# Patient Record
Sex: MALE | Race: BLACK OR AFRICAN AMERICAN | URBAN - METROPOLITAN AREA
[De-identification: names, ages, dates, MRNs, and addresses within clinical notes are randomized per-mention and may not be internally consistent; named-entity substitution may affect disease eponyms.]

---

## 2017-01-13 ENCOUNTER — TELEPHONE (OUTPATIENT)
Dept: FAMILY MEDICINE CLINIC | Age: 62
End: 2017-01-13

## 2017-01-13 NOTE — TELEPHONE ENCOUNTER
Received call from [facility] w/ Dr. Blair Waddell office patient has appointment on 1/16/17 and needs new referral DX code M05.79    [facility] 1949  Dr. Thomas Mendez  [address] [place] [address]  Phone # [phone]  Fax # [phone]

## 2017-01-20 ENCOUNTER — OFFICE VISIT (OUTPATIENT)
Dept: FAMILY MEDICINE CLINIC | Age: 62
End: 2017-01-20

## 2017-01-20 VITALS
DIASTOLIC BLOOD PRESSURE: 84 MMHG | WEIGHT: 198.6 LBS | BODY MASS INDEX: 29.41 KG/M2 | HEIGHT: 69 IN | TEMPERATURE: 97 F | OXYGEN SATURATION: 95 % | RESPIRATION RATE: 18 BRPM | SYSTOLIC BLOOD PRESSURE: 128 MMHG | HEART RATE: 69 BPM

## 2017-01-20 DIAGNOSIS — E78.5 HYPERLIPIDEMIA WITH TARGET LDL LESS THAN 100: Chronic | ICD-10-CM

## 2017-01-20 DIAGNOSIS — M06.9 RHEUMATOID ARTHRITIS, INVOLVING UNSPECIFIED SITE, UNSPECIFIED RHEUMATOID FACTOR PRESENCE: Primary | Chronic | ICD-10-CM

## 2017-01-20 DIAGNOSIS — I10 ESSENTIAL HYPERTENSION: Chronic | ICD-10-CM

## 2017-01-20 DIAGNOSIS — Z79.899 ENCOUNTER FOR LONG-TERM (CURRENT) USE OF OTHER MEDICATIONS: ICD-10-CM

## 2017-01-20 DIAGNOSIS — H61.23 BILATERAL IMPACTED CERUMEN: ICD-10-CM

## 2017-01-20 DIAGNOSIS — E11.9 DIABETES MELLITUS TYPE 2, NONINSULIN DEPENDENT (HCC): Chronic | ICD-10-CM

## 2017-01-20 RX ORDER — LOSARTAN POTASSIUM AND HYDROCHLOROTHIAZIDE 12.5; 5 MG/1; MG/1
TABLET ORAL
Qty: 90 TAB | Refills: 1 | Status: SHIPPED | OUTPATIENT
Start: 2017-01-20 | End: 2017-07-25 | Stop reason: SDUPTHER

## 2017-01-20 NOTE — PROGRESS NOTES
3 [facility]  Primary Care Office Visit - Problem-Oriented    : 1955   Sebastian Nathan is a 59 y.o. male presenting for  Chief Complaint   Patient presents with    Cholesterol Problem    Hypertension    Diabetes       Assessment/Plan:     1. Rheumatoid arthritis, involving unspecified site, unspecified rheumatoid factor presence ([facility]  Uncontrolled on only Arava. Had appt with rheum 2 days ago. Encourage patient to continue with rheum consultation. 2. Diabetes mellitus type 2, noninsulin dependent ([address]  Well controlled, most recent A1c 6.7 in Aug 2016. I have reviewed & decided to continue metformin 1g BID. On ASA, ARB. Lipid lowering therapy not prescibed for medical reasons. 3. Essential hypertension  Well controlled. I have reviewed & decided to continue losartan/HCTZ 50/12.5.  - losartan-hydroCHLOROthiazide (HYZAAR) 50-12.5 mg per tablet; TAKE 1 TABLET EVERY DAY  Dispense: 90 Tab; Refill: 1    4. Bilateral impacted cerumen  New issue. Successfully disimpacted bilaterally with manual curette. Encourage olive oil gtt for softening prior to shower ~2-3x/wk. - REMOVE IMPACTED EAR WAX    Spent 25min face-to-face, >50% spent on counseling & patient education. This document may have been created with the aid of dictation software. Text may contain errors, particularly phonetic errors. Reviewed management plan & instructions with patient, who voiced understanding. Chase Arroyo MD  Internal Medicine, Family Medicine & Sports Medicine  2017, 10:10 AM    Patient Instructions (provided in AVS): To Do:  · Try using olive oil drops (1-2 drops) into your ears before showering (softens up the wax)  · Okay to take that elavil (amitryptiline) before bed to help you get a good night sleep  · If Dr. Feli Corcoran wants you to have bloodwork before your next appt with him. ... Get the lab slip, and the appointment date, and bring it to me!   That way, you can get all your bloodwork done here in my office, and I will send Ana Betancourt your results    Earwax Blockage: Care Instructions      History:   Nate Ortega is a 59 y.o. male presenting to address:  Chief Complaint   Patient presents with    Cholesterol Problem    Hypertension    Diabetes       Going to the gym 3-4x/week. No recent illnesses. Complains of some ear wax buildup. Had appt with rheumatology (first one in a while), 2 days ago. Dr. Ana Betancourt mentioned possibly adding plaquenil. Patient prefers to have his bloodwork done @ our office. Notes some difficulty sleeping. \"I sleep about 6, but I'd like to sleep more\"  Admits to not taking elavil regularly. \"Why am I on that again? \"       Past Medical History   Diagnosis Date    Diabetes ([facility]     H. pylori infection      s/p prevpack    H/O esophagogastroduodenoscopy 3/1/2013     irregular z-line, small 1cm submucosal nodule    Hypercholesterolemia     Hypertension     Rheumatoid arthritis(714.0)      Past Surgical History   Procedure Laterality Date    Hx hernia repair      Hx wisdom teeth extraction      Endoscopy, colon, diagnostic        reports that he has quit smoking. He has never used smokeless tobacco. He reports that he does not drink alcohol or use illicit drugs.   Social History     Social History Narrative     History   Smoking Status    Former Smoker   Smokeless Tobacco    Never Used     Family History   Problem Relation Age of Onset    Heart Disease Mother     Heart Disease Father    Lorin Yan Arthritis-rheumatoid Sister     Hypertension Sister     Heart Disease Sister     Heart Disease Brother      Allergies   Allergen Reactions    Statins-Hmg-Coa Reductase Inhibitors Myalgia       Problem List:      Patient Active Problem List    Diagnosis    Essential hypertension     - meds: losartan/HCTZ 50/12.5        Diabetes mellitus type 2, noninsulin dependent ([facility]     - meds: metformin 1000mg BID    - A1c 7.5 (Nov 2015)  - A1c 6.5 (4/14/2015)    - foot exam: 7/8/2014    Intolerant of statins.  Rheumatoid arthritis [facility])     New consult Dr. [facility].    -4/26/2016: [Rheum] refilled leflunomide 20 mg daily, noted that he would be switching to a rheumatologist closer to home  -2/17/2016: continue on leflunomide, plaquenil, labs: cbc, ESR, CMP, CRP, B hand xrays;  follow up 2 mo    Previously followed by Dr. Manfred Gil (rheum)    -- 9/29/2014 [rheum]: cont humira, plaquenil & tramadol; stop prednisone; flu shot given  -- 7/29/2014 [rheum]: cont humira & plaquenil; reduce prednisone to 2.5mg daily  -- 4/29/2014 [rheum]: combo of RA & DJD; continue humira, plaquenil & prednisone 5mg daily; added tramadol qhs prn; avoid NSAIDs; recs: check neck US to rule out lymph node swelling (R side, above clavicle)  -- Mar 2013: RF = 588      Hyperlipidemia with target LDL less than 100     - statin intolerant    -- tot 258 (H), HDL 45, , LDLc 186 (H) (1/10/2015); 10yr ASCVD risk 22.5%  -- tot 146, , HDL 51, LDLc 72 (7/8/2014)  -- Sept 2013: tot 139, LDL 76, HDL 42,       Chronic gastritis    Esophagitis     Chronic esophgitis by bx with irregular z-line (Mar 2013) by Dr. Rosanne Denton (DL[facility]).  DDD (degenerative disc disease), lumbosacral     L-spine XR (Dec 2016): Mild L4-S1 degenerative endplate reactive changes and disc space height loss. Moderate bilateral facet arthropathy L4-S1. Bilateral oblique views demonstrate moderate right and mild left L5-S1 foraminal stenoses.  Statin intolerance    Advance care planning    Obesity    Right thyroid nodule     Followed by Dr. Sachi Weber (endo).     -- 6/10/2014 [endo]: FNA Bx negative for malignancy      Vitamin D deficiency     - VitD 34.4 (7/8/2014)    *5/7/2014: VitD 14.6 (L)  - start 50k 2x/wk      Elevated TSH    Decreased libido     -- 3/4/2014: slightly low free testosterone, normal PSA, but slightly elevated TSH      TERRY (obstructive sleep apnea)     [facility] S[facility])  -- 12/17/2013: overnight sleep study pending        Myalgia     *10/9/2014: improved off of januvia  *5/7/2014: sx unchanged off crestor    -- 3/4/2014: continue to hold crestor; advised massage & stretching  --  (2/21/2014); holding crestor until next appt  -- aldolase wnl (2/21/2014)      Routine health maintenance     -- c-scope: 4/6/2012 by Lauro Harvey ([address]), hyperplastic polyps; next due in 10 yrs (Apr 2022)      Neural foraminal stenosis of cervical spine     C-spine MRI (Apr 2013):  - focal R paracentral disc protrusion @ C3-4 with mild cord impingement  - mild to mod foraminal stenoses, primarily @ C3-4 and C4-5 on right  - no C1-2 rheumatoid instability      Hemoglobin C trait ([address]     Seen on Hgb electrophoresis on 5/29/2013. Medications:     Current Outpatient Prescriptions   Medication Sig    losartan-hydroCHLOROthiazide (HYZAAR) 50-12.5 mg per tablet TAKE 1 TABLET EVERY DAY    ACCU-[employer] TEST STRIP strip CHECK BLOOD SUGAR TWICE DAILY    traMADol (ULTRAM) 50 mg tablet TAKE 1 TABLET EVERY 8 HOURS AS NEEDED FOR PAIN  ( MAXIMUM DAILY AMOUNT:  150MG  )    leflunomide (ARAVA) 20 mg tablet Take 1 Tab by mouth daily.  metFORMIN (GLUCOPHAGE) 1,000 mg tablet TAKE 1 TABLET TWICE DAILY WITH MEALS    ACCU-[employer] SMARTVIEW CONTRL SOL soln USE AS DIRECTED    omeprazole (PRILOSEC) 40 mg capsule TAKE 1 CAPSULE EVERY DAY    amitriptyline (ELAVIL) 25 mg tablet Take 1 Tab by mouth nightly. Take amitriptyline about 1 hour before bedtime (Patient taking differently: Take 25 mg by mouth nightly as needed. Take amitriptyline about 1 hour before bedtime)    acetaminophen (TYLENOL) 500 mg tablet 500 mg.  butalbital-acetaminophen-caffeine (FIORICET, ESGIC) -40 mg per tablet Take 1 Tab by mouth every four (4) hours as needed for Pain. Max Daily Amount: 6 Tabs.     red yeast rice extract 600 mg cap Take 2,400 mg by mouth daily.  fluticasone (FLONASE) 50 mcg/actuation nasal spray 2 Sprays by Both Nostrils route daily.  aspirin 81 mg tablet Take 1 Tab by mouth daily.  Walker (ULTRA-LIGHT ROLLATOR) Misc Rollator Walker. Use as directed. No current facility-administered medications for this visit. Review of Systems:     (positives in bold)   Const: fatigue, weight change, appetite change, fevers, chills   Neuro: headaches, vision changes, dizziness, loss of consciousness, burning pain, tingling, numbness   HEENT: itchy eyes, runny eyes, red eyes, eye discharge, vision changes, light sensitivity, ear pain, ear pressure, ear popping, ear discharge/drainage, hearing change,nosebleeds, sneezing, runny nose, nasal congestion,  change in sense of smell, sore throat, voice change or hoarse voice,   dry mouth, toothache, jaw popping, difficulty swallowing, painful swallowing,   oral ulcers or canker sores   CV: chest pain, palpitations, orthopnea, PND   Pulm: dyspnea, wheezing, cough, sputum production, hemoptysis   GI: nausea, vomiting, heartburn, abdominal pain, greasy stools,   blood in stool, diarrhea, constipation   : dysuria, hematuria, change in urine, urinary frequency, urinary urgency   MSK: muscle/joint pain, joint swelling   Derm: rashes, skin changes   Allergy: seasonal allergies, itchy eyes   Heme: easy bleeding/bruising   Psych: Suicidal ideation, homicidal ideation       Physical Assessment:   VS:    Vitals:    01/20/17 1000 01/20/17 1020   BP: 129/90 128/84   Pulse: 69    Resp: 18    Temp: 97 °F (36.1 °C)    TempSrc: Oral    SpO2: 95%    Weight: 198 lb 9.6 oz (90.1 kg)    Height: 5' 9\" (1.753 m)    PainSc:   5    PainLoc: Back        General:     Well-developed, well-nourished male, in NAD    Head:      No facial asymmetry noted. Ears:    Bilateral EACs almost completely blocked with soft cerumen.   Successfully disimpacted manually with ear currette on both sides. Bilateral TMs wnl. Neck:      Neck supple, no thyromegaly  Cardiovasc:     No JVD. RRR, no MRG. Pulses 2+ and symmetric at distal extremities. Pulmonary:     Lungs clear bilaterally. Normal respiratory effort. Extremities:     No edema, no TTP bilateral calves. No joint effusions. LEs warm and well-perfused. Neuro:     Alert and oriented, CNs II-XII intact, no focal deficits. Skin:      No rashes noted. Toenails without signs of onychomycosis. Psych:    pleasant, and conversant. Affect, mood & judgment appropriate. Lab Results   Component Value Date/Time    Sodium 139 12/09/2016 11:43 AM    Potassium 4.0 12/09/2016 11:43 AM    Chloride 95 12/09/2016 11:43 AM    CO2 28 12/09/2016 11:43 AM    Anion gap 8 11/23/2015 11:32 AM    Glucose 100 12/09/2016 11:43 AM    BUN 9 12/09/2016 11:43 AM    Creatinine 0.78 12/09/2016 11:43 AM    BUN/Creatinine ratio 12 12/09/2016 11:43 AM    GFR est  12/09/2016 11:43 AM    GFR est non-AA 96 12/09/2016 11:43 AM    Calcium 9.7 12/09/2016 11:43 AM    Bilirubin, total 0.6 12/09/2016 11:43 AM    ALT 19 12/09/2016 11:43 AM    AST 16 12/09/2016 11:43 AM    Alk.  phosphatase 98 12/09/2016 11:43 AM    Protein, total 7.3 12/09/2016 11:43 AM    Albumin 4.4 12/09/2016 11:43 AM    Globulin 3.7 11/23/2015 11:32 AM    A-G Ratio 1.5 12/09/2016 11:43 AM     Lab Results   Component Value Date/Time    Cholesterol, total 257 12/09/2016 11:43 AM    HDL Cholesterol 45 12/09/2016 11:43 AM    LDL, calculated 188 12/09/2016 11:43 AM    VLDL, calculated 24 12/09/2016 11:43 AM    Triglyceride 122 12/09/2016 11:43 AM    CHOL/HDL Ratio 7.0 11/23/2015 11:32 AM     Lab Results   Component Value Date/Time    TSH 2.610 12/09/2016 11:43 AM    TSH 2.54 01/09/2015 10:36 AM        12/13/2016 11:53 AM - Ricardo, [facility] Lab Results In       Component Results      Component Value Flag Ref Range Units Status     C-Reactive Protein, Qt 20.1 (H) 0.0 - 4.9 mg/L Final     Lab Results Component Value Date/Time    Prostate Specific Ag 0.9 12/09/2016 11:43 AM    Prostate Specific Ag 0.8 03/05/2014 09:20 AM       Lab Results   Component Value Date/Time    Hemoglobin A1c 6.7 08/26/2016 10:05 AM    Hemoglobin A1c (POC) 6.7 05/23/2016 10:31 AM

## 2017-01-20 NOTE — MR AVS SNAPSHOT
Visit Information Date & Time Provider Department Dept. Phone Encounter #  
 1/20/2017 10:00 AM Daryle Harm, 3 [facility] [phone] [id number] Upcoming Health Maintenance Date Due ZOSTER VACCINE AGE 60> 5/5/2013 EYE EXAM RETINAL OR DILATED Q1 4/17/2016 HEMOGLOBIN A1C Q6M 2/26/2017 FOOT EXAM Q1 9/8/2017 MICROALBUMIN Q1 12/9/2017 LIPID PANEL Q1 12/9/2017 COLONOSCOPY 4/6/2022 DTaP/Tdap/Td series (3 - Td) 4/20/2025 Allergies as of 1/20/2017  Review Complete On: 1/20/2017 By: Daryle Harm, MD  
  
 Severity Noted Reaction Type Reactions Statins-hmg-coa Reductase Inhibitors Medium 04/20/2015    Myalgia Current Immunizations  Reviewed on 4/20/2015 Name Date Influenza Vaccine 9/8/2016 10:01 AM, 11/23/2015 10:01 AM  
 Pneumococcal Polysaccharide (PPSV-23) 8/11/2009 Tdap 4/20/2015 10:12 AM, 2/19/2009 Not reviewed this visit You Were Diagnosed With   
  
 Codes Comments Rheumatoid arthritis, involving unspecified site, unspecified rheumatoid factor presence ([facility]    -  Primary ICD-10-CM: M06.9 ICD-9-CM: 714.0 Diabetes mellitus type 2, noninsulin dependent ([facility]     ICD-10-CM: E11.9 ICD-9-CM: 250.00 Essential hypertension     ICD-10-CM: I10 
ICD-9-CM: 401.9 Bilateral impacted cerumen     ICD-10-CM: H61.23 
ICD-9-CM: 380.4 Vitals BP Pulse Temp Resp Height(growth percentile) Weight(growth percentile) 128/84 (BP 1 Location: Right arm, BP Patient Position: Sitting) 69 97 °F (36.1 °C) (Oral) 18 5' 9\" (1.753 m) 198 lb 9.6 oz (90.1 kg) SpO2 BMI Smoking Status 95% 29.33 kg/m2 Former Smoker Vitals History BMI and BSA Data Body Mass Index Body Surface Area  
 29.33 kg/m 2 2.09 m 2 Preferred Pharmacy Pharmacy Name Phone [address] [facility] [address] [place] [address] [phone] Your Updated Medication List  
  
   
 This list is accurate as of: 1/20/17 10:40 AM.  Always use your most recent med list.  
  
  
  
  
 ACCU-CHEK SMARTVIEW CONTRL SOL Soln Generic drug:  Blood Glucose Control, Normal  
USE AS DIRECTED ACCU-CHEK SMARTVIEW TEST STRIP strip Generic drug:  glucose blood VI test strips CHECK BLOOD SUGAR TWICE DAILY  
  
 acetaminophen 500 mg tablet Commonly known as:  TYLENOL  
500 mg.  
  
 amitriptyline 25 mg tablet Commonly known as:  ELAVIL Take 1 Tab by mouth nightly. Take amitriptyline about 1 hour before bedtime  
  
 aspirin 81 mg tablet Take 1 Tab by mouth daily. butalbital-acetaminophen-caffeine -40 mg per tablet Commonly known as:  Clara Reasoner Take 1 Tab by mouth every four (4) hours as needed for Pain. Max Daily Amount: 6 Tabs. fluticasone 50 mcg/actuation nasal spray Commonly known as:  Mckinley Means 2 Sprays by Both Nostrils route daily. leflunomide 20 mg tablet Commonly known as:  Joao Toribio Take 1 Tab by mouth daily. losartan-hydroCHLOROthiazide 50-12.5 mg per tablet Commonly known as:  HYZAAR  
TAKE 1 TABLET EVERY DAY  
  
 metFORMIN 1,000 mg tablet Commonly known as:  GLUCOPHAGE  
TAKE 1 TABLET TWICE DAILY WITH MEALS  
  
 omeprazole 40 mg capsule Commonly known as:  PRILOSEC  
TAKE 1 CAPSULE EVERY DAY  
  
 red yeast rice extract 600 mg Cap Take 2,400 mg by mouth daily. traMADol 50 mg tablet Commonly known as:  ULTRAM  
TAKE 1 TABLET EVERY 8 HOURS AS NEEDED FOR PAIN  ( MAXIMUM DAILY AMOUNT:  150MG  ) Kavita Frederick Commonly known as:  ULTRA-LIGHT ROLLATOR Rollator Walker. Use as directed. Prescriptions Sent to Pharmacy Refills  
 losartan-hydroCHLOROthiazide (HYZAAR) 50-12.5 mg per tablet 1 Sig: TAKE 1 TABLET EVERY DAY Class: Normal  
 Pharmacy: [address] [place] [address], [address] [place] [address] Ph #: [phone] We Performed the Following REMOVE IMPACTED EAR WAX [33295 CPT(R)] Patient Instructions To Do: · Try using olive oil drops (1-2 drops) into your ears before showering (softens up the wax) · Okay to take that elavil (amitryptiline) before bed to help you get a good night sleep · If Dr. Cindy Huitron wants you to have bloodwork before your next appt with him. ... Get the lab slip, and the appointment date, and bring it to me! That way, you can get all your bloodwork done here in my office, and I will send Cindy Huitron your results Earwax Blockage: Care Instructions Your Care Instructions Earwax is a natural substance that protects the ear canal. Normally, earwax drains from the ears and does not cause problems. Sometimes earwax builds up and hardens. Earwax blockage (also called cerumen impaction) can cause some loss of hearing and pain. When wax is tightly packed, you will need to have your doctor remove it. Follow-up care is a key part of your treatment and safety. Be sure to make and go to all appointments, and call your doctor if you are having problems. Its also a good idea to know your test results and keep a list of the medicines you take. How can you care for yourself at home? · Do not try to remove earwax with cotton swabs, fingers, or other objects. This can make the blockage worse and damage the eardrum. · If your doctor recommends that you try to remove earwax at home: ¨ Soften and loosen the earwax with warm mineral oil. You also can try hydrogen peroxide mixed with an equal amount of room temperature water. Place 2 drops of the fluid, warmed to body temperature, in the ear two times a day for up to 5 days. ¨ Once the wax is loose and soft, all that is usually needed to remove it from the ear canal is a gentle, warm shower. Direct the water into the ear, then tip your head to let the earwax drain out. Dry your ear thoroughly with a hair dryer set on low. Hold the dryer several inches from your ear. ¨ If the warm mineral oil and shower do not work, use an over-the-counter wax softener followed by gentle flushing with an ear syringe each night for a week or two. Make sure the flushing solution is body temperature. Cool or hot fluids in the ear can cause dizziness. When should you call for help? Call your doctor now or seek immediate medical care if: · Pus or blood drains from your ear. · Your ears are ringing or feel full. · You have a loss of hearing. Watch closely for changes in your health, and be sure to contact your doctor if: 
· You have pain or reduced hearing after 1 week of home treatment. · You have any new symptoms, such as nausea or balance problems. Where can you learn more? Go to http://nikita-hieu.info/. Enter X678 in the search box to learn more about \"Earwax Blockage: Care Instructions. \" Current as of: May 27, 2016 Content Version: 11.1 © 2861-2623 [employer]. Care instructions adapted under license by [employer] (which disclaims liability or warranty for this information). If you have questions about a medical condition or this instruction, always ask your healthcare professional. [place] [address] any warranty or liability for your use of this information. Introducing [facility]! Raegan Chaudhry introduces [employer] patient portal. Now you can access parts of your medical record, email your doctor's office, and request medication refills online. 1. In your internet browser, go to [email or web address] 2. Click on the First Time User? Click Here link in the Sign In box. You will see the New Member Sign Up page. 3. Enter your [employer]ify Access Code exactly as it appears below. You will not need to use this code after youve completed the sign-up process. If you do not sign up before the expiration date, you must request a new code. · [employer] Access Code: [email or web address] Expires: 3/9/2017 10:34 AM 
 
4. Enter the last four digits of your Social Security Number (xxxx) and Date of Birth (mm/dd/yyyy) as indicated and click Submit. You will be taken to the next sign-up page. 5. Create a [employer] ID. This will be your [employer] login ID and cannot be changed, so think of one that is secure and easy to remember. 6. Create a [employer] password. You can change your password at any time. 7. Enter your Password Reset Question and Answer. This can be used at a later time if you forget your password. 8. Enter your e-mail address. You will receive e-mail notification when new information is available in [address] 9. Click Sign Up. You can now view and download portions of your medical record. 10. Click the Download Summary menu link to download a portable copy of your medical information. If you have questions, please visit the Frequently Asked Questions section of the [employer] website. Remember, [employer] is NOT to be used for urgent needs. For medical emergencies, dial 911. Now available from your iPhone and Android! Please provide this summary of care documentation to your next provider. Your primary care clinician is listed as Kamari Shaw. If you have any questions after today's visit, please call [phone].

## 2017-01-20 NOTE — PROGRESS NOTES
Nidia Castro. is a 59 y.o. male here for a follow up. 1. Have you been to the ER, urgent care clinic or hospitalized since your last visit? NO.     2. Have you seen or consulted any other health care providers outside of the [address] [place] [address] since your last visit (Include any pap smears or colon screening)? Taryn Fitzpatrick 1/16,2017  Do you have an Advanced Directive? NO    Would you like information on Advanced Directives?  NO    Learning Assessment 5/7/2014   PRIMARY LEARNER Patient   HIGHEST LEVEL OF EDUCATION - PRIMARY LEARNER  SOME COLLEGE   BARRIERS PRIMARY LEARNER NONE   CO-LEARNER CAREGIVER No   PRIMARY LANGUAGE ENGLISH   LEARNER PREFERENCE PRIMARY OTHER (COMMENT)   ANSWERED BY Patient   RELATIONSHIP SELF

## 2017-01-20 NOTE — PATIENT INSTRUCTIONS
To Do:  · Try using olive oil drops (1-2 drops) into your ears before showering (softens up the wax)  · Okay to take that elavil (amitryptiline) before bed to help you get a good night sleep  · If Dr. Cheri Smith wants you to have bloodwork before your next appt with him. ... Get the lab slip, and the appointment date, and bring it to me! That way, you can get all your bloodwork done here in my office, and I will send Cheri Smith your results         Earwax Blockage: Care Instructions  Your Care Instructions    Earwax is a natural substance that protects the ear canal. Normally, earwax drains from the ears and does not cause problems. Sometimes earwax builds up and hardens. Earwax blockage (also called cerumen impaction) can cause some loss of hearing and pain. When wax is tightly packed, you will need to have your doctor remove it. Follow-up care is a key part of your treatment and safety. Be sure to make and go to all appointments, and call your doctor if you are having problems. Its also a good idea to know your test results and keep a list of the medicines you take. How can you care for yourself at home? · Do not try to remove earwax with cotton swabs, fingers, or other objects. This can make the blockage worse and damage the eardrum. · If your doctor recommends that you try to remove earwax at home:  ¨ Soften and loosen the earwax with warm mineral oil. You also can try hydrogen peroxide mixed with an equal amount of room temperature water. Place 2 drops of the fluid, warmed to body temperature, in the ear two times a day for up to 5 days. ¨ Once the wax is loose and soft, all that is usually needed to remove it from the ear canal is a gentle, warm shower. Direct the water into the ear, then tip your head to let the earwax drain out. Dry your ear thoroughly with a hair dryer set on low. Hold the dryer several inches from your ear.   ¨ If the warm mineral oil and shower do not work, use an over-the-counter wax softener followed by gentle flushing with an ear syringe each night for a week or two. Make sure the flushing solution is body temperature. Cool or hot fluids in the ear can cause dizziness. When should you call for help? Call your doctor now or seek immediate medical care if:  · Pus or blood drains from your ear. · Your ears are ringing or feel full. · You have a loss of hearing. Watch closely for changes in your health, and be sure to contact your doctor if:  · You have pain or reduced hearing after 1 week of home treatment. · You have any new symptoms, such as nausea or balance problems. Where can you learn more? Go to http://nikita-hieu.info/. Enter M754 in the search box to learn more about \"Earwax Blockage: Care Instructions. \"  Current as of: May 27, 2016  Content Version: 11.1  © 1287-9279 [employer]. Care instructions adapted under license by [employer] (which disclaims liability or warranty for this information). If you have questions about a medical condition or this instruction, always ask your healthcare professional. [address] any warranty or liability for your use of this information.

## 2017-04-03 RX ORDER — METFORMIN HYDROCHLORIDE 1000 MG/1
TABLET ORAL
Qty: 180 TAB | Refills: 1 | Status: SHIPPED | OUTPATIENT
Start: 2017-04-03 | End: 2017-11-03 | Stop reason: SDUPTHER

## 2017-04-03 NOTE — TELEPHONE ENCOUNTER
Pt called to check status of refill. Pt informed Dr. Malu Moreno is not in the office today. I let him know I would ask covering provider.

## 2017-04-17 ENCOUNTER — HOSPITAL ENCOUNTER (OUTPATIENT)
Dept: LAB | Age: 62
Discharge: HOME | End: 2017-04-17

## 2017-04-17 DIAGNOSIS — E78.5 HYPERLIPIDEMIA WITH TARGET LDL LESS THAN 100: Chronic | ICD-10-CM

## 2017-04-17 DIAGNOSIS — E11.9 DIABETES MELLITUS TYPE 2, NONINSULIN DEPENDENT (HCC): Chronic | ICD-10-CM

## 2017-04-17 PROCEDURE — 99001 SPECIMEN HANDLING PT-LAB: CPT | Performed by: FAMILY MEDICINE

## 2017-04-18 LAB
ALBUMIN SERPL-MCNC: 4.4 G/DL (ref 3.6–4.8)
ALBUMIN/GLOB SERPL: 1.4 {RATIO} (ref 1.2–2.2)
ALP SERPL-CCNC: 103 IU/L (ref 39–117)
ALT SERPL-CCNC: 23 IU/L (ref 0–44)
AST SERPL-CCNC: 19 IU/L (ref 0–40)
BASOPHILS # BLD AUTO: 0 X10E3/UL (ref 0–0.2)
BASOPHILS NFR BLD AUTO: 1 %
BILIRUB SERPL-MCNC: 0.4 MG/DL (ref 0–1.2)
BUN SERPL-MCNC: 11 MG/DL (ref 8–27)
BUN/CREAT SERPL: 13 (ref 10–24)
CALCIUM SERPL-MCNC: 9.9 MG/DL (ref 8.6–10.2)
CHLORIDE SERPL-SCNC: 96 MMOL/L (ref 96–106)
CHOLEST SERPL-MCNC: 273 MG/DL (ref 100–199)
CO2 SERPL-SCNC: 24 MMOL/L (ref 18–29)
COMMENT, 011824: ABNORMAL
CREAT SERPL-MCNC: 0.88 MG/DL (ref 0.76–1.27)
EOSINOPHIL # BLD AUTO: 0.3 X10E3/UL (ref 0–0.4)
EOSINOPHIL NFR BLD AUTO: 6 %
ERYTHROCYTE [DISTWIDTH] IN BLOOD BY AUTOMATED COUNT: 16.6 % (ref 12.3–15.4)
EST. AVERAGE GLUCOSE BLD GHB EST-MCNC: 140 MG/DL
GLOBULIN SER CALC-MCNC: 3.2 G/DL (ref 1.5–4.5)
GLUCOSE SERPL-MCNC: 119 MG/DL (ref 65–99)
HBA1C MFR BLD: 6.5 % (ref 4.8–5.6)
HCT VFR BLD AUTO: 40.9 % (ref 37.5–51)
HDLC SERPL-MCNC: 45 MG/DL
HGB BLD-MCNC: 13.8 G/DL (ref 12.6–17.7)
IMM GRANULOCYTES # BLD: 0 X10E3/UL (ref 0–0.1)
IMM GRANULOCYTES NFR BLD: 0 %
INTERPRETATION, 910389: NORMAL
LDLC SERPL CALC-MCNC: 201 MG/DL (ref 0–99)
LYMPHOCYTES # BLD AUTO: 1.9 X10E3/UL (ref 0.7–3.1)
LYMPHOCYTES NFR BLD AUTO: 37 %
MCH RBC QN AUTO: 25.2 PG (ref 26.6–33)
MCHC RBC AUTO-ENTMCNC: 33.7 G/DL (ref 31.5–35.7)
MCV RBC AUTO: 75 FL (ref 79–97)
MONOCYTES # BLD AUTO: 0.4 X10E3/UL (ref 0.1–0.9)
MONOCYTES NFR BLD AUTO: 8 %
NEUTROPHILS # BLD AUTO: 2.5 X10E3/UL (ref 1.4–7)
NEUTROPHILS NFR BLD AUTO: 48 %
PLATELET # BLD AUTO: 254 X10E3/UL (ref 150–379)
POTASSIUM SERPL-SCNC: 4.2 MMOL/L (ref 3.5–5.2)
PROT SERPL-MCNC: 7.6 G/DL (ref 6–8.5)
RBC # BLD AUTO: 5.47 X10E6/UL (ref 4.14–5.8)
SODIUM SERPL-SCNC: 139 MMOL/L (ref 134–144)
TRIGL SERPL-MCNC: 133 MG/DL (ref 0–149)
VLDLC SERPL CALC-MCNC: 27 MG/DL (ref 5–40)
WBC # BLD AUTO: 5.2 X10E3/UL (ref 3.4–10.8)

## 2017-05-05 ENCOUNTER — OFFICE VISIT (OUTPATIENT)
Dept: FAMILY MEDICINE CLINIC | Age: 62
End: 2017-05-05

## 2017-05-05 VITALS
HEART RATE: 78 BPM | DIASTOLIC BLOOD PRESSURE: 86 MMHG | OXYGEN SATURATION: 94 % | BODY MASS INDEX: 29.21 KG/M2 | HEIGHT: 69 IN | TEMPERATURE: 97.5 F | SYSTOLIC BLOOD PRESSURE: 137 MMHG | WEIGHT: 197.2 LBS | RESPIRATION RATE: 18 BRPM

## 2017-05-05 DIAGNOSIS — M06.9 RHEUMATOID ARTHRITIS, INVOLVING UNSPECIFIED SITE, UNSPECIFIED RHEUMATOID FACTOR PRESENCE: Chronic | ICD-10-CM

## 2017-05-05 DIAGNOSIS — E78.5 HYPERLIPIDEMIA WITH TARGET LDL LESS THAN 100: Chronic | ICD-10-CM

## 2017-05-05 DIAGNOSIS — E11.9 DIABETES MELLITUS TYPE 2, NONINSULIN DEPENDENT (HCC): Primary | Chronic | ICD-10-CM

## 2017-05-05 DIAGNOSIS — Z78.9 STATIN INTOLERANCE: ICD-10-CM

## 2017-05-05 DIAGNOSIS — L91.8 SKIN TAG: ICD-10-CM

## 2017-05-05 DIAGNOSIS — I10 ESSENTIAL HYPERTENSION: Chronic | ICD-10-CM

## 2017-05-05 NOTE — PATIENT INSTRUCTIONS
To Do:  · Increase your red yeast rice to 4 per day      Notes from your doctor:  · [employer] by Azam Morales near [facility] for weight lifting hand wraps

## 2017-05-05 NOTE — PROGRESS NOTES
3 [facility]  Primary Care Office Visit - Problem-Oriented    : 1955   Lorenzo Henderson is a 57 y.o. male presenting for  Chief Complaint   Patient presents with    Physical     patient is here for checkup. Assessment/Plan:     1. Diabetes mellitus type 2, noninsulin dependent ([address]  Well controlled. A1c 6.5  I have reviewed & decided to continue metformin 1g BID. On ASA, ARB. Lipid lowering therapy not prescibed for medical reasons. 2. Rheumatoid arthritis, involving unspecified site, unspecified rheumatoid factor presence ([facility]  Followed by rheumatology. 3. Essential hypertension  Well controlled. I have reviewed & decided to continue losartan/HCTZ 50/12.5.    4. Hyperlipidemia with target LDL less than 100  5. Statin intolerance  Uncontrolled. Continue red yeast rice, discussed possibly increasing dose. Spent 25min face-to-face, >50% spent on counseling & patient education. This document may have been created with the aid of dictation software. Text may contain errors, particularly phonetic errors. Reviewed management plan & instructions with patient, who voiced understanding. Pretty Gunter MD  Internal Medicine, Family Medicine & Sports Medicine  2017, 10:03 AM    Patient Instructions (provided in AVS): To Do:  · Increase your red yeast rice to 4 per day      Notes from your doctor:  · [employer] by Mark Schultz near [facility] for weight lifting hand wraps    History:   Lorenzo Henderson is a 57 y.o. male presenting to address:  Chief Complaint   Patient presents with    Physical     patient is here for checkup. Next appt for Dr. Gerardo Kaufman on the . Is going to the gym 2-3x/week. Otherwise is doing okay. Wants to review lab results. Denies any AE from meds, and reports good compliance.     Past Medical History:   Diagnosis Date    Diabetes ([facility]     H. pylori infection     s/p prevpack    H/O esophagogastroduodenoscopy 3/1/2013    irregular z-line, small 1cm submucosal nodule    Hypercholesterolemia     Hypertension     Rheumatoid arthritis ([facility]      Past Surgical History:   Procedure Laterality Date    ENDOSCOPY, COLON, DIAGNOSTIC      HX HERNIA REPAIR      HX WISDOM TEETH EXTRACTION        reports that he has quit smoking. He has never used smokeless tobacco. He reports that he does not drink alcohol or use illicit drugs. Social History     Social History Narrative     History   Smoking Status    Former Smoker   Smokeless Tobacco    Never Used     Family History   Problem Relation Age of Onset    Heart Disease Mother     Heart Disease Father    [address] [facility] Arthritis-rheumatoid Sister     Hypertension Sister     Heart Disease Sister     Heart Disease Brother      Allergies   Allergen Reactions    Statins-Hmg-Coa Reductase Inhibitors Myalgia       Problem List:      Patient Active Problem List    Diagnosis    Essential hypertension     - meds: losartan/HCTZ 50/12.5        Diabetes mellitus type 2, noninsulin dependent ([facility]     - meds: metformin 1000mg BID    - A1c 7.5 (Nov 2015)  - A1c 6.5 (4/14/2015)    - foot exam: 7/8/2014    Intolerant of statins.           Rheumatoid arthritis ([facility]     -1/16/2017 [Dr. Jared Dunne: seropositive long-standing disease on arava monotherapy, reporting RAPID3 16.3; sicca syndrome; r/o Sjogrens, labs, possibly US to eval disease activity    -4/26/2016: [Rheum] refilled leflunomide 20 mg daily, noted that he would be switching to a rheumatologist closer to home  -2/17/2016: continue on leflunomide, plaquenil, labs: cbc, ESR, CMP, CRP, B hand xrays;  follow up 2 mo    Previously followed by Dr. Blanca Mooney (rheum)    -- 9/29/2014 [rheum]: cont humira, plaquenil & tramadol; stop prednisone; flu shot given  -- 7/29/2014 [rheum]: cont humira & plaquenil; reduce prednisone to 2.5mg daily  -- 4/29/2014 [rheum]: combo of RA & DJD; continue humira, plaquenil & prednisone 5mg daily; added tramadol qhs prn; avoid NSAIDs; recs: check neck US to rule out lymph node swelling (R side, above clavicle)  -- Mar 2013: RF = 588      Hyperlipidemia with target LDL less than 100     - statin intolerant    -- tot 258 (H), HDL 45, , LDLc 186 (H) (1/10/2015); 10yr ASCVD risk 22.5%  -- tot 146, , HDL 51, LDLc 72 (7/8/2014)  -- Sept 2013: tot 139, LDL 76, HDL 42,       Chronic gastritis    Esophagitis     Chronic esophgitis by bx with irregular z-line (Mar 2013) by Dr. Elyse Li (DLDS).  DDD (degenerative disc disease), lumbosacral     L-spine XR (Dec 2016): Mild L4-S1 degenerative endplate reactive changes and disc space height loss. Moderate bilateral facet arthropathy L4-S1. Bilateral oblique views demonstrate moderate right and mild left L5-S1 foraminal stenoses.  Statin intolerance    Advance care planning    Obesity    Right thyroid nodule     Followed by Dr. Oswaldo Ponce (endo).     -- 6/10/2014 [endo]: FNA Bx negative for malignancy      Vitamin D deficiency     - VitD 34.4 (7/8/2014)    *5/7/2014: VitD 14.6 (L)  - start 50k 2x/wk      Elevated TSH    Decreased libido     -- 3/4/2014: slightly low free testosterone, normal PSA, but slightly elevated TSH      TERRY (obstructive sleep apnea)     Followed by Dr. Mahesh MACE [facility])  -- 12/17/2013: overnight sleep study pending        Myalgia     *10/9/2014: improved off of januvia  *5/7/2014: sx unchanged off crestor    -- 3/4/2014: continue to hold crestor; advised massage & stretching  --  (2/21/2014); holding crestor until next appt  -- aldolase wnl (2/21/2014)      Routine health maintenance     -- c-scope: 4/6/2012 by Jennifer Park ([address]), hyperplastic polyps; next due in 10 yrs (Apr 2022)      Neural foraminal stenosis of cervical spine     C-spine MRI (Apr 2013):  - focal R paracentral disc protrusion @ C3-4 with mild cord impingement  - mild to mod foraminal stenoses, primarily @ C3-4 and C4-5 on right  - no C1-2 rheumatoid instability      Hemoglobin C trait (HCC)     Seen on Hgb electrophoresis on 5/29/2013. Medications:     Current Outpatient Prescriptions   Medication Sig    metFORMIN (GLUCOPHAGE) 1,000 mg tablet TAKE 1 TABLET TWICE DAILY WITH MEALS    losartan-hydroCHLOROthiazide (HYZAAR) 50-12.5 mg per tablet TAKE 1 TABLET EVERY DAY    ACCU-CHEK SMARTVIEW TEST STRIP strip CHECK BLOOD SUGAR TWICE DAILY    traMADol (ULTRAM) 50 mg tablet TAKE 1 TABLET EVERY 8 HOURS AS NEEDED FOR PAIN  ( MAXIMUM DAILY AMOUNT:  150MG  )    leflunomide (ARAVA) 20 mg tablet Take 1 Tab by mouth daily.  ACCU-CHEK SMARTVIEW CONTRL SOL soln USE AS DIRECTED    omeprazole (PRILOSEC) 40 mg capsule TAKE 1 CAPSULE EVERY DAY    amitriptyline (ELAVIL) 25 mg tablet Take 1 Tab by mouth nightly. Take amitriptyline about 1 hour before bedtime (Patient taking differently: Take 25 mg by mouth nightly as needed. Take amitriptyline about 1 hour before bedtime)    acetaminophen (TYLENOL) 500 mg tablet 500 mg as needed.  red yeast rice extract 600 mg cap Take 1,800 mg by mouth daily.  fluticasone (FLONASE) 50 mcg/actuation nasal spray 2 Sprays by Both Nostrils route daily.  Walker (ULTRA-LIGHT ROLLATOR) Misc Rollator Walker. Use as directed.  aspirin 81 mg tablet Take 1 Tab by mouth daily.  butalbital-acetaminophen-caffeine (FIORICET, ESGIC) -40 mg per tablet Take 1 Tab by mouth every four (4) hours as needed for Pain. Max Daily Amount: 6 Tabs. No current facility-administered medications for this visit.         Review of Systems:     (positives in bold)   Constitutional: fatigue, weight change, appetite change, fevers, chills   Eyes: itchy eyes, runny eyes, red eyes, eye discharge, vision changes, light sensitivity   Neuro: headaches, vision changes, dizziness, loss of consciousness, burning pain, tingling, numbness   ENT: ear pain, ear pressure, ear popping, ear discharge/drainage, hearing change,nosebleeds, sneezing, runny nose, nasal congestion,  change in sense of smell, sore throat, voice change or hoarse voice,   dry mouth, toothache, jaw popping, difficulty swallowing, painful swallowing,   oral ulcers or canker sores   Cardiovascular: chest pain, palpitations, orthopnea, PND   Respiratory: dyspnea, wheezing, cough, sputum production, hemoptysis   GI: nausea, vomiting, heartburn, abdominal pain, greasy stools,   blood in stool, diarrhea, constipation   : dysuria, hematuria, change in urine, urinary frequency, urinary urgency   MSK: muscle/joint pain, joint swelling   Derm: rashes, skin changes   Allergy/Imm: seasonal allergies, itchy eyes   Endocrine: Polyuria, polydipsia, polyphagia, heat intolerance, cold intolerance   Heme/lymph: easy bleeding/bruising   Psych: Suicidal ideation, homicidal ideation           Physical Assessment:   VS:    Vitals:    05/05/17 0957   BP: 137/86   Pulse: 78   Resp: 18   Temp: 97.5 °F (36.4 °C)   TempSrc: Oral   SpO2: 94%   Weight: 197 lb 3.2 oz (89.4 kg)   Height: 5' 9\" (1.753 m)   PainSc:   4   PainLoc: Back       General:     Well-developed, well-nourished male, in NAD    Head:      PERRL, EOMI.  MMM, fair dentition, oropharynx otherwise WNL. No facial asymmetry noted. Cardiovasc:     No JVD. RRR, no MRG. Pulses 2+ and symmetric at distal extremities. Pulmonary:     Lungs clear bilaterally. Normal respiratory effort. Extremities:     No edema, no TTP bilateral calves. No joint effusions. LEs warm and well-perfused. Neuro:     Alert and oriented, CNs II-XII intact, no focal deficits. Skin:      No rashes noted. Psych:    pleasant, and conversant. Affect, mood & judgment appropriate.       Recent Labs & Imaging:     Lab Results   Component Value Date/Time    WBC 5.2 04/17/2017 09:30 AM    HGB 13.8 04/17/2017 09:30 AM    HCT 40.9 04/17/2017 09:30 AM    PLATELET 397 53/07/0297 09:30 AM    MCV 75 04/17/2017 09:30 AM     Lab Results   Component Value Date/Time    Sodium 139 04/17/2017 09:30 AM    Potassium 4.2 04/17/2017 09:30 AM    Chloride 96 04/17/2017 09:30 AM    CO2 24 04/17/2017 09:30 AM    Anion gap 8 11/23/2015 11:32 AM    Glucose 119 04/17/2017 09:30 AM    BUN 11 04/17/2017 09:30 AM    Creatinine 0.88 04/17/2017 09:30 AM    BUN/Creatinine ratio 13 04/17/2017 09:30 AM    GFR est  04/17/2017 09:30 AM    GFR est non-AA 91 04/17/2017 09:30 AM    Calcium 9.9 04/17/2017 09:30 AM    Bilirubin, total 0.4 04/17/2017 09:30 AM    AST (SGOT) 19 04/17/2017 09:30 AM    Alk.  phosphatase 103 04/17/2017 09:30 AM    Protein, total 7.6 04/17/2017 09:30 AM    Albumin 4.4 04/17/2017 09:30 AM    Globulin 3.7 11/23/2015 11:32 AM    A-G Ratio 1.4 04/17/2017 09:30 AM    ALT (SGPT) 23 04/17/2017 09:30 AM     Lab Results   Component Value Date/Time    Cholesterol, total 273 04/17/2017 09:30 AM    HDL Cholesterol 45 04/17/2017 09:30 AM    LDL, calculated 201 04/17/2017 09:30 AM    VLDL, calculated 27 04/17/2017 09:30 AM    Triglyceride 133 04/17/2017 09:30 AM    CHOL/HDL Ratio 7.0 11/23/2015 11:32 AM     Lab Results   Component Value Date/Time    Hemoglobin A1c 6.5 04/17/2017 09:30 AM    Hemoglobin A1c (POC) 6.7 05/23/2016 10:31 AM

## 2017-05-05 NOTE — PROGRESS NOTES
Ciarra Reddy. is a 57 y.o. male patient is here for routine physical and would like to also discuss a sore on his right hand. 1. Have you been to the ER, urgent care clinic or hospitalized since your last visit? NO.     2. Have you seen or consulted any other health care providers outside of the [address] [place] [address] since your last visit (Include any pap smears or colon screening)? NO      Do you have an Advanced Directive? YES    Would you like information on Advanced Directives?  NO  Learning Assessment 5/7/2014   PRIMARY LEARNER Patient   HIGHEST LEVEL OF EDUCATION - PRIMARY LEARNER  SOME COLLEGE   BARRIERS PRIMARY LEARNER NONE   CO-LEARNER CAREGIVER No   PRIMARY LANGUAGE ENGLISH   LEARNER PREFERENCE PRIMARY OTHER (COMMENT)   ANSWERED BY Patient   RELATIONSHIP SELF

## 2017-05-05 NOTE — MR AVS SNAPSHOT
Visit Information Date & Time Provider Department Dept. Phone Encounter #  
 5/5/2017 10:00 AM Daphne Hurley, 3 [facility] [phone] [id number] Follow-up Instructions Return in about 6 months (around 11/5/2017) for 20min follow-up. Upcoming Health Maintenance Date Due INFLUENZA AGE 9 TO ADULT 8/1/2017 FOOT EXAM Q1 9/8/2017 HEMOGLOBIN A1C Q6M 10/17/2017 MICROALBUMIN Q1 12/9/2017 EYE EXAM RETINAL OR DILATED Q1 12/14/2017 LIPID PANEL Q1 4/17/2018 COLONOSCOPY 4/6/2022 DTaP/Tdap/Td series (3 - Td) 4/20/2025 Allergies as of 5/5/2017  Review Complete On: 5/5/2017 By: Daphne Hurley MD  
  
 Severity Noted Reaction Type Reactions Statins-hmg-coa Reductase Inhibitors Medium 04/20/2015    Myalgia Current Immunizations  Reviewed on 4/20/2015 Name Date Influenza Vaccine 9/8/2016 10:01 AM, 11/23/2015 10:01 AM, 9/29/2014, 10/16/2013, 10/18/2012 Pneumococcal Conjugate (PCV-13) 11/17/2016 Pneumococcal Polysaccharide (PPSV-23) 8/11/2009 TB Skin Test (PPD) Intradermal 2/19/2013 Tdap 4/20/2015 10:12 AM, 2/19/2009 Zoster Vaccine, Live 10/17/2016 Not reviewed this visit You Were Diagnosed With   
  
 Codes Comments Rheumatoid arthritis, involving unspecified site, unspecified rheumatoid factor presence ([facility]    -  Primary ICD-10-CM: M06.9 ICD-9-CM: 714.0 Diabetes mellitus type 2, noninsulin dependent ([facility]     ICD-10-CM: E11.9 ICD-9-CM: 250.00 Essential hypertension     ICD-10-CM: I10 
ICD-9-CM: 401.9 Hyperlipidemia with target LDL less than 100     ICD-10-CM: E78.5 ICD-9-CM: 272.4 Vitals BP Pulse Temp Resp Height(growth percentile) Weight(growth percentile) 137/86 (BP 1 Location: Right arm, BP Patient Position: Sitting) 78 97.5 °F (36.4 °C) (Oral) 18 5' 9\" (1.753 m) 197 lb 3.2 oz (89.4 kg) SpO2 BMI Smoking Status 94% 29.12 kg/m2 Former Smoker BMI and BSA Data Body Mass Index Body Surface Area  
 29.12 kg/m 2 2.09 m 2 Preferred Pharmacy Pharmacy Name Phone [facility] [address] [place] [address] [address] [phone] Your Updated Medication List  
  
   
This list is accurate as of: 5/5/17 10:20 AM.  Always use your most recent med list.  
  
  
  
  
 ACCU-CHEK SMARTVIEW CONTRL SOL Soln Generic drug:  Blood Glucose Control, Normal  
USE AS DIRECTED ACCU-CHEK SMARTVIEW TEST STRIP strip Generic drug:  glucose blood VI test strips CHECK BLOOD SUGAR TWICE DAILY  
  
 acetaminophen 500 mg tablet Commonly known as:  TYLENOL  
500 mg as needed. amitriptyline 25 mg tablet Commonly known as:  ELAVIL Take 1 Tab by mouth nightly. Take amitriptyline about 1 hour before bedtime  
  
 aspirin 81 mg tablet Take 1 Tab by mouth daily. fluticasone 50 mcg/actuation nasal spray Commonly known as:  Creasie Mainor 2 Sprays by Both Nostrils route daily. leflunomide 20 mg tablet Commonly known as:  Gloriann Craze Take 1 Tab by mouth daily. losartan-hydroCHLOROthiazide 50-12.5 mg per tablet Commonly known as:  HYZAAR  
TAKE 1 TABLET EVERY DAY  
  
 metFORMIN 1,000 mg tablet Commonly known as:  GLUCOPHAGE  
TAKE 1 TABLET TWICE DAILY WITH MEALS  
  
 omeprazole 40 mg capsule Commonly known as:  PRILOSEC  
TAKE 1 CAPSULE EVERY DAY  
  
 red yeast rice extract 600 mg Cap Take 1,800 mg by mouth daily. traMADol 50 mg tablet Commonly known as:  ULTRAM  
TAKE 1 TABLET EVERY 8 HOURS AS NEEDED FOR PAIN  ( MAXIMUM DAILY AMOUNT:  150MG  ) Lui Liu Commonly known as:  ULTRA-LIGHT ROLLATOR Rollator Walker. Use as directed. Follow-up Instructions Return in about 6 months (around 11/5/2017) for 20min follow-up. Patient Instructions To Do: 
· Increase your red yeast rice to 4 per day Notes from your doctor: · [employer] by Lashell Goodwin near [facility] for weight lifting hand wraps Introducing [facility]! Barrett Ramos introduces [employer] patient portal. Now you can access parts of your medical record, email your doctor's office, and request medication refills online. 1. In your internet browser, go to [email or web address] [employer] 2. Click on the First Time User? Click Here link in the Sign In box. You will see the New Member Sign Up page. 3. Enter your [employer] Access Code exactly as it appears below. You will not need to use this code after youve completed the sign-up process. If you do not sign up before the expiration date, you must request a new code. · [employer] Access Code: [email or web address] Expires: 8/3/2017 10:00 AM 
 
4. Enter the last four digits of your Social Security Number (xxxx) and Date of Birth (mm/dd/yyyy) as indicated and click Submit. You will be taken to the next sign-up page. 5. Create a [employer] ID. This will be your [employer] login ID and cannot be changed, so think of one that is secure and easy to remember. 6. Create a [employer] password. You can change your password at any time. 7. Enter your Password Reset Question and Answer. This can be used at a later time if you forget your password. 8. Enter your e-mail address. You will receive e-mail notification when new information is available in [address] 9. Click Sign Up. You can now view and download portions of your medical record. 10. Click the Download Summary menu link to download a portable copy of your medical information. If you have questions, please visit the Frequently Asked Questions section of the [employer] website. Remember, [employer] is NOT to be used for urgent needs. For medical emergencies, dial 911. Now available from your iPhone and Android! Please provide this summary of care documentation to your next provider. Your primary care clinician is listed as Estevan Orellana. If you have any questions after today's visit, please call [phone].

## 2017-05-09 RX ORDER — OMEPRAZOLE 40 MG/1
40 CAPSULE, DELAYED RELEASE ORAL DAILY
Qty: 90 CAP | Refills: 3 | Status: SHIPPED | OUTPATIENT
Start: 2017-05-09 | End: 2018-02-09

## 2017-06-01 ENCOUNTER — TELEPHONE (OUTPATIENT)
Dept: FAMILY MEDICINE CLINIC | Age: 62
End: 2017-06-01

## 2017-06-01 NOTE — TELEPHONE ENCOUNTER
[employer] called requesting a [facility] referral for the pt. Pt has an appt 6/5/17 for a skin tag/bump near eye. Their fax number [phone].

## 2017-06-14 ENCOUNTER — OFFICE VISIT (OUTPATIENT)
Dept: FAMILY MEDICINE CLINIC | Age: 62
End: 2017-06-14

## 2017-06-14 VITALS
HEART RATE: 81 BPM | TEMPERATURE: 97.6 F | OXYGEN SATURATION: 96 % | BODY MASS INDEX: 28.71 KG/M2 | WEIGHT: 193.8 LBS | SYSTOLIC BLOOD PRESSURE: 144 MMHG | HEIGHT: 69 IN | DIASTOLIC BLOOD PRESSURE: 82 MMHG | RESPIRATION RATE: 17 BRPM

## 2017-06-14 DIAGNOSIS — R07.89 ATYPICAL CHEST PAIN: Primary | ICD-10-CM

## 2017-06-14 DIAGNOSIS — M94.0 COSTOCHONDRITIS: ICD-10-CM

## 2017-06-14 RX ORDER — FLUTICASONE PROPIONATE 50 MCG
2 SPRAY, SUSPENSION (ML) NASAL DAILY
Qty: 3 BOTTLE | Refills: 1 | Status: SHIPPED | OUTPATIENT
Start: 2017-06-14

## 2017-06-14 NOTE — MR AVS SNAPSHOT
Visit Information Date & Time Provider Department Dept. Phone Encounter #  
 6/14/2017 11:10 AM Joseph Ferrell MD 3 [facility] [phone] [id number] Your Appointments 11/10/2017 10:00 AM  
Follow Up with Joseph Ferrell MD  
3 [facility]) Appt Note: 6 month f/u; r/s  
 828 [facility] [address] [place] [id number] [phone]  
  
   
 1453 Taisha Vidal [address] [place] [address] Upcoming Health Maintenance Date Due INFLUENZA AGE 9 TO ADULT 8/1/2017 FOOT EXAM Q1 9/8/2017 HEMOGLOBIN A1C Q6M 10/17/2017 MICROALBUMIN Q1 12/9/2017 EYE EXAM RETINAL OR DILATED Q1 12/14/2017 LIPID PANEL Q1 4/17/2018 COLONOSCOPY 4/6/2022 DTaP/Tdap/Td series (3 - Td) 4/20/2025 Allergies as of 6/14/2017  Review Complete On: 6/14/2017 By: Joseph Ferrell MD  
  
 Severity Noted Reaction Type Reactions Statins-hmg-coa Reductase Inhibitors Medium 04/20/2015    Myalgia Current Immunizations  Reviewed on 4/20/2015 Name Date Influenza Vaccine 9/8/2016 10:01 AM, 11/23/2015 10:01 AM, 9/29/2014, 10/16/2013, 10/18/2012 Pneumococcal Conjugate (PCV-13) 11/17/2016 Pneumococcal Polysaccharide (PPSV-23) 8/11/2009 TB Skin Test (PPD) Intradermal 2/19/2013 Tdap 4/20/2015 10:12 AM, 2/19/2009 Zoster Vaccine, Live 10/17/2016 Not reviewed this visit You Were Diagnosed With   
  
 Codes Comments Atypical chest pain    -  Primary ICD-10-CM: R07.89 ICD-9-CM: 786.59 Costochondritis     ICD-10-CM: M94.0 ICD-9-CM: 733.6 Vitals BP Pulse Temp Resp Height(growth percentile) Weight(growth percentile) 144/82 (BP 1 Location: Right arm, BP Patient Position: Sitting) 81 97.6 °F (36.4 °C) (Oral) 17 5' 9\" (1.753 m) 193 lb 12.8 oz (87.9 kg) SpO2 BMI Smoking Status 96% 28.62 kg/m2 Former Smoker Vitals History BMI and BSA Data Body Mass Index Body Surface Area  
 28.62 kg/m 2 2.07 m 2 Preferred Pharmacy Pharmacy Name Phone Karl Willard [address] [place] [address] [address] [phone] Your Updated Medication List  
  
   
This list is accurate as of: 6/14/17 12:02 PM.  Always use your most recent med list.  
  
  
  
  
 ACCU-CHEK SMARTVIEW CONTRL SOL Soln Generic drug:  Blood Glucose Control, Normal  
USE AS DIRECTED ACCU-CHEK SMARTVIEW TEST STRIP strip Generic drug:  glucose blood VI test strips CHECK BLOOD SUGAR TWICE DAILY  
  
 acetaminophen 500 mg tablet Commonly known as:  TYLENOL  
500 mg as needed. amitriptyline 25 mg tablet Commonly known as:  ELAVIL Take 1 Tab by mouth nightly. Take amitriptyline about 1 hour before bedtime  
  
 aspirin 81 mg tablet Take 1 Tab by mouth daily. fluticasone 50 mcg/actuation nasal spray Commonly known as:  Mallory Fray 2 Sprays by Both Nostrils route daily. leflunomide 20 mg tablet Commonly known as:  [place] Take 1 Tab by mouth daily. losartan-hydroCHLOROthiazide 50-12.5 mg per tablet Commonly known as:  HYZAAR  
TAKE 1 TABLET EVERY DAY  
  
 metFORMIN 1,000 mg tablet Commonly known as:  GLUCOPHAGE  
TAKE 1 TABLET TWICE DAILY WITH MEALS  
  
 omeprazole 40 mg capsule Commonly known as:  PRILOSEC Take 1 Cap by mouth daily. red yeast rice extract 600 mg Cap Take 1,800 mg by mouth daily. traMADol 50 mg tablet Commonly known as:  ULTRAM  
TAKE 1 TABLET EVERY 8 HOURS AS NEEDED FOR PAIN  ( MAXIMUM DAILY AMOUNT:  150MG  ) Shelly Shaw Commonly known as:  ULTRA-LIGHT ROLLATOR Rollator Walker. Use as directed. We Performed the Following AMB POC EKG ROUTINE W/ 12 LEADS, INTER & REP [47820 CPT(R)] Patient Instructions Notes from your doctor: · That is an AWESOME looking EKG. Costochondritis: Care Instructions Your Care Instructions You have chest pain because the cartilage of your rib cage is inflamed. This problem is called costochondritis. This type of chest wall pain may last from days to weeks. It is not a heart problem. Sometimes costochondritis occurs with a cold or the flu, and other times the exact cause is not known. Follow-up care is a key part of your treatment and safety. Be sure to make and go to all appointments, and call your doctor if you are having problems. Its also a good idea to know your test results and keep a list of the medicines you take. How can you care for yourself at home? · Take medicines for pain and inflammation exactly as directed. ¨ If the doctor gave you a prescription medicine, take it as prescribed. ¨ If you are not taking a prescription pain medicine, ask your doctor if you can take an over-the-counter medicine. ¨ Do not take two or more pain medicines at the same time unless the doctor told you to. Many pain medicines have acetaminophen, which is Tylenol. Too much acetaminophen (Tylenol) can be harmful. · It may help to use a warm compress or heating pad (set on low) on your chest. You can also try alternating heat and ice. Put ice or a cold pack on the area for 10 to 20 minutes at a time. Put a thin cloth between the ice and your skin. · Avoid any activity that strains the chest area. As your pain gets better, you can slowly return to your normal activities. · Do not use tape, an elastic bandage, a \"rib belt,\" or anything else that restricts your chest wall motion. When should you call for help? Call 911 anytime you think you may need emergency care. For example, call if: 
· You have new or different chest pain or pressure. This may occur with: ¨ Sweating. ¨ Shortness of breath. ¨ Nausea or vomiting. ¨ Pain that spreads from the chest to the neck, jaw, or one or both shoulders or arms. ¨ Dizziness or lightheadedness. ¨ A fast or uneven pulse. After calling 911, chew 1 adult-strength aspirin. Wait for an ambulance. Do not try to drive yourself. · You have severe trouble breathing. Call your doctor now or seek immediate medical care if: 
· You have a fever or cough. · You have any trouble breathing. · Your chest pain gets worse. Watch closely for changes in your health, and be sure to contact your doctor if: 
· Your chest pain continues even though you are taking anti-inflammatory medicine. · Your chest wall pain has not improved after 5 to 7 days. Where can you learn more? Go to http://nikita-hieu.info/. Enter J155 in the search box to learn more about \"Costochondritis: Care Instructions. \" Current as of: May 27, 2016 Content Version: 11.2 © 6876-6449 [employer]. Care instructions adapted under license by [employer] (which disclaims liability or warranty for this information). If you have questions about a medical condition or this instruction, always ask your healthcare professional. [place] [address] any warranty or liability for your use of this information. Introducing [facility]! Tanis Epley introduces [employer] patient portal. Now you can access parts of your medical record, email your doctor's office, and request medication refills online. 1. In your internet browser, go to [email or web address]. [employer] 2. Click on the First Time User? Click Here link in the Sign In box. You will see the New Member Sign Up page. 3. Enter your [employer] Access Code exactly as it appears below. You will not need to use this code after youve completed the sign-up process. If you do not sign up before the expiration date, you must request a new code. · [employer] Access Code: [email or web address] Expires: 8/3/2017 10:00 AM 
 
4. Enter the last four digits of your Social Security Number (xxxx) and Date of Birth (mm/dd/yyyy) as indicated and click Submit.  You will be taken to the next sign-up page. 5. Create a [employer] ID. This will be your [employer] login ID and cannot be changed, so think of one that is secure and easy to remember. 6. Create a [employer] password. You can change your password at any time. 7. Enter your Password Reset Question and Answer. This can be used at a later time if you forget your password. 8. Enter your e-mail address. You will receive e-mail notification when new information is available in [address] 9. Click Sign Up. You can now view and download portions of your medical record. 10. Click the Download Summary menu link to download a portable copy of your medical information. If you have questions, please visit the Frequently Asked Questions section of the [employer] website. Remember, [employer] is NOT to be used for urgent needs. For medical emergencies, dial 911. Now available from your iPhone and Android! Please provide this summary of care documentation to your next provider. Your primary care clinician is listed as Shawna Brian. If you have any questions after today's visit, please call [phone].

## 2017-06-14 NOTE — PROGRESS NOTES
Ciarra Reddy. is a 57 y.o. male here for a chest discomfort that started this morning, however it did decrease after moving around. 1. Have you been to the ER, urgent care clinic or hospitalized since your last visit? NO.     2. Have you seen or consulted any other health care providers outside of the [address] [place] [address] since your last visit (Include any pap smears or colon screening)? Yes Dr. Jr Patel     Do you have an Advanced Directive? NO    Would you like information on Advanced Directives?  NO

## 2017-06-14 NOTE — PROGRESS NOTES
[address] [facility]  Primary Care Office Visit - Problem-Oriented    : 1955   Lyndon Phillips is a 57 y.o. male presenting for  Chief Complaint   Patient presents with    Chest Pain       Assessment/Plan:     1. Atypical chest pain     2. Costochondritis  Initial encounter. EKG without abnormalities, and without change from prior. Reassurance given, including info in AVS.  - AMB POC EKG ROUTINE W/ 12 LEADS, INTER & REP      This document may have been created with the aid of dictation software. Text may contain errors, particularly phonetic errors. Reviewed management plan & instructions with patient, who voiced understanding. Fide Dickson MD  Internal Medicine, Family Medicine & Sports Medicine  2017, 11:23 AM      History:   Lyndon Phillips is a 57 y.o. male presenting to address:  Chief Complaint   Patient presents with    Chest Pain       3:15am anterior sternal chest pain woke him up from sleep. Was a 5 of 10. After a while it eased away. Just sat in the chair. Has not had anything like this before, of this particularly severity. Got better with walking to his wife's room. Sleeps with 2 pillows, no recent change. When going back to sleep, right lateral decubitus, \"felt like something behind the spot feels like it shifted to the right\"    No chest pain with exertion. Feels nearly normal now. Wants to go to the gym today. No diaphoresis with exertion. Past Medical History:   Diagnosis Date    Diabetes [facility]     H. pylori infection     s/p prevpack    H/O esophagogastroduodenoscopy 3/1/2013    irregular z-line, small 1cm submucosal nodule    Hypercholesterolemia     Hypertension     Rheumatoid arthritis ([address]      Past Surgical History:   Procedure Laterality Date    ENDOSCOPY, COLON, DIAGNOSTIC      HX HERNIA REPAIR      HX WISDOM TEETH EXTRACTION        reports that he has quit smoking.  He has never used smokeless tobacco. He reports that he does not drink alcohol or use illicit drugs. Social History     Social History Narrative     History   Smoking Status    Former Smoker   Smokeless Tobacco    Never Used     Family History   Problem Relation Age of Onset    Heart Disease Mother     Heart Disease Father    [facility] Arthritis-rheumatoid Sister     Hypertension Sister     Heart Disease Sister     Heart Disease Brother      Allergies   Allergen Reactions    Statins-Hmg-Coa Reductase Inhibitors Myalgia       Problem List:      Patient Active Problem List    Diagnosis    Essential hypertension     - meds: losartan/HCTZ 50/12.5        Diabetes mellitus type 2, noninsulin dependent ([facility] [address] [facility]     - meds: metformin 1000mg BID    - A1c 7.5 (Nov 2015)  - A1c 6.5 (4/14/2015)    - foot exam: 7/8/2014    Intolerant of statins.           Rheumatoid arthritis ([facility] [address]     -1/16/2017 [Dr. Melvin Kimbrough: seropositive long-standing disease on arava monotherapy, reporting RAPID3 16.3; sicca syndrome; r/o Sjogrens, labs, possibly US to eval disease activity    -4/26/2016: [Rheum] refilled leflunomide 20 mg daily, noted that he would be switching to a rheumatologist closer to home  -2/17/2016: continue on leflunomide, plaquenil, labs: cbc, ESR, CMP, CRP, B hand xrays;  follow up 2 mo    Previously followed by Dr. Mame Swan (rheum)    -- 9/29/2014 [rheum]: cont humira, plaquenil & tramadol; stop prednisone; flu shot given  -- 7/29/2014 [rheum]: cont humira & plaquenil; reduce prednisone to 2.5mg daily  -- 4/29/2014 [rheum]: combo of RA & DJD; continue humira, plaquenil & prednisone 5mg daily; added tramadol qhs prn; avoid NSAIDs; recs: check neck US to rule out lymph node swelling (R side, above clavicle)  -- Mar 2013: RF = 588      Hyperlipidemia with target LDL less than 100     - statin intolerant    -- tot 258 (H), HDL 45, , LDLc 186 (H) (1/10/2015); 10yr ASCVD risk 22.5%  -- tot 146, , HDL 51, LDLc 72 (7/8/2014)  -- Sept 2013: tot 139, LDL 76, HDL 42,       Chronic gastritis    Esophagitis     Chronic esophgitis by bx with irregular z-line (Mar 2013) [facility]  DDD (degenerative disc disease), lumbosacral     L-spine XR (Dec 2016): Mild L4-S1 degenerative endplate reactive changes and disc space height loss. Moderate bilateral facet arthropathy L4-S1. Bilateral oblique views demonstrate moderate right and mild left L5-S1 foraminal stenoses.  Statin intolerance    Advance care planning    Obesity    Right thyroid nodule     Followed by Dr. Joshua Masters (endo). -- 6/10/2014 [endo]: FNA Bx negative for malignancy      Vitamin D deficiency     - VitD 34.4 (7/8/2014)    *5/7/2014: VitD 14.6 (L)  - start 50k 2x/wk      Elevated TSH    Decreased libido     -- 3/4/2014: slightly low free testosterone, normal PSA, but slightly elevated TSH      TERRY (obstructive sleep apnea)     Followed by Dr. Palak [facility])  -- 12/17/2013: overnight sleep study pending        Myalgia     *10/9/2014: improved off of januvia  *5/7/2014: sx unchanged off crestor    -- 3/4/2014: continue to hold crestor; advised massage & stretching  --  (2/21/2014); holding crestor until next appt  -- aldolase wnl (2/21/2014)      Routine health maintenance     -- c-scope: 4/6/2012 by Priya Gallo ([address]), hyperplastic polyps; next due in 10 yrs (Apr 2022)      Neural foraminal stenosis of cervical spine     C-spine MRI (Apr 2013):  - focal R paracentral disc protrusion @ C3-4 with mild cord impingement  - mild to mod foraminal stenoses, primarily @ C3-4 and C4-5 on right  - no C1-2 rheumatoid instability      Hemoglobin C trait ([address]     Seen on Hgb electrophoresis on 5/29/2013. Medications:     Current Outpatient Prescriptions   Medication Sig    omeprazole (PRILOSEC) 40 mg capsule Take 1 Cap by mouth daily.     metFORMIN (GLUCOPHAGE) 1,000 mg tablet TAKE 1 TABLET TWICE DAILY WITH MEALS    losartan-hydroCHLOROthiazide (HYZAAR) 50-12.5 mg per tablet TAKE 1 TABLET EVERY DAY    ACCU-CHEK SMARTVIEW TEST STRIP strip CHECK BLOOD SUGAR TWICE DAILY    traMADol (ULTRAM) 50 mg tablet TAKE 1 TABLET EVERY 8 HOURS AS NEEDED FOR PAIN  ( MAXIMUM DAILY AMOUNT:  150MG  )    leflunomide (ARAVA) 20 mg tablet Take 1 Tab by mouth daily.  ACCU-CHEK SMARTVIEW CONTRL SOL soln USE AS DIRECTED    amitriptyline (ELAVIL) 25 mg tablet Take 1 Tab by mouth nightly. Take amitriptyline about 1 hour before bedtime (Patient taking differently: Take 25 mg by mouth nightly as needed. Take amitriptyline about 1 hour before bedtime)    acetaminophen (TYLENOL) 500 mg tablet 500 mg as needed.  red yeast rice extract 600 mg cap Take 1,800 mg by mouth daily.  fluticasone (FLONASE) 50 mcg/actuation nasal spray 2 Sprays by Both Nostrils route daily.  Walker (ULTRA-LIGHT ROLLATOR) Misc Rollator Walker. Use as directed.  aspirin 81 mg tablet Take 1 Tab by mouth daily. No current facility-administered medications for this visit.         Review of Systems:     (positives in bold)   Constitutional: fatigue, weight change, appetite change, fevers, chills   Eyes: itchy eyes, runny eyes, red eyes, eye discharge, vision changes, light sensitivity   Neuro: headaches, vision changes, dizziness, loss of consciousness, burning pain, tingling, numbness   ENT: ear pain, ear pressure, ear popping, ear discharge/drainage, hearing change,nosebleeds, sneezing, runny nose, nasal congestion,  change in sense of smell, sore throat, voice change or hoarse voice,   dry mouth, toothache, jaw popping, difficulty swallowing, painful swallowing,   oral ulcers or canker sores   Cardiovascular: chest pain, palpitations, orthopnea, PND   Respiratory: dyspnea, wheezing, cough, sputum production, hemoptysis   GI: nausea, vomiting, heartburn, abdominal pain, greasy stools,   blood in stool, diarrhea, constipation   : dysuria, hematuria, change in urine, urinary frequency, urinary urgency   MSK: muscle/joint pain, joint swelling   Derm: rashes, skin changes   Allergy/Imm: seasonal allergies, itchy eyes   Endocrine: Polyuria, polydipsia, polyphagia, heat intolerance, cold intolerance   Heme/lymph: easy bleeding/bruising   Psych: Suicidal ideation, homicidal ideation           Physical Assessment:   VS:    Vitals:    06/14/17 1114 06/14/17 1131   BP: (!) 154/93 144/82   Pulse: 81    Resp: 17    Temp: 97.6 °F (36.4 °C)    TempSrc: Oral    SpO2: 96%    Weight: 193 lb 12.8 oz (87.9 kg)    Height: 5' 9\" (1.753 m)    PainSc:   3    PainLoc: Generalized        General:     Well-developed, well-nourished male, in NAD    Head:      No facial asymmetry noted. Cardiovasc:     No JVD. RRR, no MRG. Pulses 2+ and symmetric at distal extremities. Pulmonary:     Lungs clear bilaterally. Normal respiratory effort. MSK:  TTP L > R costochondral joints, reproducing chief complaint  No pain with AP and lateral compression of thorax  Extremities:     No edema, no TTP bilateral calves. No joint effusions. LEs warm and well-perfused. Neuro:     Alert and oriented, CNs II-XII intact, no focal deficits. Skin:      No rashes noted. T  Psych:    pleasant, and conversant. Affect, mood & judgment appropriate. Recent Labs & Imaging:     EKG (6/14/2017):   Sinus Rhythm, no ST-T changes, no significant change from prior

## 2017-07-20 NOTE — PATIENT INSTRUCTIONS
Notes from your doctor:  · That is an AWESOME looking EKG. Costochondritis: Care Instructions  Your Care Instructions  You have chest pain because the cartilage of your rib cage is inflamed. This problem is called costochondritis. This type of chest wall pain may last from days to weeks. It is not a heart problem. Sometimes costochondritis occurs with a cold or the flu, and other times the exact cause is not known. Follow-up care is a key part of your treatment and safety. Be sure to make and go to all appointments, and call your doctor if you are having problems. Its also a good idea to know your test results and keep a list of the medicines you take. How can you care for yourself at home? · Take medicines for pain and inflammation exactly as directed. ¨ If the doctor gave you a prescription medicine, take it as prescribed. ¨ If you are not taking a prescription pain medicine, ask your doctor if you can take an over-the-counter medicine. ¨ Do not take two or more pain medicines at the same time unless the doctor told you to. Many pain medicines have acetaminophen, which is Tylenol. Too much acetaminophen (Tylenol) can be harmful. · It may help to use a warm compress or heating pad (set on low) on your chest. You can also try alternating heat and ice. Put ice or a cold pack on the area for 10 to 20 minutes at a time. Put a thin cloth between the ice and your skin. · Avoid any activity that strains the chest area. As your pain gets better, you can slowly return to your normal activities. · Do not use tape, an elastic bandage, a \"rib belt,\" or anything else that restricts your chest wall motion. When should you call for help? Call 911 anytime you think you may need emergency care. For example, call if:  · You have new or different chest pain or pressure. This may occur with:  ¨ Sweating. ¨ Shortness of breath. ¨ Nausea or vomiting.   ¨ Pain that spreads from the chest to the neck, jaw, or one or both shoulders or arms. ¨ Dizziness or lightheadedness. ¨ A fast or uneven pulse. After calling 911, chew 1 adult-strength aspirin. Wait for an ambulance. Do not try to drive yourself. · You have severe trouble breathing. Call your doctor now or seek immediate medical care if:  · You have a fever or cough. · You have any trouble breathing. · Your chest pain gets worse. Watch closely for changes in your health, and be sure to contact your doctor if:  · Your chest pain continues even though you are taking anti-inflammatory medicine. · Your chest wall pain has not improved after 5 to 7 days. Where can you learn more? Go to http://nikita-hieu.info/. Enter D925 in the search box to learn more about \"Costochondritis: Care Instructions. \"  Current as of: May 27, 2016  Content Version: 11.2  © 9748-0675 [employer]. Care instructions adapted under license by [employer] (which disclaims liability or warranty for this information). If you have questions about a medical condition or this instruction, always ask your healthcare professional. [address] any warranty or liability for your use of this information.  used

## 2017-09-06 ENCOUNTER — CLINICAL SUPPORT (OUTPATIENT)
Dept: FAMILY MEDICINE CLINIC | Age: 62
End: 2017-09-06

## 2017-09-06 VITALS — SYSTOLIC BLOOD PRESSURE: 132 MMHG | DIASTOLIC BLOOD PRESSURE: 86 MMHG

## 2017-09-06 DIAGNOSIS — Z23 ENCOUNTER FOR IMMUNIZATION: Primary | ICD-10-CM

## 2017-09-21 RX ORDER — BLOOD-GLUCOSE METER
1 EACH MISCELLANEOUS DAILY
Qty: 1 EACH | Refills: 0
Start: 2017-09-21 | End: 2019-03-06 | Stop reason: CLARIF

## 2017-10-12 ENCOUNTER — OFFICE VISIT (OUTPATIENT)
Dept: FAMILY MEDICINE CLINIC | Age: 62
End: 2017-10-12

## 2017-10-12 VITALS
WEIGHT: 193.4 LBS | BODY MASS INDEX: 28.64 KG/M2 | SYSTOLIC BLOOD PRESSURE: 140 MMHG | RESPIRATION RATE: 18 BRPM | TEMPERATURE: 98.7 F | OXYGEN SATURATION: 96 % | DIASTOLIC BLOOD PRESSURE: 76 MMHG | HEART RATE: 76 BPM | HEIGHT: 69 IN

## 2017-10-12 DIAGNOSIS — R14.3 EXCESSIVE GAS: Primary | ICD-10-CM

## 2017-10-12 DIAGNOSIS — Z87.19 HISTORY OF GASTRITIS: ICD-10-CM

## 2017-10-12 NOTE — PROGRESS NOTES
HISTORY OF PRESENT ILLNESS  Magi Gil Sr. is a 57 y.o. male. Abdominal Pain   The history is provided by the patient and medical records. This is a chronic problem. Associated symptoms include abdominal pain (diffuse, off and on x months). Chart review results include chronic gastritis on his active problem list. He reports having had an EGD during a previous hospitalization with GI symptoms. He was told the problem then was caused by a medication long since discontinued. He reports colonoscopy up to date and negative. 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    Chronic gastritis K29.50    Esophagitis K20.9    Neural foraminal stenosis of cervical spine M99.81    Routine health maintenance Z00.00    Myalgia M79.1    TERRY (obstructive sleep apnea) G47.33    Decreased libido R68.82    Elevated TSH R94.6    Vitamin D deficiency E55.9    Right thyroid nodule E04.1    Essential hypertension I10    Advance care planning Z71.89    Obesity E66.9    Statin intolerance Z78.9    DDD (degenerative disc disease), lumbosacral M51.37       Current Outpatient Prescriptions:     Blood-Glucose Meter (ACCU-CHEK IDANIA) misc, 1 Each by Does Not Apply route daily. , Disp: 1 Each, Rfl: 0    losartan-hydroCHLOROthiazide (HYZAAR) 50-12.5 mg per tablet, TAKE 1 TABLET EVERY DAY, Disp: 90 Tab, Rfl: 3    fluticasone (FLONASE) 50 mcg/actuation nasal spray, 2 Sprays by Both Nostrils route daily. , Disp: 3 Bottle, Rfl: 1    omeprazole (PRILOSEC) 40 mg capsule, Take 1 Cap by mouth daily. , Disp: 90 Cap, Rfl: 3    metFORMIN (GLUCOPHAGE) 1,000 mg tablet, TAKE 1 TABLET TWICE DAILY WITH MEALS, Disp: 180 Tab, Rfl: 1    ACCU-CHEK SMARTVIEW TEST STRIP strip, CHECK BLOOD SUGAR TWICE DAILY, Disp: 200 Strip, Rfl: 3    traMADol (ULTRAM) 50 mg tablet, TAKE 1 TABLET EVERY 8 HOURS AS NEEDED FOR PAIN  ( MAXIMUM DAILY AMOUNT:  150MG  ), Disp: 45 Tab, Rfl: 1    leflunomide (ARAVA) 20 mg tablet, Take 1 Tab by mouth daily. , Disp: 90 Tab, Rfl: 1    ACCU-CHEK SMARTVIEW CONTRL SOL soln, USE AS DIRECTED, Disp: 1 mL, Rfl: 3    acetaminophen (TYLENOL) 500 mg tablet, 500 mg as needed. , Disp: , Rfl:     red yeast rice extract 600 mg cap, Take 1,800 mg by mouth daily. , Disp: , Rfl:     Walker (ULTRA-LIGHT ROLLATOR) Misc, Rollator Walker. Use as directed., Disp: 1 Each, Rfl: 0    aspirin 81 mg tablet, Take 1 Tab by mouth daily. , Disp: 30 Tab, Rfl: 2      Review of Systems   Constitutional: Negative for fever and weight loss. Gastrointestinal: Positive for abdominal pain (diffuse, off and on x months), diarrhea (last night) and nausea. Negative for blood in stool, constipation, heartburn, melena and vomiting. Reports flatulence, frequent eructations  Symptoms not related to eating     Visit Vitals    /76 (BP 1 Location: Left arm, BP Patient Position: Sitting)    Pulse 76    Temp 98.7 °F (37.1 °C) (Oral)    Resp 18    Ht 5' 9\" (1.753 m)    Wt 193 lb 6.4 oz (87.7 kg)    SpO2 96%    BMI 28.56 kg/m2       Physical Exam   Constitutional: He is oriented to person, place, and time. He appears well-developed and well-nourished. Weight stable   HENT:   Head: Normocephalic. Eyes: EOM are normal.   Neck: Neck supple. Cardiovascular: Normal rate, regular rhythm and normal heart sounds. Pulmonary/Chest: Effort normal and breath sounds normal.   Abdominal: Soft. Bowel sounds are normal. He exhibits no mass. There is tenderness (mild diffuse-\"uncomfortable\" with palpation but not really tender). There is no rebound and no guarding. Musculoskeletal: He exhibits no edema. Neurological: He is alert and oriented to person, place, and time. Skin: Skin is warm and dry. Psychiatric: He has a normal mood and affect.  His behavior is normal.   Nursing note and vitals reviewed. ASSESSMENT and PLAN    ICD-10-CM ICD-9-CM    1. Excessive gas R14.3 787.3    2. History of gastritis Z87.19 V12.79    Continue omeprazole  Avoid carbonated beverages, citrus, dairy, caffeine, tomato and NSAIDs  Try an OTC simethicone agent for gas such as Phazyme, Gas-Ex, Beano  Try an OTC probiotic-would recommend Culturelle  Follow up with dr. Cande Lindquist for new symptoms, worsening symptoms or failure to improve.

## 2017-10-12 NOTE — PROGRESS NOTES
Abigail Burrows is a 57 y.o. male here for abdominal pain         Abigail Burrows is a 57 y.o. male (: 1955) presenting to address:    Chief Complaint   Patient presents with    Abdominal Pain     pt states for a few months he's had abdominal pain off and on. also reports nausea, gasy, diarrhea        Vitals:    10/12/17 1353   BP: 140/76   Pulse: 76   Resp: 18   SpO2: 96%   Weight: 193 lb 6.4 oz (87.7 kg)   Height: 5' 9\" (1.753 m)   PainSc:   6   PainLoc: Abdomen       Hearing/Vision:   No exam data present    Learning Assessment:     Learning Assessment 2014   PRIMARY LEARNER Patient   HIGHEST LEVEL OF EDUCATION - PRIMARY LEARNER  SOME COLLEGE   BARRIERS PRIMARY LEARNER NONE   CO-LEARNER CAREGIVER No   PRIMARY LANGUAGE ENGLISH   LEARNER PREFERENCE PRIMARY OTHER (COMMENT)   ANSWERED BY Patient   RELATIONSHIP SELF     Depression Screening:     PHQ over the last two weeks 10/12/2017   Little interest or pleasure in doing things Not at all   Feeling down, depressed or hopeless Not at all   Total Score PHQ 2 0   Trouble falling or staying asleep, or sleeping too much -   Feeling tired or having little energy -   Poor appetite or overeating -   Feeling bad about yourself - or that you are a failure or have let yourself or your family down -   Trouble concentrating on things such as school, work, reading or watching TV -   Moving or speaking so slowly that other people could have noticed; or the opposite being so fidgety that others notice -   Thoughts of being better off dead, or hurting yourself in some way -   How difficult have these problems made it for you to do your work, take care of your home and get along with others -     Fall Risk Assessment:     Fall Risk Assessment, last 12 mths 2016   Able to walk? Yes   Fall in past 12 months? No     Abuse Screening:     Abuse Screening Questionnaire 2016   Do you ever feel afraid of your partner?  N   Are you in a relationship with someone who physically or mentally threatens you? N   Is it safe for you to go home? Y     Coordination of Care Questionaire:   1. Have you been to the ER, urgent care clinic since your last visit? Hospitalized since your last visit? NO    2. Have you seen or consulted any other health care providers outside of the [address] [place] [address] since your last visit? Include any pap smears or colon screening. NO    Advanced Directive:   1. Do you have an Advanced Directive? NO    2. Would you like information on Advanced Directives?  NO

## 2017-10-12 NOTE — MR AVS SNAPSHOT
Visit Information Date & Time Provider Department Dept. Phone Encounter #  
 10/12/2017  2:00 PM Tejinder Daley, 3 [facility] [phone] [id number] Your Appointments 11/10/2017 10:00 AM  
Follow Up with Sindhu Koch MD  
3 [facility] Appt Note: 6 month f/u; r/s  
 [address] [address] [phone]  
  
   
 [address] [address] [address] Upcoming Health Maintenance Date Due  
 FOOT EXAM Q1 9/8/2017 HEMOGLOBIN A1C Q6M 10/17/2017 MICROALBUMIN Q1 12/9/2017 LIPID PANEL Q1 4/17/2018 EYE EXAM RETINAL OR DILATED Q1 6/5/2018 COLONOSCOPY 4/6/2022 DTaP/Tdap/Td series (3 - Td) 4/20/2025 Allergies as of 10/12/2017  Review Complete On: 10/12/2017 By: Tejinder Daley MD  
  
 Severity Noted Reaction Type Reactions Statins-hmg-coa Reductase Inhibitors Medium 04/20/2015    Myalgia Current Immunizations  Reviewed on 4/20/2015 Name Date Influenza Vaccine 9/8/2016 10:01 AM, 11/23/2015 10:01 AM, 9/29/2014, 10/16/2013, 10/18/2012 Influenza Vaccine (Quad) PF 9/6/2017 10:16 AM  
 Pneumococcal Conjugate (PCV-13) 11/17/2016 Pneumococcal Polysaccharide (PPSV-23) 8/11/2009 TB Skin Test (PPD) Intradermal 2/19/2013 Tdap 4/20/2015 10:12 AM, 2/19/2009 Zoster Vaccine, Live 10/17/2016 Not reviewed this visit You Were Diagnosed With   
  
 Codes Comments Excessive gas    -  Primary ICD-10-CM: R14.3 ICD-9-CM: 787.3 History of gastritis     ICD-10-CM: Z87.19 ICD-9-CM: V12.79 Vitals BP Pulse Temp Resp Height(growth percentile) Weight(growth percentile) 140/76 (BP 1 Location: Left arm, BP Patient Position: Sitting) 76 98.7 °F (37.1 °C) (Oral) 18 5' 9\" (1.753 m) 193 lb 6.4 oz (87.7 kg) SpO2 BMI Smoking Status 96% 28.56 kg/m2 Former Smoker Vitals History BMI and BSA Data Body Mass Index Body Surface Area 28.56 kg/m 2 2.07 m 2 Preferred Pharmacy Pharmacy Name Phone [facility] [address] [facility] [place] [phone] Your Updated Medication List  
  
   
This list is accurate as of: 10/12/17  2:47 PM.  Always use your most recent med list.  
  
  
  
  
 ACCU-CHEK SMARTVIEW CONTRL SOL Soln Generic drug:  Blood Glucose Control, Normal  
USE AS DIRECTED ACCU-CHEK SMARTVIEW TEST STRIP strip Generic drug:  glucose blood VI test strips CHECK BLOOD SUGAR TWICE DAILY  
  
 acetaminophen 500 mg tablet Commonly known as:  TYLENOL  
500 mg as needed. aspirin 81 mg tablet Take 1 Tab by mouth daily. Blood-Glucose Meter Misc Commonly known as:  ACCU-CHEK IDANIA  
1 Each by Does Not Apply route daily. fluticasone 50 mcg/actuation nasal spray Commonly known as:  Michaelyn Drought 2 Sprays by Both Nostrils route daily. leflunomide 20 mg tablet Commonly known as:  Gerline Medico Take 1 Tab by mouth daily. losartan-hydroCHLOROthiazide 50-12.5 mg per tablet Commonly known as:  HYZAAR  
TAKE 1 TABLET EVERY DAY  
  
 metFORMIN 1,000 mg tablet Commonly known as:  GLUCOPHAGE  
TAKE 1 TABLET TWICE DAILY WITH MEALS  
  
 omeprazole 40 mg capsule Commonly known as:  PRILOSEC Take 1 Cap by mouth daily. red yeast rice extract 600 mg Cap Take 1,800 mg by mouth daily. traMADol 50 mg tablet Commonly known as:  ULTRAM  
TAKE 1 TABLET EVERY 8 HOURS AS NEEDED FOR PAIN  ( MAXIMUM DAILY AMOUNT:  150MG  ) Le Zaragoza Commonly known as:  ULTRA-LIGHT ROLLATOR Rollator Walker. Use as directed. Patient Instructions Continue omeprazole Avoid carbonated beverages, citrus, dairy, caffeine, tomato and NSAIDs Try an OTC simethicone agent for gas such as Phazyme, Gas-Ex, Beano Try an OTC probiotic-would recommend Culturelle Follow up with dr. Jesse Young for new symptoms, worsening symptoms or failure to improve. Introducing [facility]! Violet Rubi introduces [employer] patient portal. Now you can access parts of your medical record, email your doctor's office, and request medication refills online. 1. In your internet browser, go to [email or web address] 2. Click on the First Time User? Click Here link in the Sign In box. You will see the New Member Sign Up page. 3. Enter your [employer] Access Code exactly as it appears below. You will not need to use this code after youve completed the sign-up process. If you do not sign up before the expiration date, you must request a new code. · [employer] Access Code: [email or web address] Expires: 12/5/2017 10:04 AM 
 
4. Enter the last four digits of your Social Security Number (xxxx) and Date of Birth (mm/dd/yyyy) as indicated and click Submit. You will be taken to the next sign-up page. 5. Create a [employer] ID. This will be your [employer] login ID and cannot be changed, so think of one that is secure and easy to remember. 6. Create a [employer] password. You can change your password at any time. 7. Enter your Password Reset Question and Answer. This can be used at a later time if you forget your password. 8. Enter your e-mail address. You will receive e-mail notification when new information is available in [address] 9. Click Sign Up. You can now view and download portions of your medical record. 10. Click the Download Summary menu link to download a portable copy of your medical information. If you have questions, please visit the Frequently Asked Questions section of the [employer] website. Remember, [employer] is NOT to be used for urgent needs. For medical emergencies, dial 911. Now available from your iPhone and Android! Please provide this summary of care documentation to your next provider. Your primary care clinician is listed as Galindo Hess. If you have any questions after today's visit, please call [phone].

## 2017-10-12 NOTE — PATIENT INSTRUCTIONS
Continue omeprazole  Avoid carbonated beverages, citrus, dairy, caffeine, tomato and NSAIDs  Try an OTC simethicone agent for gas such as Phazyme, Gas-Ex, Beano  Try an OTC probiotic-would recommend Culturelle  Follow up with Dr. Jocelyne Pelaez for new symptoms, worsening symptoms or failure to improve.

## 2017-11-04 RX ORDER — METFORMIN HYDROCHLORIDE 1000 MG/1
TABLET ORAL
Qty: 180 TAB | Refills: 1 | Status: SHIPPED | OUTPATIENT
Start: 2017-11-04 | End: 2018-03-21 | Stop reason: SDUPTHER

## 2017-11-10 ENCOUNTER — OFFICE VISIT (OUTPATIENT)
Dept: FAMILY MEDICINE CLINIC | Age: 62
End: 2017-11-10

## 2017-11-10 VITALS
RESPIRATION RATE: 82 BRPM | BODY MASS INDEX: 28.79 KG/M2 | TEMPERATURE: 97.7 F | HEIGHT: 69 IN | HEART RATE: 82 BPM | DIASTOLIC BLOOD PRESSURE: 88 MMHG | OXYGEN SATURATION: 94 % | SYSTOLIC BLOOD PRESSURE: 140 MMHG | WEIGHT: 194.4 LBS

## 2017-11-10 DIAGNOSIS — R79.89 ELEVATED TSH: ICD-10-CM

## 2017-11-10 DIAGNOSIS — E04.1 RIGHT THYROID NODULE: ICD-10-CM

## 2017-11-10 DIAGNOSIS — E55.9 VITAMIN D DEFICIENCY: Chronic | ICD-10-CM

## 2017-11-10 DIAGNOSIS — M25.50 ARTHRALGIA, UNSPECIFIED JOINT: ICD-10-CM

## 2017-11-10 DIAGNOSIS — R11.0 NAUSEA: ICD-10-CM

## 2017-11-10 DIAGNOSIS — K20.90 ESOPHAGITIS: Chronic | ICD-10-CM

## 2017-11-10 DIAGNOSIS — Z78.9 STATIN INTOLERANCE: ICD-10-CM

## 2017-11-10 DIAGNOSIS — R53.81 MALAISE: Primary | ICD-10-CM

## 2017-11-10 DIAGNOSIS — M06.9 RHEUMATOID ARTHRITIS, INVOLVING UNSPECIFIED SITE, UNSPECIFIED RHEUMATOID FACTOR PRESENCE: Chronic | ICD-10-CM

## 2017-11-10 DIAGNOSIS — E11.9 DIABETES MELLITUS TYPE 2, NONINSULIN DEPENDENT (HCC): Chronic | ICD-10-CM

## 2017-11-10 DIAGNOSIS — E78.5 HYPERLIPIDEMIA, UNSPECIFIED HYPERLIPIDEMIA TYPE: ICD-10-CM

## 2017-11-10 DIAGNOSIS — E78.5 HYPERLIPIDEMIA WITH TARGET LDL LESS THAN 100: Chronic | ICD-10-CM

## 2017-11-10 DIAGNOSIS — R10.13 EPIGASTRIC PAIN: ICD-10-CM

## 2017-11-10 DIAGNOSIS — D58.2 HEMOGLOBIN C TRAIT (HCC): Chronic | ICD-10-CM

## 2017-11-10 DIAGNOSIS — K29.50 CHRONIC GASTRITIS WITHOUT BLEEDING, UNSPECIFIED GASTRITIS TYPE: Chronic | ICD-10-CM

## 2017-11-10 DIAGNOSIS — R53.81 MALAISE: ICD-10-CM

## 2017-11-10 DIAGNOSIS — I10 ESSENTIAL HYPERTENSION: Chronic | ICD-10-CM

## 2017-11-10 LAB
ALBUMIN UR QL STRIP: 80 MG/L
CREATININE, URINE POC: 300 MG/DL
HBA1C MFR BLD HPLC: 6.5 %
MICROALBUMIN/CREAT RATIO POC: <30 MG/G

## 2017-11-10 NOTE — PROGRESS NOTES
[facility]  Primary Care Office Visit - Problem-Oriented    : 1955   Amelia Agarwal is a 57 y.o. male presenting for  Chief Complaint   Patient presents with    Diabetes    Hypertension       Assessment/Plan:       ICD-10-CM   1. Malaise - initial encounter, x 3-4 months with  R53.81   2. Epigastric pain - initial encounter, nearly daily, assoc with R10.13   3. Nausea - initial encounter  - check labs  - hx of chronic esophagitis by EGD Mar 2013 with DLDS, compliant with PPI  - if labs unremarkable, likely will refer to GI     R11.0   4. Diabetes mellitus type 2, noninsulin dependent ([facility] - well controlled, POC A1c 6.5  - continue metformin 100mg BID  - on ARB, ASA  - Lipid lowering therapy not prescibed for medical reasons. E11.9   5. Rheumatoid arthritis, involving unspecified site, unspecified rheumatoid factor presence ([facility]  - followed by rheumatology     M06.9   6. Essential hypertension - well controlled     I10   7. Hyperlipidemia with target LDL less than 100 - unclear control E78.5   8. Hyperlipidemia, unspecified hyperlipidemia type  E78.5   9. Statin intolerance  - thus, not on statin  - will reassess  - encourage lifestyle changes     Z78.9   10. Arthralgia, unspecified joint     M25.50   11. Elevated TSH - unclear control R94.6   12. Right thyroid nodule  - check labs     E04.1   13.  Vitamin D deficiency - unclear control  - check labs     E55.9   14. Hemoglobin C trait (HCC)  - CBC checked regularly for anemia D58.2         Orders Placed This Encounter    LIPID PANEL     Standing Status:   Future     Number of Occurrences:   1     Standing Expiration Date:   2018    CBC WITH AUTOMATED DIFF     Standing Status:   Future     Number of Occurrences:   1     Standing Expiration Date:       METABOLIC PANEL, COMPREHENSIVE     Standing Status:   Future     Number of Occurrences:   1     Standing Expiration Date:   2018    GGT     Standing Status:   Future     Number of Occurrences:   1     Standing Expiration Date:   11/11/2018    AMYLASE     Standing Status:   Future     Number of Occurrences:   1     Standing Expiration Date:   11/11/2018    LIPASE     Standing Status:   Future     Number of Occurrences:   1     Standing Expiration Date:   11/11/2018    HEMOGLOBIN A1C WITH EAG     Standing Status:   Future     Number of Occurrences:   1     Standing Expiration Date:   11/11/2018    VITAMIN D, 25 HYDROXY     Standing Status:   Future     Number of Occurrences:   1     Standing Expiration Date:   11/11/2018    IRON PROFILE     Standing Status:   Future     Number of Occurrences:   1     Standing Expiration Date:   11/11/2018    VITAMIN B12 & FOLATE     Standing Status:   Future     Number of Occurrences:   1     Standing Expiration Date:   11/11/2018    FERRITIN     Standing Status:   Future     Number of Occurrences:   1     Standing Expiration Date:   11/11/2018    C REACTIVE PROTEIN, QT     Standing Status:   Future     Number of Occurrences:   1     Standing Expiration Date:   11/11/2018    SED RATE (ESR)     Standing Status:   Future     Number of Occurrences:   1     Standing Expiration Date:   11/11/2018    RHEUMATOID FACTOR, IGM     Standing Status:   Future     Number of Occurrences:   1     Standing Expiration Date:   11/11/2018    T4, FREE     Standing Status:   Future     Number of Occurrences:   1     Standing Expiration Date:   11/11/2018    TSH 3RD GENERATION     Standing Status:   Future     Number of Occurrences:   1     Standing Expiration Date:   11/11/2018    URIC ACID     Standing Status:   Future     Number of Occurrences:   1     Standing Expiration Date:   11/11/2018    AMB POC URINE, MICROALBUMIN, SEMIQUANT (3 RESULTS)    AMB POC HEMOGLOBIN A1C    HM DIABETES FOOT EXAM       Spent 25min face-to-face, >50% spent on counseling & patient education.     CC:  Cameron Mendoza MD (Rheumatology)        This document may have been created with the aid of dictation software. Text may contain errors, particularly phonetic errors. Reviewed management plan & instructions with patient, who voiced understanding. Breanna Stone MD  Internal Medicine, Family Medicine & Sports Medicine  11/10/2017, 10:25 AM    Patient Instructions (provided in AVS): To Do:  · return to the office at your convenience for FASTING (nothing to eat/drink 8-10 hours before, except water) bloodwork; lab opens @ 7am M-F    Notes from your doctor:  · If I can't find an answer to your \"not feeling well\" (general fatigue, nausea), I may have you go back to see your gastroenterologist because this has been going on for quite some time now. History:   Andrew Mix is a 57 y.o. male presenting to address:  Chief Complaint   Patient presents with    Diabetes    Hypertension       Intermittent sinus pain, not particularly troublesome. + nearly daily episodes of nausea, epigastric pain, but it all comes and goes. OTC gas medications seem to help some. However bothers him enough that he hasn't been to the gym x 2 months, but stays moving. Is compliant with medications, and denies any AE. Past Medical History:   Diagnosis Date    Diabetes ([facility]     H. pylori infection     s/p prevpack    H/O esophagogastroduodenoscopy 3/1/2013    irregular z-line, small 1cm submucosal nodule    Hypercholesterolemia     Hypertension     Rheumatoid arthritis(714.0)      Past Surgical History:   Procedure Laterality Date    ENDOSCOPY, COLON, DIAGNOSTIC      HX HERNIA REPAIR      HX WISDOM TEETH EXTRACTION        reports that he has quit smoking. He has never used smokeless tobacco. He reports that he does not drink alcohol or use illicit drugs.   Social History     Social History Narrative     History   Smoking Status    Former Smoker   Smokeless Tobacco    Never Used     Family History   Problem Relation Age of Onset    Heart Disease Mother     Heart Disease Father     Arthritis-rheumatoid Sister     Hypertension Sister     Heart Disease Sister     Heart Disease Brother      Allergies   Allergen Reactions    Statins-Hmg-Coa Reductase Inhibitors Myalgia       Problem List:      Patient Active Problem List    Diagnosis    Essential hypertension     - meds: losartan/HCTZ 50/12.5        Diabetes mellitus type 2, noninsulin dependent ([facility] [facility]     - meds: metformin 1000mg BID    - A1c 7.5 (Nov 2015)  - A1c 6.5 (4/14/2015)    - foot exam: 7/8/2014    Intolerant of statins.  Rheumatoid arthritis ([facility] [facility]     -1/16/2017 [Dr. Rosario Dear: seropositive long-standing disease on arava monotherapy, reporting RAPID3 16.3; sicca syndrome; r/o Sjogrens, labs, possibly US to eval disease activity    -4/26/2016: [Rheum] refilled leflunomide 20 mg daily, noted that he would be switching to a rheumatologist closer to home  -2/17/2016: continue on leflunomide, plaquenil, labs: cbc, ESR, CMP, CRP, B hand xrays;  follow up 2 mo    Previously followed by Dr. Navin Walsh (rheum)    -- 9/29/2014 [rheum]: cont humira, plaquenil & tramadol; stop prednisone; flu shot given  -- 7/29/2014 [rheum]: cont humira & plaquenil; reduce prednisone to 2.5mg daily  -- 4/29/2014 [rheum]: combo of RA & DJD; continue humira, plaquenil & prednisone 5mg daily; added tramadol qhs prn; avoid NSAIDs; recs: check neck US to rule out lymph node swelling (R side, above clavicle)  -- Mar 2013: RF = 588      Hyperlipidemia with target LDL less than 100     - statin intolerant    -- tot 258 (H), HDL 45, , LDLc 186 (H) (1/10/2015); 10yr ASCVD risk 22.5%  -- tot 146, , HDL 51, LDLc 72 (7/8/2014)  -- Sept 2013: tot 139, LDL 76, HDL 42,       Chronic gastritis    Esophagitis     Chronic esophgitis by bx with irregular z-line (Mar 2013) by Dr. Cruz Armando (DLDS).  DDD (degenerative disc disease), lumbosacral     L-spine XR (Dec 2016):   Mild L4-S1 degenerative endplate reactive changes and disc space height loss. Moderate bilateral facet arthropathy L4-S1. Bilateral oblique views demonstrate moderate right and mild left L5-S1 foraminal stenoses.  Statin intolerance    Advance care planning    Obesity    Right thyroid nodule     Followed by Dr. Diomedes Macias (endo). -- 6/10/2014 [endo]: FNA Bx negative for malignancy      Vitamin D deficiency     - VitD 34.4 (7/8/2014)    *5/7/2014: VitD 14.6 (L)  - start 50k 2x/wk      Elevated TSH    Decreased libido     -- 3/4/2014: slightly low free testosterone, normal PSA, but slightly elevated TSH      TERRY (obstructive sleep apnea)     Followed by Dr. Jaquez Apo DR [facility])  -- 12/17/2013: overnight sleep study pending        Myalgia     *10/9/2014: improved off of januvia  *5/7/2014: sx unchanged off crestor    -- 3/4/2014: continue to hold crestor; advised massage & stretching  --  (2/21/2014); holding crestor until next appt  -- aldolase wnl (2/21/2014)      Routine health maintenance     -- c-scope: 4/6/2012 by Enoch Braswell ([address]), hyperplastic polyps; next due in 10 yrs (Apr 2022)      Neural foraminal stenosis of cervical spine     C-spine MRI (Apr 2013):  - focal R paracentral disc protrusion @ C3-4 with mild cord impingement  - mild to mod foraminal stenoses, primarily @ C3-4 and C4-5 on right  - no C1-2 rheumatoid instability      Hemoglobin C trait ([facility]     Seen on Hgb electrophoresis on 5/29/2013. Medications:     Current Outpatient Prescriptions   Medication Sig    metFORMIN (GLUCOPHAGE) 1,000 mg tablet TAKE 1 TABLET TWICE DAILY WITH MEALS    Blood-Glucose Meter (ACCU-CHEK IDANIA) misc 1 Each by Does Not Apply route daily.  losartan-hydroCHLOROthiazide (HYZAAR) 50-12.5 mg per tablet TAKE 1 TABLET EVERY DAY    fluticasone (FLONASE) 50 mcg/actuation nasal spray 2 Sprays by Both Nostrils route daily.     omeprazole (PRILOSEC) 40 mg capsule Take 1 Cap by mouth daily.    ACCU-CHEK SMARTVIEW TEST STRIP strip CHECK BLOOD SUGAR TWICE DAILY    traMADol (ULTRAM) 50 mg tablet TAKE 1 TABLET EVERY 8 HOURS AS NEEDED FOR PAIN  ( MAXIMUM DAILY AMOUNT:  150MG  )    leflunomide (ARAVA) 20 mg tablet Take 1 Tab by mouth daily.  ACCU-CHEK SMARTVIEW CONTRL SOL soln USE AS DIRECTED    acetaminophen (TYLENOL) 500 mg tablet 500 mg as needed.  red yeast rice extract 600 mg cap Take 1,800 mg by mouth daily.  aspirin 81 mg tablet Take 1 Tab by mouth daily.  Walker (ULTRA-LIGHT ROLLATOR) Misc Rollator Walker. Use as directed. No current facility-administered medications for this visit. Review of Systems:     Review of Systems   Constitutional: Positive for malaise/fatigue. Gastrointestinal: Positive for abdominal pain, nausea and vomiting. Negative for blood in stool, constipation, diarrhea, heartburn and melena. Genitourinary: Negative for dysuria. Musculoskeletal: Positive for joint pain and myalgias. Physical Assessment:   VS:    Vitals:    11/10/17 1010   BP: (!) 153/93   Pulse: 82   Resp: (!) 82   Temp: 97.7 °F (36.5 °C)   TempSrc: Oral   SpO2: 94%   Weight: 194 lb 6.4 oz (88.2 kg)   Height: 5' 9\" (1.753 m)   PainSc:   4   PainLoc: Face       Physical Exam   Constitutional: He is oriented to person, place, and time. He appears well-developed and well-nourished. No distress. HENT:   Head: Normocephalic and atraumatic. Right Ear: External ear normal.   Left Ear: External ear normal.   Mouth/Throat: Oropharynx is clear and moist.   Eyes: EOM are normal. Pupils are equal, round, and reactive to light. Neck: Normal range of motion. Neck supple. Cardiovascular: Normal rate, regular rhythm and normal heart sounds. No murmur heard. Pulmonary/Chest: Effort normal and breath sounds normal. No respiratory distress. He has no wheezes. Abdominal: Soft. Bowel sounds are normal. There is tenderness (mild) in the epigastric area.  There is no rigidity, no rebound and no guarding. Musculoskeletal: Normal range of motion. Neurological: He is alert and oriented to person, place, and time. No cranial nerve deficit. Skin: Skin is warm and dry. He is not diaphoretic. Psychiatric: He has a normal mood and affect. His behavior is normal. Judgment and thought content normal.   Nursing note reviewed.              Diabetic foot exam performed by Aye Simpson MD     Measurement  Response   Monofilament  R - 6/6  L - 6/6   Pulse DP R - present  L - present   Pulse TP R - present  L - present   Structural deformity R - None  L - None   Skin Integrity / Deformity R - None  L - None            Recent Labs & Imaging:     Recent Results (from the past 12 hour(s))   AMB POC URINE, MICROALBUMIN, SEMIQUANT (3 RESULTS)    Collection Time: 11/10/17 10:24 AM   Result Value Ref Range    ALBUMIN, URINE POC 80 Negative mg/L    CREATININE, URINE  mg/dL    Microalbumin/creat ratio (POC) <30 MG/G   AMB POC HEMOGLOBIN A1C    Collection Time: 11/10/17 10:24 AM   Result Value Ref Range    Hemoglobin A1c (POC) 6.5 %

## 2017-11-10 NOTE — PATIENT INSTRUCTIONS
To Do:  · return to the office at your convenience for FASTING (nothing to eat/drink 8-10 hours before, except water) bloodwork; lab opens @ 7am M-F    Notes from your doctor:  · If I can't find an answer to your \"not feeling well\" (general fatigue, nausea), I may have you go back to see your gastroenterologist because this has been going on for quite some time now.

## 2017-11-10 NOTE — MR AVS SNAPSHOT
Visit Information Date & Time Provider Department Dept. Phone Encounter #  
 11/10/2017 10:00 AM Jeremiah Gao, [employer] [phone] [id number] Follow-up Instructions Return in about 3 months (around 2/10/2018) for 20min follow-up. Upcoming Health Maintenance Date Due  
 LIPID PANEL Q1 4/17/2018 HEMOGLOBIN A1C Q6M 5/10/2018 EYE EXAM RETINAL OR DILATED Q1 6/5/2018 FOOT EXAM Q1 11/10/2018 MICROALBUMIN Q1 11/10/2018 COLONOSCOPY 4/6/2022 DTaP/Tdap/Td series (3 - Td) 4/20/2025 Allergies as of 11/10/2017  Review Complete On: 11/10/2017 By: Jeremiah Gao MD  
  
 Severity Noted Reaction Type Reactions Statins-hmg-coa Reductase Inhibitors Medium 04/20/2015    Myalgia Current Immunizations  Reviewed on 4/20/2015 Name Date Influenza Vaccine 9/8/2016 10:01 AM, 11/23/2015 10:01 AM, 9/29/2014, 10/16/2013, 10/18/2012 Influenza Vaccine (Quad) PF 9/6/2017 10:16 AM  
 Pneumococcal Conjugate (PCV-13) 11/17/2016 Pneumococcal Polysaccharide (PPSV-23) 8/11/2009 TB Skin Test (PPD) Intradermal 2/19/2013 Tdap 4/20/2015 10:12 AM, 2/19/2009 Zoster Vaccine, Live 10/17/2016 Not reviewed this visit You Were Diagnosed With   
  
 Codes Comments Diabetes mellitus type 2, noninsulin dependent ([facility]    -  Primary ICD-10-CM: E11.9 ICD-9-CM: 250.00 Malaise     ICD-10-CM: R53.81 ICD-9-CM: 780.79 Epigastric pain     ICD-10-CM: R10.13 ICD-9-CM: 789.06 Nausea     ICD-10-CM: R11.0 ICD-9-CM: 787.02 Hemoglobin C trait ([facility]     ICD-10-CM: Z02.7 ICD-9-CM: 321. 7 Rheumatoid arthritis, involving unspecified site, unspecified rheumatoid factor presence ([facility]     ICD-10-CM: M06.9 ICD-9-CM: 714.0 Essential hypertension     ICD-10-CM: I10 
ICD-9-CM: 401.9 Vitamin D deficiency     ICD-10-CM: E55.9 ICD-9-CM: 268.9 Hyperlipidemia with target LDL less than 100     ICD-10-CM: E78.5 ICD-9-CM: 272.4 Statin intolerance     ICD-10-CM: Z78.9 ICD-9-CM: 995.27 Arthralgia, unspecified joint     ICD-10-CM: M25.50 ICD-9-CM: 719.40 Elevated TSH     ICD-10-CM: R94.6 ICD-9-CM: 794.5 Right thyroid nodule     ICD-10-CM: E04.1 ICD-9-CM: 241.0 Hyperlipidemia, unspecified hyperlipidemia type     ICD-10-CM: E78.5 ICD-9-CM: 272.4 Vitals BP Pulse Temp Resp Height(growth percentile) Weight(growth percentile) 140/88 (BP 1 Location: Right arm, BP Patient Position: Sitting) 82 97.7 °F (36.5 °C) (Oral) (!) 82 5' 9\" (1.753 m) 194 lb 6.4 oz (88.2 kg) SpO2 BMI Smoking Status 94% 28.71 kg/m2 Former Smoker Vitals History BMI and BSA Data Body Mass Index Body Surface Area 28.71 kg/m 2 2.07 m 2 Preferred Pharmacy Pharmacy Name Phone [address] [facility] [address] [place] [address] [phone] Your Updated Medication List  
  
   
This list is accurate as of: 11/10/17 10:50 AM.  Always use your most recent med list.  
  
  
  
  
 ACCU-CHEK SMARTVIEW CONTRL SOL Soln Generic drug:  Blood Glucose Control, Normal  
USE AS DIRECTED ACCU-CHEK SMARTVIEW TEST STRIP strip Generic drug:  glucose blood VI test strips CHECK BLOOD SUGAR TWICE DAILY  
  
 acetaminophen 500 mg tablet Commonly known as:  TYLENOL  
500 mg as needed. aspirin 81 mg tablet Take 1 Tab by mouth daily. Blood-Glucose Meter Misc Commonly known as:  ACCU-CHEK IDANIA  
1 Each by Does Not Apply route daily. fluticasone 50 mcg/actuation nasal spray Commonly known as:  Bailey Blizzard 2 Sprays by Both Nostrils route daily. leflunomide 20 mg tablet Commonly known as:  Lexus Prier Take 1 Tab by mouth daily. losartan-hydroCHLOROthiazide 50-12.5 mg per tablet Commonly known as:  HYZAAR  
TAKE 1 TABLET EVERY DAY  
  
 metFORMIN 1,000 mg tablet Commonly known as:  GLUCOPHAGE  
TAKE 1 TABLET TWICE DAILY WITH MEALS  
  
 omeprazole 40 mg capsule Commonly known as:  PRILOSEC Take 1 Cap by mouth daily. red yeast rice extract 600 mg Cap Take 1,800 mg by mouth daily. traMADol 50 mg tablet Commonly known as:  ULTRAM  
TAKE 1 TABLET EVERY 8 HOURS AS NEEDED FOR PAIN  ( MAXIMUM DAILY AMOUNT:  150MG  ) We Performed the Following AMB POC HEMOGLOBIN A1C [99576 CPT(R)] AMB POC URINE, MICROALBUMIN, SEMIQUANT (3 RESULTS) [80138 CPT(R)]  DIABETES FOOT EXAM [7 Custom] Follow-up Instructions Return in about 3 months (around 2/10/2018) for 20min follow-up. To-Do List   
 11/10/2017 Lab:  AMYLASE   
  
 11/10/2017 Lab:  C REACTIVE PROTEIN, QT   
  
 11/10/2017 Lab:  CBC WITH AUTOMATED DIFF   
  
 11/10/2017 Lab:  FERRITIN   
  
 11/10/2017 Lab:  GGT   
  
 11/10/2017 Lab:  HEMOGLOBIN A1C WITH EAG   
  
 11/10/2017 Lab:  IRON PROFILE   
  
 11/10/2017 Lab:  LIPASE   
  
 11/10/2017 Lab:  LIPID PANEL   
  
 11/10/2017 Lab:  METABOLIC PANEL, COMPREHENSIVE   
  
 11/10/2017 Lab:  RHEUMATOID FACTOR, IGM   
  
 11/10/2017 Lab:  SED RATE (ESR)   
  
 11/10/2017 Lab:  T4, FREE   
  
 11/10/2017 Lab:  TSH 3RD GENERATION   
  
 11/10/2017 Lab:  URIC ACID   
  
 11/10/2017 Lab:  VITAMIN B12 & FOLATE   
  
 11/10/2017 Lab:  VITAMIN D, 25 HYDROXY Patient Instructions To Do: 
· return to the office at your convenience for FASTING (nothing to eat/drink 8-10 hours before, except water) bloodwork; lab opens @ 7am M-F Notes from your doctor: · If I can't find an answer to your \"not feeling well\" (general fatigue, nausea), I may have you go back to see your gastroenterologist because this has been going on for quite some time now. Introducing [facility]!    
 Lisa Escalona introduces [employer] patient portal. Now you can access parts of your medical record, email your doctor's office, and request medication refills online. 1. In your internet browser, go to [email or web address] 2. Click on the First Time User? Click Here link in the Sign In box. You will see the New Member Sign Up page. 3. Enter your [employer] Access Code exactly as it appears below. You will not need to use this code after youve completed the sign-up process. If you do not sign up before the expiration date, you must request a new code. · [employer] Access Code: [email or web address] Expires: 12/5/2017  9:04 AM 
 
4. Enter the last four digits of your Social Security Number (xxxx) and Date of Birth (mm/dd/yyyy) as indicated and click Submit. You will be taken to the next sign-up page. 5. Create a [employer] ID. This will be your [employer] login ID and cannot be changed, so think of one that is secure and easy to remember. 6. Create a [employer] password. You can change your password at any time. 7. Enter your Password Reset Question and Answer. This can be used at a later time if you forget your password. 8. Enter your e-mail address. You will receive e-mail notification when new information is available in [address] 9. Click Sign Up. You can now view and download portions of your medical record. 10. Click the Download Summary menu link to download a portable copy of your medical information. If you have questions, please visit the Frequently Asked Questions section of the [employer] website. Remember, [employer] is NOT to be used for urgent needs. For medical emergencies, dial 911. Now available from your iPhone and Android! Please provide this summary of care documentation to your next provider. Your primary care clinician is listed as Steven Santiago. If you have any questions after today's visit, please call [phone].

## 2017-11-13 ENCOUNTER — HOSPITAL ENCOUNTER (OUTPATIENT)
Dept: LAB | Age: 62
Discharge: HOME | End: 2017-11-13

## 2017-11-13 PROCEDURE — 99001 SPECIMEN HANDLING PT-LAB: CPT | Performed by: FAMILY MEDICINE

## 2017-11-29 ENCOUNTER — TELEPHONE (OUTPATIENT)
Dept: FAMILY MEDICINE CLINIC | Age: 62
End: 2017-11-29

## 2017-11-29 DIAGNOSIS — E55.9 VITAMIN D DEFICIENCY: ICD-10-CM

## 2017-11-29 LAB
25(OH)D3+25(OH)D2 SERPL-MCNC: 17.2 NG/ML (ref 30–100)
ALBUMIN SERPL-MCNC: 4.5 G/DL (ref 3.6–4.8)
ALBUMIN/GLOB SERPL: 1.4 {RATIO} (ref 1.2–2.2)
ALP SERPL-CCNC: 93 IU/L (ref 39–117)
ALT SERPL-CCNC: 21 IU/L (ref 0–44)
AMYLASE SERPL-CCNC: 47 U/L (ref 31–124)
AST SERPL-CCNC: 16 IU/L (ref 0–40)
BASOPHILS # BLD AUTO: 0 X10E3/UL (ref 0–0.2)
BASOPHILS NFR BLD AUTO: 1 %
BILIRUB SERPL-MCNC: 0.5 MG/DL (ref 0–1.2)
BUN SERPL-MCNC: 12 MG/DL (ref 8–27)
BUN/CREAT SERPL: 14 (ref 10–24)
CALCIUM SERPL-MCNC: 10 MG/DL (ref 8.6–10.2)
CHLORIDE SERPL-SCNC: 95 MMOL/L (ref 96–106)
CHOLEST SERPL-MCNC: 277 MG/DL (ref 100–199)
CO2 SERPL-SCNC: 29 MMOL/L (ref 18–29)
CREAT SERPL-MCNC: 0.83 MG/DL (ref 0.76–1.27)
CRP SERPL-MCNC: 1.2 MG/L (ref 0–4.9)
EOSINOPHIL # BLD AUTO: 0.3 X10E3/UL (ref 0–0.4)
EOSINOPHIL NFR BLD AUTO: 5 %
ERYTHROCYTE [DISTWIDTH] IN BLOOD BY AUTOMATED COUNT: 15.8 % (ref 12.3–15.4)
ERYTHROCYTE [SEDIMENTATION RATE] IN BLOOD BY WESTERGREN METHOD: 21 MM/HR (ref 0–30)
EST. AVERAGE GLUCOSE BLD GHB EST-MCNC: 137 MG/DL
FERRITIN SERPL-MCNC: 101 NG/ML (ref 30–400)
FOLATE SERPL-MCNC: 8 NG/ML
GFR SERPLBLD CREATININE-BSD FMLA CKD-EPI: 107 ML/MIN/1.73
GFR SERPLBLD CREATININE-BSD FMLA CKD-EPI: 93 ML/MIN/1.73
GGT SERPL-CCNC: 35 IU/L (ref 0–65)
GLOBULIN SER CALC-MCNC: 3.2 G/DL (ref 1.5–4.5)
GLUCOSE SERPL-MCNC: 122 MG/DL (ref 65–99)
HBA1C MFR BLD: 6.4 % (ref 4.8–5.6)
HCT VFR BLD AUTO: 41.7 % (ref 37.5–51)
HDLC SERPL-MCNC: 45 MG/DL
HGB BLD-MCNC: 14.4 G/DL (ref 12.6–17.7)
IMM GRANULOCYTES # BLD: 0 X10E3/UL (ref 0–0.1)
IMM GRANULOCYTES NFR BLD: 0 %
INTERPRETATION, 910389: NORMAL
IRON SATN MFR SERPL: 23 % (ref 15–55)
IRON SERPL-MCNC: 79 UG/DL (ref 38–169)
LDLC SERPL CALC-MCNC: 212 MG/DL (ref 0–99)
LIPASE SERPL-CCNC: 21 U/L (ref 13–78)
LYMPHOCYTES # BLD AUTO: 1.6 X10E3/UL (ref 0.7–3.1)
LYMPHOCYTES NFR BLD AUTO: 33 %
Lab: NORMAL
MCH RBC QN AUTO: 25.6 PG (ref 26.6–33)
MCHC RBC AUTO-ENTMCNC: 34.5 G/DL (ref 31.5–35.7)
MCV RBC AUTO: 74 FL (ref 79–97)
MONOCYTES # BLD AUTO: 0.7 X10E3/UL (ref 0.1–0.9)
MONOCYTES NFR BLD AUTO: 14 %
NEUTROPHILS # BLD AUTO: 2.3 X10E3/UL (ref 1.4–7)
NEUTROPHILS NFR BLD AUTO: 47 %
PLATELET # BLD AUTO: 253 X10E3/UL (ref 150–379)
POTASSIUM SERPL-SCNC: 4 MMOL/L (ref 3.5–5.2)
PROT SERPL-MCNC: 7.7 G/DL (ref 6–8.5)
RBC # BLD AUTO: 5.62 X10E6/UL (ref 4.14–5.8)
RHEUMATOID FACTOR LEVELS: >80 IU/ML (ref 0–15)
SODIUM SERPL-SCNC: 143 MMOL/L (ref 134–144)
T4 FREE SERPL-MCNC: 1.07 NG/DL (ref 0.82–1.77)
TIBC SERPL-MCNC: 344 UG/DL (ref 250–450)
TRIGL SERPL-MCNC: 101 MG/DL (ref 0–149)
TSH SERPL DL<=0.005 MIU/L-ACNC: 2.51 UIU/ML (ref 0.45–4.5)
UIBC SERPL-MCNC: 265 UG/DL (ref 111–343)
URATE SERPL-MCNC: 5.5 MG/DL (ref 3.7–8.6)
VIT B12 SERPL-MCNC: 315 PG/ML (ref 211–946)
VLDLC SERPL CALC-MCNC: 20 MG/DL (ref 5–40)
WBC # BLD AUTO: 4.8 X10E3/UL (ref 3.4–10.8)

## 2017-11-29 RX ORDER — ERGOCALCIFEROL 1.25 MG/1
50000 CAPSULE ORAL
Qty: 24 CAP | Refills: 3 | Status: SHIPPED | OUTPATIENT
Start: 2017-11-29 | End: 2019-03-01 | Stop reason: SDUPTHER

## 2017-11-29 NOTE — PROGRESS NOTES
Spoke with patient, verified with 2 identifiers. Advised of results and instructions. Patient verbalized understanding. No further concerns at this time.

## 2017-11-29 NOTE — PROGRESS NOTES
Please call [place], and inform him that his bloodwork shows that his vitamin D level is low once again. He should resume taking weekly high dose vitamin D, and I have already sent that to his [address]. I will go ahead and refer to GI as I don't see anything that would explain his nausea and feeling \"not good\". Also I will send a copy of these results to his rheumatologist as well. Thanks.

## 2017-11-29 NOTE — TELEPHONE ENCOUNTER
Spoke with patient, verified with 2 identifiers. Advised of below results and instructions. Patient verbalized understanding. No further concerns at this time.

## 2017-11-29 NOTE — TELEPHONE ENCOUNTER
----- Message from Ernestina Augustine MD sent at 11/29/2017  4:42 PM EST -----  Please call [place], and inform him that his bloodwork shows that his vitamin D level is low once again. He should resume taking weekly high dose vitamin D, and I have already sent that to his [address]. I will go ahead and refer to GI as I don't see anything that would explain his nausea and feeling \"not good\". Also I will send a copy of these results to his rheumatologist as well. Thanks.

## 2017-11-30 ENCOUNTER — TELEPHONE (OUTPATIENT)
Dept: FAMILY MEDICINE CLINIC | Age: 62
End: 2017-11-30

## 2017-11-30 NOTE — TELEPHONE ENCOUNTER
He can try over-the-counter zantac for now. However likely that will be inadequate with his history of esophagitis. We should still get him back to GI. (needs new referral, will order).

## 2017-11-30 NOTE — TELEPHONE ENCOUNTER
Patient called and stated he thinks he found out the cause for why he was feeling so bad. He states when he refilled his Prilosec he was reading over the side effects and they are all the same with how he has been feeling. Patient states he has stopped the Prilosec and is now feeling back to normal.    He would like to know if you give prescribe something else for the acid reflux.

## 2018-02-09 ENCOUNTER — OFFICE VISIT (OUTPATIENT)
Dept: FAMILY MEDICINE CLINIC | Age: 63
End: 2018-02-09

## 2018-02-09 ENCOUNTER — HOSPITAL ENCOUNTER (OUTPATIENT)
Dept: LAB | Age: 63
Discharge: HOME | End: 2018-02-09

## 2018-02-09 VITALS
TEMPERATURE: 96.2 F | OXYGEN SATURATION: 96 % | RESPIRATION RATE: 12 BRPM | DIASTOLIC BLOOD PRESSURE: 86 MMHG | HEART RATE: 80 BPM | HEIGHT: 69 IN | SYSTOLIC BLOOD PRESSURE: 132 MMHG | BODY MASS INDEX: 28.26 KG/M2 | WEIGHT: 190.8 LBS

## 2018-02-09 DIAGNOSIS — E11.9 DIABETES MELLITUS TYPE 2, NONINSULIN DEPENDENT (HCC): Chronic | ICD-10-CM

## 2018-02-09 DIAGNOSIS — Z13.39 SCREENING FOR ALCOHOLISM: ICD-10-CM

## 2018-02-09 DIAGNOSIS — E55.9 VITAMIN D DEFICIENCY: Chronic | ICD-10-CM

## 2018-02-09 DIAGNOSIS — Z12.5 SPECIAL SCREENING FOR MALIGNANT NEOPLASM OF PROSTATE: ICD-10-CM

## 2018-02-09 DIAGNOSIS — I10 ESSENTIAL HYPERTENSION: Chronic | ICD-10-CM

## 2018-02-09 DIAGNOSIS — M06.9 RHEUMATOID ARTHRITIS INVOLVING MULTIPLE SITES, UNSPECIFIED RHEUMATOID FACTOR PRESENCE: Chronic | ICD-10-CM

## 2018-02-09 DIAGNOSIS — D58.2 HEMOGLOBIN C TRAIT (HCC): Chronic | ICD-10-CM

## 2018-02-09 DIAGNOSIS — K20.90 ESOPHAGITIS: Chronic | ICD-10-CM

## 2018-02-09 DIAGNOSIS — Z78.9 STATIN INTOLERANCE: ICD-10-CM

## 2018-02-09 DIAGNOSIS — K29.50 CHRONIC GASTRITIS WITHOUT BLEEDING, UNSPECIFIED GASTRITIS TYPE: Chronic | ICD-10-CM

## 2018-02-09 DIAGNOSIS — Z00.00 MEDICARE ANNUAL WELLNESS VISIT, SUBSEQUENT: Primary | ICD-10-CM

## 2018-02-09 DIAGNOSIS — E78.5 HYPERLIPIDEMIA WITH TARGET LDL LESS THAN 100: Chronic | ICD-10-CM

## 2018-02-09 DIAGNOSIS — R79.89 ELEVATED TSH: ICD-10-CM

## 2018-02-09 DIAGNOSIS — Z13.31 SCREENING FOR DEPRESSION: ICD-10-CM

## 2018-02-09 DIAGNOSIS — M79.10 MYALGIA: Chronic | ICD-10-CM

## 2018-02-09 PROCEDURE — 99001 SPECIMEN HANDLING PT-LAB: CPT | Performed by: FAMILY MEDICINE

## 2018-02-09 NOTE — MR AVS SNAPSHOT
[address] [place] [address]  [address] [id number] [facility] [address] [phone] Patient: Hernandez Laser Sr. MRN: [id number] CHS:[id number]/7/[id number] Visit Information Date & Time Provider Department Dept. Phone Encounter #  
 2/9/2018  9:40 AM Jesús Tinoco MD [employer] [facility] [phone] [id number] Upcoming Health Maintenance Date Due HEMOGLOBIN A1C Q6M 5/13/2018 EYE EXAM RETINAL OR DILATED Q1 6/5/2018 FOOT EXAM Q1 11/10/2018 MICROALBUMIN Q1 11/10/2018 LIPID PANEL Q1 11/13/2018 COLONOSCOPY 4/6/2022 DTaP/Tdap/Td series (3 - Td) 4/20/2025 Allergies as of 2/9/2018  Review Complete On: 2/9/2018 By: Jesús Tinoco MD  
  
 Severity Noted Reaction Type Reactions Statins-hmg-coa Reductase Inhibitors Medium 04/20/2015    Myalgia Current Immunizations  Reviewed on 4/20/2015 Name Date Influenza Vaccine 9/8/2016 10:01 AM, 11/23/2015 10:01 AM, 9/29/2014, 10/16/2013, 10/18/2012 Influenza Vaccine (Quad) PF 9/6/2017 10:16 AM  
 Pneumococcal Conjugate (PCV-13) 11/17/2016 Pneumococcal Polysaccharide (PPSV-23) 8/11/2009 TB Skin Test (PPD) Intradermal 2/19/2013 Tdap 4/20/2015 10:12 AM, 2/19/2009 Zoster Vaccine, Live 10/17/2016 Not reviewed this visit You Were Diagnosed With   
  
 Codes Comments Medicare annual wellness visit, subsequent    -  Primary ICD-10-CM: Z00.00 ICD-9-CM: V70.0 Screening for alcoholism     ICD-10-CM: Z13.89 ICD-9-CM: V79.1 Screening for depression     ICD-10-CM: Z13.89 ICD-9-CM: V79.0 Diabetes mellitus type 2, noninsulin dependent ([facility]     ICD-10-CM: E11.9 ICD-9-CM: 250.00 Rheumatoid arthritis involving multiple sites, unspecified rheumatoid factor presence ([facility]     ICD-10-CM: M06.9 ICD-9-CM: 714.0 Hemoglobin C trait ([facility]     ICD-10-CM: R76.2 ICD-9-CM: 282.7 Essential hypertension     ICD-10-CM: I10 
ICD-9-CM: 401.9 Hyperlipidemia with target LDL less than 100     ICD-10-CM: E78.5 ICD-9-CM: 272.4 Vitamin D deficiency     ICD-10-CM: E55.9 ICD-9-CM: 268.9 Special screening for malignant neoplasm of prostate     ICD-10-CM: Z12.5 ICD-9-CM: V76.44 Myalgia     ICD-10-CM: M79.1 ICD-9-CM: 729.1 Elevated TSH     ICD-10-CM: R94.6 ICD-9-CM: 794.5 Statin intolerance     ICD-10-CM: Z78.9 ICD-9-CM: 995.27 Vitals BP Pulse Temp Resp Height(growth percentile) Weight(growth percentile) 132/86 (BP 1 Location: Right arm, BP Patient Position: Sitting) 80 96.2 °F (35.7 °C) (Oral) 12 5' 9\" (1.753 m) 190 lb 12.8 oz (86.5 kg) SpO2 BMI Smoking Status 96% 28.18 kg/m2 Former Smoker Vitals History BMI and BSA Data Body Mass Index Body Surface Area  
 28.18 kg/m 2 2.05 m 2 Preferred Pharmacy Pharmacy Name Phone [address] [address] [phone] Your Updated Medication List  
  
   
This list is accurate as of: 2/9/18 10:36 AM.  Always use your most recent med list.  
  
  
  
  
 ACCU-CHEK SMARTVIEW CONTRL SOL Soln Generic drug:  Blood Glucose Control, Normal  
USE AS DIRECTED ACCU-CHEK SMARTVIEW TEST STRIP strip Generic drug:  glucose blood VI test strips CHECK BLOOD SUGAR TWICE DAILY  
  
 acetaminophen 500 mg tablet Commonly known as:  TYLENOL  
500 mg as needed. aspirin 81 mg tablet Take 1 Tab by mouth daily. Blood-Glucose Meter Misc Commonly known as:  ACCU-CHEK IDANIA  
1 Each by Does Not Apply route daily. ergocalciferol 50,000 unit capsule Commonly known as:  ERGOCALCIFEROL Take 1 Cap by mouth every seven (7) days. fluticasone 50 mcg/actuation nasal spray Commonly known as:  Su Medin 2 Sprays by Both Nostrils route daily. leflunomide 20 mg tablet Commonly known as:  Hilario Sniff Take 1 Tab by mouth daily. losartan-hydroCHLOROthiazide 50-12.5 mg per tablet Commonly known as:  HYZAAR  
TAKE 1 TABLET EVERY DAY  
  
 metFORMIN 1,000 mg tablet Commonly known as:  GLUCOPHAGE  
TAKE 1 TABLET TWICE DAILY WITH MEALS  
  
 red yeast rice extract 600 mg Cap Take 1,800 mg by mouth daily. traMADol 50 mg tablet Commonly known as:  ULTRAM  
TAKE 1 TABLET EVERY 8 HOURS AS NEEDED FOR PAIN  ( MAXIMUM DAILY AMOUNT:  150MG  ) We Performed the Following [address] [[id number] [facility]] OR ANNUAL ALCOHOL SCREEN 15 MIN [id number] [facility]] To-Do List   
 02/09/2018 Lab:  CBC WITH AUTOMATED DIFF   
  
 02/09/2018 Lab:  CK   
  
 02/09/2018 Lab:  HEMOGLOBIN A1C WITH EAG   
  
 02/09/2018 Lab:  LIPID PANEL   
  
 02/09/2018 Lab:  METABOLIC PANEL, COMPREHENSIVE   
  
 02/09/2018 Lab:  PSA SCREENING (SCREENING)   
  
 02/09/2018 Lab:  TSH 3RD GENERATION   
  
 02/09/2018 Lab:  VITAMIN D, 25 HYDROXY Patient Instructions To Do: 
· labwork on your way out · You owe me a copy of your Advance Directive, by the way.  :) 
 
 
Notes from your doctor: · Keep up the great work Medicare Wellness Notes: 
· Included below is some information about Advance Directives. There is also an excellent website for it for the state  [place] ([email or web address]); if you end up making an Advance Directive, bring me a copy so I can put it in your medical record · We did your medicare wellness visit today. Below is your \"5 year preventive services plan\" Medicare Part B Preventive Services Limitations Recommendation Scheduled Bone Mass Measurement 
(age 72 & older, biennial) Requires diagnosis related to osteoporosis or estrogen deficiency. Biennial benefit unless patient has history of long-term glucocorticoid tx or baseline is needed because initial test was by other method Not applicable     
Cardiovascular Screening Blood Tests (every 5 years) Total cholesterol, HDL, Triglycerides Order as a panel if possible Up to date.     
Colorectal Cancer Screening 
-Fecal occult blood test (annual) -Flexible sigmoidoscopy (5y) 
-Screening colonoscopy (10y) -Barium Enema    Done Apr 2012     
Counseling to Prevent Tobacco Use (up to 8 sessions per year) - Counseling greater than 3 and up to 10 minutes - Counseling greater than 10 minutes Patients must be asymptomatic of tobacco-related conditions to receive as preventive service Not applicable     
Diabetes Screening Tests (at least every 3 years, Medicare covers annually or at 6-month intervals for prediabetic patients) 
   
Fasting blood sugar (FBS) or glucose tolerance test (GTT) Patient must be diagnosed with one of the following: 
-Hypertension, Dyslipidemia, obesity, previous impaired FBS or GTT 
Or any two of the following: overweight, FH of diabetes, age ? 72, history of gestational diabetes, birth of baby weighing more than 9 pounds Not applicable     
Diabetes Self-Management Training (DSMT) (no USPSTF recommendation) Requires referral by treating physician for patient with diabetes or renal disease. 10 hours of initial DSMT session of no less than 30 minutes each in a continuous 12-month period. 2 hours of follow-up DSMT in subsequent years. Skinny Otto Let me know if you are interested. Glaucoma Screening (no USPSTF recommendation) Diabetes mellitus, family history, , age 48 or over,  American, age 72 or over Done Apr 2015     
Human Immunodeficiency Virus (HIV) Screening (annually for increased risk patients) HIV-1 and HIV-2 by EIA, PEDRO, rapid antibody test, or oral mucosa transudate Patient must be at increased risk for HIV infection per USPSTF guidelines or pregnant. Tests covered annually for patients at increased risk. Pregnant patients may receive up to 3 test during pregnancy.  Not applicable     
Medical Nutrition Therapy (MNT) (for diabetes or renal disease not recommended schedule) Requires referral by treating physician for patient with diabetes or renal disease. Can be provided in same year as diabetes self-management training (DSMT), and CMS recommends medical nutrition therapy take place after DSMT. Up to 3 hours for initial year and 2 hours in subsequent years. Not applicable     
Prostate Cancer Screening (annually up to age 76) - Digital rectal exam (ERIC) - Prostate specific antigen (PSA) Annually (age 48 or over), ERIC not paid separately when covered E/M service is provided on same date Done March 2014. [employer] suggests every 2 years. Will do with next blood draw. Seasonal Influenza Vaccination (annually)    Done today     
Pneumococcal Vaccination (once after 72)           
Hepatitis B Vaccinations (if medium/high risk) Medium/high risk factors: End-stage renal disease, Hemophiliacs who received Factor VIII or IX concentrates, Clients of institutions for the mentally retarded, Persons who live in the same house as a HepB virus carrier, Homosexual men, Illicit injectable drug abusers. Not applicable     
Screening Mammography (biennial age 54-69)? Annually (age 36 or over) Not applicable     
Screening Pap Tests and Pelvic Examination (up to age 79 and after 79 if unknown history or abnormal study last 10 years) Every 25 months except high risk Not applicable     
Ultrasound Screening for Abdominal Aortic Aneurysm (AAA) (once) Patient must be referred through IPPE and not have had a screening for abdominal aortic aneurysm before under Medicare. Limited to patients who meet one of the following criteria: 
- Men who are 73-68 years old and have smoked more than 100 cigarettes in their lifetime. 
-Anyone with a FH of AAA 
-Anyone recommended for screening by USPSTF Not applicable     
Family Practice Management 2011 
  
 
 
 
  
Introducing [facility]!    
 Olimpia Chávez introduces [employer] patient portal. Now you can access parts of your medical record, email your doctor's office, and request medication refills online. 1. In your internet browser, go to [email or web address] 2. Click on the First Time User? Click Here link in the Sign In box. You will see the New Member Sign Up page. 3. Enter your Fundly Access Code exactly as it appears below. You will not need to use this code after youve completed the sign-up process. If you do not sign up before the expiration date, you must request a new code. · Fund[employer] Access Code: [email or web address] Expires: 5/10/2018 10:36 AM 
 
4. Enter the last four digits of your Social Security Number (xxxx) and Date of Birth (mm/dd/yyyy) as indicated and click Submit. You will be taken to the next sign-up page. 5. Create a Fundly ID. This will be your Fundly login ID and cannot be changed, so think of one that is secure and easy to remember. 6. Create a Fundly password. You can change your password at any time. 7. Enter your Password Reset Question and Answer. This can be used at a later time if you forget your password. 8. Enter your e-mail address. You will receive e-mail notification when new information is available in [address] 9. Click Sign Up. You can now view and download portions of your medical record. 10. Click the Download Summary menu link to download a portable copy of your medical information. If you have questions, please visit the Frequently Asked Questions section of the [employer] website. Remember, [employer] is NOT to be used for urgent needs. For medical emergencies, dial 911. Now available from your iPhone and Android! Please provide this summary of care documentation to your next provider. Your primary care clinician is listed as Madie Mott. If you have any questions after today's visit, please call [phone].

## 2018-02-09 NOTE — PROGRESS NOTES
Niyah Bergman is a 57 y.o. male (: 1955) presenting to address:    Chief Complaint   Patient presents with   Beth Elias Annual Wellness Visit            Vitals:    18 0955   BP: (!) 133/96   Pulse: 80   Resp: 12   Temp: 96.2 °F (35.7 °C)   TempSrc: Oral   SpO2: 96%   Weight: 190 lb 12.8 oz (86.5 kg)   Height: 5' 9\" (1.753 m)   PainSc:   5   PainLoc: Back       Hearing/Vision:      Hearing Screening    125Hz 250Hz 500Hz 1000Hz 2000Hz 3000Hz 4000Hz 6000Hz 8000Hz   Right ear:   40 40 40  40     Left ear:   40 40 40  40        Visual Acuity Screening    Right eye Left eye Both eyes   Without correction: 20/30 20/25 20/20   With correction:          Learning Assessment:     Learning Assessment 2014   PRIMARY LEARNER Patient   HIGHEST LEVEL OF EDUCATION - PRIMARY LEARNER  SOME COLLEGE   BARRIERS PRIMARY LEARNER NONE   CO-LEARNER CAREGIVER No   PRIMARY LANGUAGE ENGLISH   LEARNER PREFERENCE PRIMARY OTHER (COMMENT)   ANSWERED BY Patient   RELATIONSHIP SELF     Depression Screening:     PHQ over the last two weeks 2018   Little interest or pleasure in doing things Not at all   Feeling down, depressed or hopeless Not at all   Total Score PHQ 2 0   Trouble falling or staying asleep, or sleeping too much -   Feeling tired or having little energy -   Poor appetite or overeating -   Feeling bad about yourself - or that you are a failure or have let yourself or your family down -   Trouble concentrating on things such as school, work, reading or watching TV -   Moving or speaking so slowly that other people could have noticed; or the opposite being so fidgety that others notice -   Thoughts of being better off dead, or hurting yourself in some way -   How difficult have these problems made it for you to do your work, take care of your home and get along with others -     Fall Risk Assessment:     Fall Risk Assessment, last 12 mths 2018   Able to walk? Yes   Fall in past 12 months?  No     Abuse Screening:     Abuse Screening Questionnaire 2/9/2018   Do you ever feel afraid of your partner? N   Are you in a relationship with someone who physically or mentally threatens you? N   Is it safe for you to go home? Y     Coordination of Care Questionaire:   1. Have you been to the ER, urgent care clinic since your last visit? Hospitalized since your last visit? No    2. Have you seen or consulted any other health care providers outside of the [address] [place] [address] since your last visit? Include any pap smears or colon screening. NO    Advanced Directive:   1. Do you have an Advanced Directive? YES    2. Would you like information on Advanced Directives?  NO

## 2018-02-09 NOTE — PROGRESS NOTES
Date of Service:  2018   Patient Name:  Tiffany Melvin Sr.   Patient :  1955     This is a Subsequent Medicare Annual Wellness Visit providing Personalized Prevention Plan Services (PPPS) (Performed 12 months after initial AWV and PPPS )     I have reviewed the patient's medical history in detail and updated the computerized patient record. History     Past Medical History:   Diagnosis Date    Diabetes ([address]     H. pylori infection     s/p prevpack    H/O esophagogastroduodenoscopy 3/1/2013    irregular z-line, small 1cm submucosal nodule    Hypercholesterolemia     Hypertension     Rheumatoid arthritis(714.0)       Past Surgical History:   Procedure Laterality Date    ENDOSCOPY, COLON, DIAGNOSTIC      HX HERNIA REPAIR      HX WISDOM TEETH EXTRACTION       Current Outpatient Prescriptions   Medication Sig Dispense Refill    ergocalciferol (ERGOCALCIFEROL) 50,000 unit capsule Take 1 Cap by mouth every seven (7) days. 24 Cap 3    metFORMIN (GLUCOPHAGE) 1,000 mg tablet TAKE 1 TABLET TWICE DAILY WITH MEALS 180 Tab 1    Blood-Glucose Meter (ACCU-CHEK IDANIA) misc 1 Each by Does Not Apply route daily. 1 Each 0    losartan-hydroCHLOROthiazide (HYZAAR) 50-12.5 mg per tablet TAKE 1 TABLET EVERY DAY 90 Tab 3    fluticasone (FLONASE) 50 mcg/actuation nasal spray 2 Sprays by Both Nostrils route daily. 3 Bottle 1    ACCU-CHEK SMARTVIEW TEST STRIP strip CHECK BLOOD SUGAR TWICE DAILY 200 Strip 3    traMADol (ULTRAM) 50 mg tablet TAKE 1 TABLET EVERY 8 HOURS AS NEEDED FOR PAIN  ( MAXIMUM DAILY AMOUNT:  150MG  ) 45 Tab 1    leflunomide (ARAVA) 20 mg tablet Take 1 Tab by mouth daily. 90 Tab 1    ACCU-CHEK SMARTVIEW CONTRL SOL soln USE AS DIRECTED 1 mL 3    aspirin 81 mg tablet Take 1 Tab by mouth daily. 30 Tab 2    omeprazole (PRILOSEC) 40 mg capsule Take 1 Cap by mouth daily. 90 Cap 3    acetaminophen (TYLENOL) 500 mg tablet 500 mg as needed.       red yeast rice extract 600 mg cap Take 1,800 mg by mouth daily.        Allergies   Allergen Reactions    Statins-Hmg-Coa Reductase Inhibitors Myalgia     Family History   Problem Relation Age of Onset    Heart Disease Mother     Heart Disease Father    Jarrett Sarah Arthritis-rheumatoid Sister     Hypertension Sister     Heart Disease Sister     Heart Disease Brother      Social History   Substance Use Topics    Smoking status: Former Smoker    Smokeless tobacco: Never Used    Alcohol use No     Patient Active Problem List   Diagnosis Code    Hyperlipidemia with target LDL less than 100 E78.5    Diabetes mellitus type 2, noninsulin dependent ([address] E11.9    Rheumatoid arthritis ([facility] M06.9    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    Chronic gastritis K29.50    Esophagitis K20.9    Neural foraminal stenosis of cervical spine M99.81    Routine health maintenance Z00.00    Myalgia M79.1    TERRY (obstructive sleep apnea) G47.33    Decreased libido R68.82    Elevated TSH R94.6    Vitamin D deficiency E55.9    Right thyroid nodule E04.1    Essential hypertension I10    Advance care planning Z71.89    Statin intolerance Z78.9    DDD (degenerative disc disease), lumbosacral M51.37       Living situation:   -- Lives with family    Diet, Lifestyle: generally follows a low fat low cholesterol diet, follows a diabetic diet regularly    Exercise level: moderately active    Depression Risk Factor Screening:     PHQ over the last two weeks 2/9/2018   Little interest or pleasure in doing things Not at all   Feeling down, depressed or hopeless Not at all   Total Score PHQ 2 0   Trouble falling or staying asleep, or sleeping too much -   Feeling tired or having little energy -   Poor appetite or overeating -   Feeling bad about yourself - or that you are a failure or have let yourself or your family down -   Trouble concentrating on things such as school, work, reading or watching TV -   Moving or speaking so slowly that other people could have noticed; or the opposite being so fidgety that others notice -   Thoughts of being better off dead, or hurting yourself in some way -   How difficult have these problems made it for you to do your work, take care of your home and get along with others -     Alcohol Risk Factor Screening: You do not drink alcohol or very rarely. Functional Ability and Level of Safety:     Hearing Loss   Hearing is good. Activities of Daily Living   Self-care. Requires assistance with: no ADLs    Fall Risk     Fall Risk Assessment, last 12 mths 2/9/2018   Able to walk? Yes   Fall in past 12 months? No     Abuse Screen   Patient is not abused    Review of Systems   A comprehensive review of systems was negative except for that written in the separate problem-oriented documentation.     Physical Examination     VS:   Visit Vitals    /86 (BP 1 Location: Right arm, BP Patient Position: Sitting)  Comment: manual    Pulse 80    Temp 96.2 °F (35.7 °C) (Oral)    Resp 12    Ht 5' 9\" (1.753 m)    Wt 190 lb 12.8 oz (86.5 kg)    SpO2 96%    BMI 28.18 kg/m2        Hearing Screening    125Hz 250Hz 500Hz 1000Hz 2000Hz 3000Hz 4000Hz 6000Hz 8000Hz   Right ear:   40 40 40  40     Left ear:   40 40 40  40        Visual Acuity Screening    Right eye Left eye Both eyes   Without correction: 20/30 20/25 20/20   With correction:           Evaluation of Cognitive Function:  Mood/affect:  neutral  Appearance: age appropriate  Family member/caregiver input: wife    Patient Care Team:  Adriana Hearn MD as PCP - General (Family Practice)  Jenni Sandoval MD as Consulting Provider (Neurosurgery)  Marilyn Hendrix MD as Consulting Provider (Gastroenterology)  Mitra Blanco MD as Consulting Provider (Sleep Medicine)  Wilfredo Roldan MD as Consulting Provider (Endocrinology)  Corwin Michael OD as Consulting Provider (Optometry)  Marla Quan MD as Consulting Provider (Ophthalmology)  Clarence Hernandez MD as Consulting Provider (Gastroenterology)  Zackery Nath MD (Rheumatology)    Advice/Counseling   Education and counseling provided:  Are appropriate based on today's review and evaluation  End-of-Life planning (with patient's consent)  Pneumococcal Vaccine  Influenza Vaccine  Prostate cancer screening tests (PSA, covered annually)  Colorectal cancer screening tests  Cardiovascular screening blood test  Bone mass measurement (DEXA)  Screening for glaucoma  Diabetes screening test  Medical nutrition therapy for individuals with diabetes or renal disease  Diabetes outpatient self-management training services      Assessment/Plan       ICD-10-CM ICD-9-CM    1. Medicare annual wellness visit, subsequent Z00.00 V70.0    2. Screening for alcoholism Z13.89 V79.1 [place] ANNUAL ALCOHOL SCREEN 15 MIN   3. Screening for depression Z13.89 V79.0 DEPRESSION SCREEN ANNUAL   4.  Special screening for malignant neoplasm of prostate Z12.5 V76.44 PSA SCREENING (SCREENING)       Amy Jackson Sr. was provided a written 5-year personalized preventive services plan, which was included in his AVS.    Nadine Suero MD  Internal Medicine, Family Medicine & Sports Medicine

## 2018-02-09 NOTE — PATIENT INSTRUCTIONS
To Do:  · labwork on your way out  · You owe me a copy of your Advance Directive, by the way.  :)      Notes from your doctor:  · Keep up the great work      [facility] Wellness Notes:  · Included below is some information about Advance Directives. There is also an excellent website for it for the state of [place] ([email or web address]); if you end up making an Advance Directive, bring me a copy so I can put it in your medical record  · We did your medicare wellness visit today. Below is your \"5 year preventive services plan\"      Medicare Part B Preventive Services Limitations Recommendation Scheduled   Bone Mass Measurement  (age 72 & older, biennial) Requires diagnosis related to osteoporosis or estrogen deficiency. Biennial benefit unless patient has history of long-term glucocorticoid tx or baseline is needed because initial test was by other method Not applicable      Cardiovascular Screening Blood Tests (every 5 years)  Total cholesterol, HDL, Triglycerides Order as a panel if possible Up to date.      Colorectal Cancer Screening  -Fecal occult blood test (annual)  -Flexible sigmoidoscopy (5y)  -Screening colonoscopy (10y)  -Barium Enema    Done Apr 2012      Counseling to Prevent Tobacco Use (up to 8 sessions per year)  - Counseling greater than 3 and up to 10 minutes  - Counseling greater than 10 minutes Patients must be asymptomatic of tobacco-related conditions to receive as preventive service Not applicable      Diabetes Screening Tests (at least every 3 years, Medicare covers annually or at 6-month intervals for prediabetic patients)      Fasting blood sugar (FBS) or glucose tolerance test (GTT) Patient must be diagnosed with one of the following:  -Hypertension, Dyslipidemia, obesity, previous impaired FBS or GTT  Or any two of the following: overweight, FH of diabetes, age ? 72, history of gestational diabetes, birth of baby weighing more than 9 pounds Not applicable      Diabetes Self-Management Training (DSMT) (no USPSTF recommendation) Requires referral by treating physician for patient with diabetes or renal disease. 10 hours of initial DSMT session of no less than 30 minutes each in a continuous 12-month period. 2 hours of follow-up DSMT in subsequent years. Alek Leslie Let me know if you are interested. Glaucoma Screening (no USPSTF recommendation) Diabetes mellitus, family history, , age 48 or over,  American, age 72 or over Done Apr 2015      Human Immunodeficiency Virus (HIV) Screening (annually for increased risk patients)  HIV-1 and HIV-2 by EIA, PEDRO, rapid antibody test, or oral mucosa transudate Patient must be at increased risk for HIV infection per USPSTF guidelines or pregnant. Tests covered annually for patients at increased risk. Pregnant patients may receive up to 3 test during pregnancy. Not applicable      Medical Nutrition Therapy (MNT) (for diabetes or renal disease not recommended schedule) Requires referral by treating physician for patient with diabetes or renal disease. Can be provided in same year as diabetes self-management training (DSMT), and CMS recommends medical nutrition therapy take place after DSMT. Up to 3 hours for initial year and 2 hours in subsequent years. Not applicable      Prostate Cancer Screening (annually up to age 76)  - Digital rectal exam (ERIC)  - Prostate specific antigen (PSA) Annually (age 48 or over), ERIC not paid separately when covered E/M service is provided on same date Done March 2014. [employer] suggests every 2 years. Will do with next blood draw.    Seasonal Influenza Vaccination (annually)    Done today      Pneumococcal Vaccination (once after 72)            Hepatitis B Vaccinations (if medium/high risk) Medium/high risk factors: End-stage renal disease,  Hemophiliacs who received Factor VIII or IX concentrates, Clients of institutions for the mentally retarded, Persons who live in the same house as a HepB virus carrier, Homosexual men, Illicit injectable drug abusers. Not applicable      Screening Mammography (biennial age 54-69)? Annually (age 36 or over) Not applicable      Screening Pap Tests and Pelvic Examination (up to age 79 and after 79 if unknown history or abnormal study last 10 years) Every 25 months except high risk Not applicable      Ultrasound Screening for Abdominal Aortic Aneurysm (AAA) (once) Patient must be referred through IPPE and not have had a screening for abdominal aortic aneurysm before under Medicare.  Limited to patients who meet one of the following criteria:  - Men who are 73-68 years old and have smoked more than 100 cigarettes in their lifetime.  -Anyone with a FH of AAA  -Anyone recommended for screening by USPSTF Not applicable      Family Practice Management 2011

## 2018-02-09 NOTE — PROGRESS NOTES
[employer]  Primary Care Office Visit - Problem-Oriented (Separate from the Medicare Wellness Visit)    : 1955   Yung Vazquez is a 57 y.o. male    Assessment/Plan:       ICD-10-CM   5. Diabetes mellitus type 2, noninsulin dependent ([facility] [address] - unclear control, although well controlled 3mo ago with A1c 6.4  - labs  - up to date with ophtho & foot exams  - Lipid lowering therapy not prescibed for medical reasons. - on ARB, ASA  Key Antihyperglycemic Medications           metFORMIN (GLUCOPHAGE) 1,000 mg tablet  (Taking) TAKE 1 TABLET TWICE DAILY WITH MEALS           E11.9   6. Rheumatoid arthritis involving multiple sites, unspecified rheumatoid factor presence (HCC)  - followed by rheum (Dr. Shahnaz Pelaez)     M06.9   7. Hemoglobin C trait (HCC) - chronic, ongoing  - will assess for anemia with CBC     D58.2   8. Essential hypertension - well controlled  - no change to current regimen  Key CAD CHF Meds           losartan-hydroCHLOROthiazide (HYZAAR) 50-12.5 mg per tablet  (Taking) TAKE 1 TABLET EVERY DAY    aspirin 81 mg tablet  (Taking) Take 1 Tab by mouth daily. I10   9. Hyperlipidemia with target LDL less than 100 - unclear control E78.5   10. Statin intolerance - ongoing  - labs     Z78.9   11. Vitamin D deficiency - unclear control  - labs     E55.9   12. Myalgia - not currently symptomatic, however has sx intermittently  - labs     M79.1   13. Elevated TSH - unclear control  - labs     R94.6   14. Chronic gastritis without bleeding, unspecified gastritis type - currently not symptomatic, off of PPI K29.50   15. Esophagitis - currently not symptomatic, off of PPI  - Future considerations: if sx recur, then \"low dose\" (20mg) prilosec?  K20.9       Orders Placed This Encounter    LIPID PANEL    TSH 3RD GENERATION    VITAMIN D, 25 HYDROXY    METABOLIC PANEL, COMPREHENSIVE    HEMOGLOBIN A1C WITH EAG    CBC WITH AUTOMATED DIFF    CK         Derrick Augustine MD  Internal Medicine, Family Medicine & Sports Medicine  2/9/2018, 10:09 AM    Patient Instructions (included in AVS): To Do:  · labwork on your way out  · You owe me a copy of your Advance Directive, by the way.  :)      Notes from your doctor:  · Keep up the great work      [facility] Wellness Notes:  · Included below is some information about Advance Directives. There is also an excellent website for it for the state of [place] ([email or web address]); if you end up making an Advance Directive, bring me a copy so I can put it in your medical record  · We did your medicare wellness visit today. Below is your \"5 year preventive services plan\"    History:   Jose Armando Harvey is a 57 y.o. male presenting to address:  Chief Complaint   Patient presents with   [facility] Annual Wellness Visit            Today is a good day. Prescription prilosec gave him a \"bad feeling\" in the morning, thus discontinued the medication, and \"bad feeling\" resolved, with minimal return of reflux/heartburn symptoms. Had most recent OV with Dr. Mary Cooper last month. \"He always wants to be trying new expensive medicines on me, and I know it is because I hurt. But I don't always hurt. Like today, I'm feeling pretty good. \"    Is compliant with his medications, denies any AE. [ see AWV documentation for pertinent PMHx, PSHx, FHx, allergies & meds]     Problem List:     [ see AWV documentation for active problem list ]     Review of Systems:     (only positive ROS in this note, all negatives included in  [address] documentation)     Review of Systems   Musculoskeletal: Positive for joint pain and myalgias. Physical Assessment:   VS:  [ see AWV documentation for Vital Signs ]    Physical Exam   Constitutional: He is oriented to person, place, and time. He appears well-developed and well-nourished. No distress. HENT:   Head: Normocephalic and atraumatic.    Right Ear: External ear normal.   Left Ear: External ear normal. Mouth/Throat: Oropharynx is clear and moist.   Eyes: EOM are normal. Pupils are equal, round, and reactive to light. Neck: Normal range of motion. Neck supple. Cardiovascular: Normal rate, regular rhythm and normal heart sounds. No murmur heard. Pulmonary/Chest: Effort normal and breath sounds normal. No respiratory distress. He has no wheezes. Abdominal: Soft. Bowel sounds are normal. There is no tenderness. There is no rigidity, no rebound and no guarding. Musculoskeletal: Normal range of motion. Neurological: He is alert and oriented to person, place, and time. No cranial nerve deficit. Skin: Skin is warm and dry. He is not diaphoretic. Psychiatric: He has a normal mood and affect. His behavior is normal. Judgment and thought content normal.   Nursing note reviewed. Records Review:     Rheum note (1/18/2018):    Seropositive, long-standing disease, rheumatoid arthritis, on Arava monotherapy. Interestingly, he has relatively little stigmata of long-standing rheumatoid and synovitis appears fairly well controlled; however Vectra DA test and self-report tests are both consistent with high-level disease activity.

## 2018-02-10 LAB
25(OH)D3+25(OH)D2 SERPL-MCNC: 37.1 NG/ML (ref 30–100)
ALBUMIN SERPL-MCNC: 4.4 G/DL (ref 3.6–4.8)
ALBUMIN/GLOB SERPL: 1.5 {RATIO} (ref 1.2–2.2)
ALP SERPL-CCNC: 89 IU/L (ref 39–117)
ALT SERPL-CCNC: 21 IU/L (ref 0–44)
AST SERPL-CCNC: 16 IU/L (ref 0–40)
BASOPHILS # BLD AUTO: 0 X10E3/UL (ref 0–0.2)
BASOPHILS NFR BLD AUTO: 1 %
BILIRUB SERPL-MCNC: 0.5 MG/DL (ref 0–1.2)
BUN SERPL-MCNC: 9 MG/DL (ref 8–27)
BUN/CREAT SERPL: 11 (ref 10–24)
CALCIUM SERPL-MCNC: 9.6 MG/DL (ref 8.6–10.2)
CHLORIDE SERPL-SCNC: 98 MMOL/L (ref 96–106)
CHOLEST SERPL-MCNC: 261 MG/DL (ref 100–199)
CK SERPL-CCNC: 153 U/L (ref 24–204)
CO2 SERPL-SCNC: 26 MMOL/L (ref 18–29)
CREAT SERPL-MCNC: 0.81 MG/DL (ref 0.76–1.27)
EOSINOPHIL # BLD AUTO: 0.4 X10E3/UL (ref 0–0.4)
EOSINOPHIL NFR BLD AUTO: 7 %
ERYTHROCYTE [DISTWIDTH] IN BLOOD BY AUTOMATED COUNT: 15.2 % (ref 12.3–15.4)
EST. AVERAGE GLUCOSE BLD GHB EST-MCNC: 134 MG/DL
GFR SERPLBLD CREATININE-BSD FMLA CKD-EPI: 109 ML/MIN/1.73
GFR SERPLBLD CREATININE-BSD FMLA CKD-EPI: 94 ML/MIN/1.73
GLOBULIN SER CALC-MCNC: 3 G/DL (ref 1.5–4.5)
GLUCOSE SERPL-MCNC: 95 MG/DL (ref 65–99)
HBA1C MFR BLD: 6.3 % (ref 4.8–5.6)
HCT VFR BLD AUTO: 40.7 % (ref 37.5–51)
HDLC SERPL-MCNC: 43 MG/DL
HGB BLD-MCNC: 13.8 G/DL (ref 13–17.7)
IMM GRANULOCYTES # BLD: 0 X10E3/UL (ref 0–0.1)
IMM GRANULOCYTES NFR BLD: 0 %
INTERPRETATION, 910389: NORMAL
LDLC SERPL CALC-MCNC: 186 MG/DL (ref 0–99)
LYMPHOCYTES # BLD AUTO: 1.6 X10E3/UL (ref 0.7–3.1)
LYMPHOCYTES NFR BLD AUTO: 31 %
Lab: NORMAL
MCH RBC QN AUTO: 25.5 PG (ref 26.6–33)
MCHC RBC AUTO-ENTMCNC: 33.9 G/DL (ref 31.5–35.7)
MCV RBC AUTO: 75 FL (ref 79–97)
MONOCYTES # BLD AUTO: 0.7 X10E3/UL (ref 0.1–0.9)
MONOCYTES NFR BLD AUTO: 13 %
NEUTROPHILS # BLD AUTO: 2.5 X10E3/UL (ref 1.4–7)
NEUTROPHILS NFR BLD AUTO: 48 %
PLATELET # BLD AUTO: 243 X10E3/UL (ref 150–379)
POTASSIUM SERPL-SCNC: 4.4 MMOL/L (ref 3.5–5.2)
PROT SERPL-MCNC: 7.4 G/DL (ref 6–8.5)
PSA SERPL-MCNC: 0.9 NG/ML (ref 0–4)
RBC # BLD AUTO: 5.42 X10E6/UL (ref 4.14–5.8)
SODIUM SERPL-SCNC: 142 MMOL/L (ref 134–144)
TRIGL SERPL-MCNC: 160 MG/DL (ref 0–149)
TSH SERPL DL<=0.005 MIU/L-ACNC: 1.49 UIU/ML (ref 0.45–4.5)
VLDLC SERPL CALC-MCNC: 32 MG/DL (ref 5–40)
WBC # BLD AUTO: 5.2 X10E3/UL (ref 3.4–10.8)

## 2018-02-18 NOTE — ACP (ADVANCE CARE PLANNING)
Advance Care Planning (ACP) Provider Conversation Snapshot    Date of ACP Conversation: 02/09/18  Persons included in Conversation:  patient and POA  Length of ACP Conversation in minutes:  <16 minutes (Non-Billable)    Authorized Decision Maker (if patient is incapable of making informed decisions): This person is:   Healthcare Agent/Medical Power of  under Advance Directive, his wife          For Patients with Decision Making Capacity:   Values/Goals: Exploration of values, goals, and preferences if recovery is not expected, even with continued medical treatment in the event of:  Imminent death  Severe, permanent brain injury    Conversation Outcomes / Follow-Up Plan:   Recommended completion of Advance Directive form after review of ACP materials and conversation with prospective healthcare agent     Both Danielle Goznalez his wife state that they do have Advance Directives, they just have yet to bring me copies. They designate each other to make decisions on their behalf if case they are incapable.     Sebastian Mohamud MD  Internal Medicine, Family Medicine & Sports Medicine

## 2018-03-21 ENCOUNTER — OFFICE VISIT (OUTPATIENT)
Dept: FAMILY MEDICINE CLINIC | Age: 63
End: 2018-03-21

## 2018-03-21 VITALS
SYSTOLIC BLOOD PRESSURE: 158 MMHG | WEIGHT: 180.4 LBS | TEMPERATURE: 97 F | HEIGHT: 69 IN | RESPIRATION RATE: 18 BRPM | DIASTOLIC BLOOD PRESSURE: 96 MMHG | OXYGEN SATURATION: 96 % | BODY MASS INDEX: 26.72 KG/M2 | HEART RATE: 81 BPM

## 2018-03-21 DIAGNOSIS — M25.572 ACUTE LEFT ANKLE PAIN: ICD-10-CM

## 2018-03-21 DIAGNOSIS — I10 ESSENTIAL HYPERTENSION: Chronic | ICD-10-CM

## 2018-03-21 DIAGNOSIS — E11.9 DIABETES MELLITUS TYPE 2, NONINSULIN DEPENDENT (HCC): Primary | Chronic | ICD-10-CM

## 2018-03-21 DIAGNOSIS — E11.40 TYPE 2 DIABETES MELLITUS WITH DIABETIC NEUROPATHY, WITHOUT LONG-TERM CURRENT USE OF INSULIN (HCC): ICD-10-CM

## 2018-03-21 RX ORDER — METFORMIN HYDROCHLORIDE 1000 MG/1
1000 TABLET ORAL 2 TIMES DAILY WITH MEALS
Qty: 60 TAB | Refills: 0 | Status: SHIPPED | OUTPATIENT
Start: 2018-03-21 | End: 2018-03-21 | Stop reason: SDUPTHER

## 2018-03-21 RX ORDER — GABAPENTIN 100 MG/1
100 CAPSULE ORAL
Qty: 90 CAP | Refills: 1 | Status: SHIPPED | OUTPATIENT
Start: 2018-03-21 | End: 2018-05-11 | Stop reason: SDUPTHER

## 2018-03-21 RX ORDER — METFORMIN HYDROCHLORIDE 1000 MG/1
TABLET ORAL
Qty: 180 TAB | Refills: 1 | Status: SHIPPED | OUTPATIENT
Start: 2018-03-21 | End: 2019-03-01 | Stop reason: SDUPTHER

## 2018-03-21 NOTE — PROGRESS NOTES
Naomi Prater is a 57 y.o. male (: 1955) presenting to address:    Chief Complaint   Patient presents with    Foot Pain     L foot pain since 3/3/18 (Hit foot on bed)    Hand Pain     Bilateral tingling. Increasing at night. Has lasted for 3 wks. Pt states he has been out of Metformin for 3 days. Vitals:    18 1201   BP: (!) 174/99   Pulse: 81   Resp: 18   Temp: 97 °F (36.1 °C)   TempSrc: Oral   SpO2: 96%   Weight: 180 lb 6.4 oz (81.8 kg)   Height: 5' 9\" (1.753 m)   PainSc:   0 - No pain       Hearing/Vision:   No exam data present    Learning Assessment:     Learning Assessment 2014   PRIMARY LEARNER Patient   HIGHEST LEVEL OF EDUCATION - PRIMARY LEARNER  SOME COLLEGE   BARRIERS PRIMARY LEARNER NONE   CO-LEARNER CAREGIVER No   PRIMARY LANGUAGE ENGLISH   LEARNER PREFERENCE PRIMARY OTHER (COMMENT)   ANSWERED BY Patient   RELATIONSHIP SELF     Depression Screening:     PHQ over the last two weeks 3/21/2018   Little interest or pleasure in doing things Several days   Feeling down, depressed or hopeless Several days   Total Score PHQ 2 2   Trouble falling or staying asleep, or sleeping too much -   Feeling tired or having little energy -   Poor appetite or overeating -   Feeling bad about yourself - or that you are a failure or have let yourself or your family down -   Trouble concentrating on things such as school, work, reading or watching TV -   Moving or speaking so slowly that other people could have noticed; or the opposite being so fidgety that others notice -   Thoughts of being better off dead, or hurting yourself in some way -   How difficult have these problems made it for you to do your work, take care of your home and get along with others -     Fall Risk Assessment:     Fall Risk Assessment, last 12 mths 3/21/2018   Able to walk? Yes   Fall in past 12 months? Yes   Fall with injury?  No   Number of falls in past 12 months 1   Fall Risk Score 1     Abuse Screening: Abuse Screening Questionnaire 3/21/2018   Do you ever feel afraid of your partner? N   Are you in a relationship with someone who physically or mentally threatens you? N   Is it safe for you to go home? Y     Coordination of Care Questionaire:   1. Have you been to the ER, urgent care clinic since your last visit? Hospitalized since your last visit? NO    2. Have you seen or consulted any other health care providers outside of the [facility] [place] [facility]\"" since your last visit? Include any pap smears or colon screening. YES Dr. Melanie Chapa, 1/18/18 (Notes in system)    Advanced Directive:   1. Do you have an Advanced Directive? NO    2. Would you like information on Advanced Directives?  NO

## 2018-03-21 NOTE — PATIENT INSTRUCTIONS
To Do: · Start your gabapentin 100mg before bed to help the tingling funny feelings in your hands and feet. If it helps, but you think you want to take more, just call [facility] and let us know, so I can help change your prescription so you don't run out. · Use the paper prescription for metformin if you think it is going to take humana too long to get your prescription to your house. · Keep an eye on your left ankle. If it starts getting in the way of the activities you want to be doing, let me know, and we could THEN check an x-ray (no need for one right now). · We will just let your blood pressure \"ride\" for now. .. If you want, keep an eye on it at home       Diabetic Neuropathy: Care Instructions  Your Care Instructions    When you have diabetes, your blood sugar level may get too high. Over time, high blood sugar levels can damage nerves. This is called diabetic neuropathy. Nerve damage can cause pain, burning, tingling, and numbness and may leave you feeling weak. The feet are often affected. When you have nerve damage in your feet, you cannot feel your feet and toes as well as normal and may not notice cuts or sores. Even a small injury can lead to a serious infection. It is very important that you follow your doctor's advice on foot care. Sometimes diabetes damages nerves that help the body function. If this happens, your blood pressure, sweating, digestion, and urination might be affected. Your doctor may give you a target blood sugar level that is higher or lower than you are used to. Try to keep your blood sugar very close to this target level to prevent more damage. Follow-up care is a key part of your treatment and safety. Be sure to make and go to all appointments, and call your doctor if you are having problems. It's also a good idea to know your test results and keep a list of the medicines you take. How can you care for yourself at home? · Take your medicines exactly as prescribed.  Call your doctor if you think you are having a problem with your medicine. It is very important that you take your insulin or diabetes pills as your doctor tells you. · Try to keep blood sugar at your target level. ¨ Eat a variety of healthy foods, with carbohydrate spread out in your meals. A dietitian can help you plan meals. ¨ Try to get at least 30 minutes of exercise on most days. ¨ Check your blood sugar as many times each day as your doctor recommends. · Take and record your blood pressure at home if your doctor tells you to. Learn the importance of the two measures of blood pressure (such as 130 over 80, or 130/80). To take your blood pressure at home:  ¨ Ask your doctor to check your blood pressure monitor to be sure it is accurate and the cuff fits you. Also ask your doctor to watch you to make sure that you are using it right. ¨ Do not use medicine known to raise blood pressure (such as some nasal decongestant sprays) before taking your blood pressure. ¨ Avoid taking your blood pressure if you have just exercised or are nervous or upset. Rest at least 15 minutes before you take a reading. · Take pain medicines exactly as directed. ¨ If the doctor gave you a prescription medicine for pain, take it as prescribed. ¨ If you are not taking a prescription pain medicine, ask your doctor if you can take an over-the-counter medicine. · Do not smoke. Smoking can increase your chance for a heart attack or stroke. If you need help quitting, talk to your doctor about stop-smoking programs and medicines. These can increase your chances of quitting for good. · Limit alcohol to 2 drinks a day for men and 1 drink a day for women. Too much alcohol can cause health problems. · Eat small meals often, rather than 2 or 3 large meals a day. To care for your feet  · Prevent injury by wearing shoes at all times, even when you are indoors. · Do foot care as part of your daily routine.  Wash your feet and then rub lotion on your feet, but not between your toes. Use a handheld mirror or magnifying mirror to inspect your feet for blisters, cuts, cracks, or sores. · Have your toenails trimmed and filed straight across. · Wear shoes and socks that fit well. Soft shoes that have good support and that fit well (such as tennis shoes) are best for your feet. · Check your shoes for any loose objects or rough edges before you put them on. · Ask your doctor to check your feet during each visit. Your doctor may notice a foot problem you have missed. · Get early treatment for any foot problem, even a minor one. When should you call for help? Call your doctor now or seek immediate medical care if:  ? · You have symptoms of infection, such as:  ¨ Increased pain, swelling, warmth, or redness. ¨ Red streaks leading from the area. ¨ Pus draining from the area. ¨ A fever. ? · You have new or worse numbness, pain, or tingling in any part of your body. ? Watch closely for changes in your health, and be sure to contact your doctor if:  ? · You have a new problem with your feet, such as:  ¨ A new sore or ulcer. ¨ A break in the skin that is not healing after several days. ¨ Bleeding corns or calluses. ¨ An ingrown toenail. ? · You do not get better as expected. Where can you learn more? Go to http://nikita-hieu.info/. Enter Z619 in the search box to learn more about \"Diabetic Neuropathy: Care Instructions. \"  Current as of: March 13, 2017  Content Version: 11.4  © 2388-6039 [employer]. Care instructions adapted under license by [employer] (which disclaims liability or warranty for this information). If you have questions about a medical condition or this instruction, always ask your healthcare professional. [address] any warranty or liability for your use of this information.

## 2018-03-21 NOTE — MR AVS SNAPSHOT
Kaitlyn Ulloa 
 
 
 [address] [facility] [id number] [phone] Patient: Andres Roland Sr. MRN: [id number] HPX:[id number] Visit Information Date & Time Provider Department Dept. Phone Encounter #  
 3/21/2018 11:40 AM Danii Jones MD [facility] [phone] [id number] Your Appointments 5/11/2018 10:00 AM  
Follow Up with Danii Jones MD  
[facility]: Return in 3 month for f/u appointment [address] [facility] [id number] [phone]  
  
   
 [address] Upcoming Health Maintenance Date Due  
 EYE EXAM RETINAL OR DILATED Q1 6/5/2018 HEMOGLOBIN A1C Q6M 8/9/2018 FOOT EXAM Q1 11/10/2018 MICROALBUMIN Q1 11/10/2018 LIPID PANEL Q1 2/9/2019 MEDICARE YEARLY EXAM 2/10/2019 COLONOSCOPY 4/6/2022 DTaP/Tdap/Td series (3 - Td) 4/20/2025 Allergies as of 3/21/2018  Review Complete On: 3/21/2018 By: Danii Jones MD  
  
 Severity Noted Reaction Type Reactions Statins-hmg-coa Reductase Inhibitors Medium 04/20/2015    Myalgia Current Immunizations  Reviewed on 4/20/2015 Name Date Influenza Vaccine 9/8/2016 10:01 AM, 11/23/2015 10:01 AM, 9/29/2014, 10/16/2013, 10/18/2012 Influenza Vaccine (Quad) PF 9/6/2017 10:16 AM  
 Pneumococcal Conjugate (PCV-13) 11/17/2016 Pneumococcal Polysaccharide (PPSV-23) 8/11/2009 TB Skin Test (PPD) Intradermal 2/19/2013 Tdap 4/20/2015 10:12 AM, 2/19/2009 Zoster Vaccine, Live 10/17/2016 Not reviewed this visit You Were Diagnosed With   
  
 Codes Comments Diabetes mellitus type 2, noninsulin dependent ([facility]    -  Primary ICD-10-CM: E11.9 ICD-9-CM: 250.00 Vitals BP Pulse Temp Resp Height(growth percentile) Weight(growth percentile) (!) 158/96 (BP 1 Location: Right arm, BP Patient Position: Sitting) 81 97 °F (36.1 °C) (Oral) 18 5' 9\" (1.753 m) 180 lb 6.4 oz (81.8 kg) SpO2 BMI Smoking Status 96% 26.64 kg/m2 Former Smoker Vitals History BMI and BSA Data Body Mass Index Body Surface Area  
 26.64 kg/m 2 2 m 2 Preferred Pharmacy Pharmacy Name Phone [facility] [address] [facility] [address] [place] [address] [phone] Your Updated Medication List  
  
   
This list is accurate as of 3/21/18  1:17 PM.  Always use your most recent med list.  
  
  
  
  
 ACCU-CHEK SMARTVIEW CONTRL SOL Soln Generic drug:  Blood Glucose Control, Normal  
USE AS DIRECTED ACCU-CHEK SMARTVIEW TEST STRIP strip Generic drug:  glucose blood VI test strips CHECK BLOOD SUGAR TWICE DAILY  
  
 acetaminophen 500 mg tablet Commonly known as:  TYLENOL  
500 mg as needed. aspirin 81 mg tablet Take 1 Tab by mouth daily. Blood-Glucose Meter Misc Commonly known as:  ACCU-CHEK IDANIA  
1 Each by Does Not Apply route daily. ergocalciferol 50,000 unit capsule Commonly known as:  ERGOCALCIFEROL Take 1 Cap by mouth every seven (7) days. fluticasone 50 mcg/actuation nasal spray Commonly known as:  Jourdan Sandro 2 Sprays by Both Nostrils route daily. gabapentin 100 mg capsule Commonly known as:  NEURONTIN Take 1 Cap by mouth nightly. leflunomide 20 mg tablet Commonly known as:  Alina Cunas Take 1 Tab by mouth daily. losartan-hydroCHLOROthiazide 50-12.5 mg per tablet Commonly known as:  HYZAAR  
TAKE 1 TABLET EVERY DAY  
  
 metFORMIN 1,000 mg tablet Commonly known as:  GLUCOPHAGE  
TAKE 1 TABLET TWICE DAILY WITH MEALS  
  
 red yeast rice extract 600 mg Cap Take 1,800 mg by mouth daily. traMADol 50 mg tablet Commonly known as:  ULTRAM  
TAKE 1 TABLET EVERY 8 HOURS AS NEEDED FOR PAIN  ( MAXIMUM DAILY AMOUNT:  150MG  ) Prescriptions Sent to Pharmacy Refills  
 metFORMIN (GLUCOPHAGE) 1,000 mg tablet 1 Sig: TAKE 1 TABLET TWICE DAILY WITH MEALS Class: Normal  
 Pharmacy: [address] [place] [address], [address] [place] [address] Ph #: [phone]  
 gabapentin (NEURONTIN) 100 mg capsule 1 Sig: Take 1 Cap by mouth nightly. Class: Normal  
 Pharmacy: [address] [place] [address], [address] [place] [address] Ph #: [phone] Route: Oral  
  
Patient Instructions To Do: 
· Start your gabapentin 100mg before bed to help the tingling funny feelings in your hands and feet. If it helps, but you think you want to take more, just call [place] and let us know, so I can help change your prescription so you don't run out. · Use the paper prescription for metformin if you think it is going to take humana too long to get your prescription to your house. · Keep an eye on your left ankle. If it starts getting in the way of the activities you want to be doing, let me know, and we could THEN check an x-ray (no need for one right now). · We will just let your blood pressure \"ride\" for now. .. If you want, keep an eye on it at home Diabetic Neuropathy: Care Instructions Your Care Instructions When you have diabetes, your blood sugar level may get too high. Over time, high blood sugar levels can damage nerves. This is called diabetic neuropathy. Nerve damage can cause pain, burning, tingling, and numbness and may leave you feeling weak. The feet are often affected. When you have nerve damage in your feet, you cannot feel your feet and toes as well as normal and may not notice cuts or sores. Even a small injury can lead to a serious infection. It is very important that you follow your doctor's advice on foot care. Sometimes diabetes damages nerves that help the body function.  If this happens, your blood pressure, sweating, digestion, and urination might be affected. Your doctor may give you a target blood sugar level that is higher or lower than you are used to. Try to keep your blood sugar very close to this target level to prevent more damage. Follow-up care is a key part of your treatment and safety. Be sure to make and go to all appointments, and call your doctor if you are having problems. It's also a good idea to know your test results and keep a list of the medicines you take. How can you care for yourself at home? · Take your medicines exactly as prescribed. Call your doctor if you think you are having a problem with your medicine. It is very important that you take your insulin or diabetes pills as your doctor tells you. · Try to keep blood sugar at your target level. ¨ Eat a variety of healthy foods, with carbohydrate spread out in your meals. A dietitian can help you plan meals. ¨ Try to get at least 30 minutes of exercise on most days. ¨ Check your blood sugar as many times each day as your doctor recommends. · Take and record your blood pressure at home if your doctor tells you to. Learn the importance of the two measures of blood pressure (such as 130 over 80, or 130/80). To take your blood pressure at home: ¨ Ask your doctor to check your blood pressure monitor to be sure it is accurate and the cuff fits you. Also ask your doctor to watch you to make sure that you are using it right. ¨ Do not use medicine known to raise blood pressure (such as some nasal decongestant sprays) before taking your blood pressure. ¨ Avoid taking your blood pressure if you have just exercised or are nervous or upset. Rest at least 15 minutes before you take a reading. · Take pain medicines exactly as directed. ¨ If the doctor gave you a prescription medicine for pain, take it as prescribed. ¨ If you are not taking a prescription pain medicine, ask your doctor if you can take an over-the-counter medicine. · Do not smoke. Smoking can increase your chance for a heart attack or stroke. If you need help quitting, talk to your doctor about stop-smoking programs and medicines. These can increase your chances of quitting for good. · Limit alcohol to 2 drinks a day for men and 1 drink a day for women. Too much alcohol can cause health problems. · Eat small meals often, rather than 2 or 3 large meals a day. To care for your feet · Prevent injury by wearing shoes at all times, even when you are indoors. · Do foot care as part of your daily routine. Wash your feet and then rub lotion on your feet, but not between your toes. Use a handheld mirror or magnifying mirror to inspect your feet for blisters, cuts, cracks, or sores. · Have your toenails trimmed and filed straight across. · Wear shoes and socks that fit well. Soft shoes that have good support and that fit well (such as tennis shoes) are best for your feet. · Check your shoes for any loose objects or rough edges before you put them on. · Ask your doctor to check your feet during each visit. Your doctor may notice a foot problem you have missed. · Get early treatment for any foot problem, even a minor one. When should you call for help? Call your doctor now or seek immediate medical care if: 
? · You have symptoms of infection, such as: 
¨ Increased pain, swelling, warmth, or redness. ¨ Red streaks leading from the area. ¨ Pus draining from the area. ¨ A fever. ? · You have new or worse numbness, pain, or tingling in any part of your body. ? Watch closely for changes in your health, and be sure to contact your doctor if: 
? · You have a new problem with your feet, such as: ¨ A new sore or ulcer. ¨ A break in the skin that is not healing after several days. ¨ Bleeding corns or calluses. ¨ An ingrown toenail. ? · You do not get better as expected. Where can you learn more? Go to http://georges.info/. Enter Y428 in the search box to learn more about \"Diabetic Neuropathy: Care Instructions. \" Current as of: March 13, 2017 Content Version: 11.4 © 1548-1737 [employer]. Care instructions adapted under license by [employer] (which disclaims liability or warranty for this information). If you have questions about a medical condition or this instruction, always ask your healthcare professional. [address] any warranty or liability for your use of this information. Introducing [facility]! [facility] introduces Videolicious patient portal. Now you can access parts of your medical record, email your doctor's office, and request medication refills online. 1. In your internet browser, go to [email or web address]. [employer] 2. Click on the First Time User? Click Here link in the Sign In box. You will see the New Member Sign Up page. 3. Enter your Videolicious Access Code exactly as it appears below. You will not need to use this code after youve completed the sign-up process. If you do not sign up before the expiration date, you must request a new code. · Videolicious Access Code: [email or web address] Expires: 5/10/2018 11:36 AM 
 
4. Enter the last four digits of your Social Security Number (xxxx) and Date of Birth (mm/dd/yyyy) as indicated and click Submit. You will be taken to the next sign-up page. 5. Create a Videolicious ID. This will be your Videolicious login ID and cannot be changed, so think of one that is secure and easy to remember. 6. Create a Videolicious password. You can change your password at any time. 7. Enter your Password Reset Question and Answer. This can be used at a later time if you forget your password. 8. Enter your e-mail address. You will receive e-mail notification when new information is available in [address] 9. Click Sign Up. You can now view and download portions of your medical record. 10. Click the Download Summary menu link to download a portable copy of your medical information. If you have questions, please visit the Frequently Asked Questions section of the [employer] website. Remember, [employer] is NOT to be used for urgent needs. For medical emergencies, dial 911. Now available from your iPhone and Android! Please provide this summary of care documentation to your next provider. Your primary care clinician is listed as Nancy Marquez. If you have any questions after today's visit, please call [phone].

## 2018-03-21 NOTE — PROGRESS NOTES
[facility]  Primary Care Office Visit - Problem-Oriented    : 1955   Naomi Prater is a 57 y.o. male presenting for  Chief Complaint   Patient presents with    Foot Pain     L foot pain since 3/3/18 (Hit foot on bed)    Hand Pain     Bilateral tingling. Increasing at night. Has lasted for 3 wks. Assessment/Plan:       ICD-10-CM   1. Diabetes mellitus type 2, noninsulin dependent (HCC) E11.9   2. Type 2 diabetes mellitus with diabetic neuropathy, without long-term current use of insulin (HCC) - neuropathy is a working diagnosis  - trial of gabapentin 100mg qhs  - otherwise DM is well controlled  - no change to current regimen  Key Antihyperglycemic Medications           metFORMIN (GLUCOPHAGE) 1,000 mg tablet  (Taking) TAKE 1 TABLET TWICE DAILY WITH MEALS         E11.40   3. Acute left ankle pain - s/p trauma on 3/3/2018  - relatively benign exam, given hx of inflammatory polyarthropathy  - reassurance given     M25.572   4. Hypertension - not well controlled today  - Mony has been given the following recommendations today due to his elevated BP reading: rescreen BP within a minimum of 2 months. - no change in current regimen for now  Key CAD CHF Meds           losartan-hydroCHLOROthiazide (HYZAAR) 50-12.5 mg per tablet  (Taking) TAKE 1 TABLET EVERY DAY    aspirin 81 mg tablet  (Taking) Take 1 Tab by mouth daily. Orders Placed This Encounter    metFORMIN (GLUCOPHAGE) 1,000 mg tablet     Sig: TAKE 1 TABLET TWICE DAILY WITH MEALS     Dispense:  180 Tab     Refill:  1    gabapentin (NEURONTIN) 100 mg capsule     Sig: Take 1 Cap by mouth nightly. Dispense:  90 Cap     Refill:  1     Spent 25min face-to-face, >50% spent on counseling & patient education. This document may have been created with the aid of dictation software. Text may contain errors, particularly phonetic errors.       Reviewed management plan & instructions with patient, who voiced understanding. Thomas Guerrier MD  Internal Medicine, Family Medicine & Sports Medicine  3/24/2018, 12:46 PM    Patient Instructions (provided in AVS): To Do:  · Start your gabapentin 100mg before bed to help the tingling funny feelings in your hands and feet. If it helps, but you think you want to take more, just call [facility] and let us know, so I can help change your prescription so you don't run out. · Use the paper prescription for metformin if you think it is going to take humana too long to get your prescription to your house. · Keep an eye on your left ankle. If it starts getting in the way of the activities you want to be doing, let me know, and we could THEN check an x-ray (no need for one right now). · We will just let your blood pressure \"ride\" for now. .. If you want, keep an eye on it at home       Diabetic Neuropathy: Care Instructions      History:   Man Garcia. is a 57 y.o. male presenting to address:  Chief Complaint   Patient presents with    Foot Pain     L foot pain since 3/3/18 (Hit foot on bed)    Hand Pain     Bilateral tingling. Increasing at night. Has lasted for 3 wks. Having problems with hands & feet again, used to be once in a while, but worse prior to bed. \"stinging tingling feeling. \" worse over last 3 weeks. No loss of strength. Hit left ankle on bed 3/2/2018 when trying to get into bed at a hotel. It was an object protruding from bed. Still sore, and seems to be worse with cooler weather. Initially was difficult to bear weight, but it was possible. Not limiting his activities now. \"but just wanted to get it checked out\". Otherwise is doing okay. Still going to the gym and staying as active as he possibly can. Compliant with his medications, and denies any AE.       Past Medical History:   Diagnosis Date    Diabetes ([facility]     H. pylori infection     s/p prevpack    H/O esophagogastroduodenoscopy 3/1/2013    irregular z-line, small 1cm submucosal nodule    Hypercholesterolemia     Hypertension     Rheumatoid arthritis(714.0)      Past Surgical History:   Procedure Laterality Date    ENDOSCOPY, COLON, DIAGNOSTIC      HX HERNIA REPAIR      HX WISDOM TEETH EXTRACTION        reports that he has quit smoking. He has never used smokeless tobacco. He reports that he does not drink alcohol or use illicit drugs. Social History     Social History Narrative     History   Smoking Status    Former Smoker   Smokeless Tobacco    Never Used     Family History   Problem Relation Age of Onset    Heart Disease Mother     Heart Disease Father    [facility] Arthritis-rheumatoid Sister     Hypertension Sister     Heart Disease Sister     Heart Disease Brother      Allergies   Allergen Reactions    Statins-Hmg-Coa Reductase Inhibitors Myalgia       Problem List:      Patient Active Problem List    Diagnosis    Essential hypertension     - meds: losartan/HCTZ 50/12.5        Diabetes mellitus type 2, noninsulin dependent ([facility]     - meds: metformin 1000mg BID    - A1c 7.5 (Nov 2015)  - A1c 6.5 (4/14/2015)    - foot exam: 7/8/2014    Intolerant of statins.           Rheumatoid arthritis ([facility]     -1/16/2017 [Dr. Darrion Huerta: seropositive long-standing disease on arava monotherapy, reporting RAPID3 16.3; sicca syndrome; r/o Sjogrens, labs, possibly US to eval disease activity    -4/26/2016: [Rheum] refilled leflunomide 20 mg daily, noted that he would be switching to a rheumatologist closer to home  -2/17/2016: continue on leflunomide, plaquenil, labs: cbc, ESR, CMP, CRP, B hand xrays;  follow up 2 mo    Previously followed by Dr. Rusty Guerra (rheum)    -- 9/29/2014 [rheum]: cont humira, plaquenil & tramadol; stop prednisone; flu shot given  -- 7/29/2014 [rheum]: cont humira & plaquenil; reduce prednisone to 2.5mg daily  -- 4/29/2014 [rheum]: combo of RA & DJD; continue humira, plaquenil & prednisone 5mg daily; added tramadol qhs prn; avoid NSAIDs; recs: check neck US to rule out lymph node swelling (R side, above clavicle)  -- Mar 2013: RF = 588      Hyperlipidemia with target LDL less than 100     - statin intolerant    -- tot 258 (H), HDL 45, , LDLc 186 (H) (1/10/2015); 10yr ASCVD risk 22.5%  -- tot 146, , HDL 51, LDLc 72 (7/8/2014)  -- Sept 2013: tot 139, LDL 76, HDL 42,       Chronic gastritis    Esophagitis     Chronic esophgitis by bx with irregular z-line (Mar 2013) by Dr. Mily Quick (DL[facility]).  DDD (degenerative disc disease), lumbosacral     L-spine XR (Dec 2016): Mild L4-S1 degenerative endplate reactive changes and disc space height loss. Moderate bilateral facet arthropathy L4-S1. Bilateral oblique views demonstrate moderate right and mild left L5-S1 foraminal stenoses.  Statin intolerance    Advance care planning    Right thyroid nodule     Followed by Dr. Sally Hikcs (endo).     -- 6/10/2014 [endo]: FNA Bx negative for malignancy      Vitamin D deficiency     - VitD 34.4 (7/8/2014)    *5/7/2014: VitD 14.6 (L)  - start 50k 2x/wk      Elevated TSH    Decreased libido     -- 3/4/2014: slightly low free testosterone, normal PSA, but slightly elevated TSH      TERRY (obstructive sleep apnea)     Followed by Dr. Marylu MACE [facility])  -- 12/17/2013: overnight sleep study pending        Myalgia     *10/9/2014: improved off of januvia  *5/7/2014: sx unchanged off crestor    -- 3/4/2014: continue to hold crestor; advised massage & stretching  --  (2/21/2014); holding crestor until next appt  -- aldolase wnl (2/21/2014)      Routine health maintenance     -- c-scope: 4/6/2012 by Andrew Sousa ([address]), hyperplastic polyps; next due in 10 yrs (Apr 2022)      Neural foraminal stenosis of cervical spine     C-spine MRI (Apr 2013):  - focal R paracentral disc protrusion @ C3-4 with mild cord impingement  - mild to mod foraminal stenoses, primarily @ C3-4 and C4-5 on right  - no C1-2 rheumatoid instability      Hemoglobin C trait (HCC)     Seen on Hgb electrophoresis on 5/29/2013. Medications:     Current Outpatient Prescriptions   Medication Sig    metFORMIN (GLUCOPHAGE) 1,000 mg tablet TAKE 1 TABLET TWICE DAILY WITH MEALS    gabapentin (NEURONTIN) 100 mg capsule Take 1 Cap by mouth nightly.  ergocalciferol (ERGOCALCIFEROL) 50,000 unit capsule Take 1 Cap by mouth every seven (7) days.  Blood-Glucose Meter (ACCU-CHEK IDANIA) misc 1 Each by Does Not Apply route daily.  losartan-hydroCHLOROthiazide (HYZAAR) 50-12.5 mg per tablet TAKE 1 TABLET EVERY DAY    fluticasone (FLONASE) 50 mcg/actuation nasal spray 2 Sprays by Both Nostrils route daily.  ACCU-CHEK SMARTVIEW TEST STRIP strip CHECK BLOOD SUGAR TWICE DAILY    traMADol (ULTRAM) 50 mg tablet TAKE 1 TABLET EVERY 8 HOURS AS NEEDED FOR PAIN  ( MAXIMUM DAILY AMOUNT:  150MG  )    leflunomide (ARAVA) 20 mg tablet Take 1 Tab by mouth daily.  ACCU-CHEK SMARTVIEW CONTRL SOL soln USE AS DIRECTED    acetaminophen (TYLENOL) 500 mg tablet 500 mg as needed.  red yeast rice extract 600 mg cap Take 1,800 mg by mouth daily.  aspirin 81 mg tablet Take 1 Tab by mouth daily. No current facility-administered medications for this visit. Review of Systems:     Review of Systems   Constitutional: Positive for malaise/fatigue. Negative for chills and fever. HENT: Negative for ear pain and sore throat. Respiratory: Negative for cough and shortness of breath. Cardiovascular: Negative for chest pain and palpitations. Gastrointestinal: Negative for abdominal pain and heartburn. Genitourinary: Negative for dysuria. Musculoskeletal: Positive for joint pain and myalgias. Skin: Negative for rash. Neurological: Positive for tingling. Negative for sensory change, speech change, focal weakness and headaches. Endo/Heme/Allergies: Does not bruise/bleed easily. Psychiatric/Behavioral: Negative for depression.  The patient is not nervous/anxious and does not have insomnia. Physical Assessment:   VS:    Vitals:    03/21/18 1201 03/21/18 1316   BP: (!) 174/99 (!) 158/96   Pulse: 81    Resp: 18    Temp: 97 °F (36.1 °C)    TempSrc: Oral    SpO2: 96%    Weight: 180 lb 6.4 oz (81.8 kg)    Height: 5' 9\" (1.753 m)    PainSc:   0 - No pain        Physical Exam   Constitutional: He is oriented to person, place, and time. He appears well-developed and well-nourished. No distress. HENT:   Head: Normocephalic and atraumatic. Right Ear: External ear normal.   Left Ear: External ear normal.   Mouth/Throat: Oropharynx is clear and moist.   Eyes: EOM are normal. Pupils are equal, round, and reactive to light. Neck: Normal range of motion. Neck supple. Cardiovascular: Normal rate, regular rhythm and normal heart sounds. No murmur heard. Pulmonary/Chest: Effort normal and breath sounds normal. No respiratory distress. He has no wheezes. Musculoskeletal:        Feet:    Mild bilateral lateral ankle tenderness. No overt bruising or swelling noted. Bilateral ankle strength intact. Neurological: He is alert and oriented to person, place, and time. No cranial nerve deficit. Skin: Skin is warm and dry. He is not diaphoretic. Psychiatric: He has a normal mood and affect. His behavior is normal. Judgment and thought content normal.   Nursing note reviewed.

## 2018-03-25 PROBLEM — E11.40 TYPE 2 DIABETES MELLITUS WITH DIABETIC NEUROPATHY (HCC): Status: ACTIVE | Noted: 2018-03-25

## 2018-05-11 ENCOUNTER — OFFICE VISIT (OUTPATIENT)
Dept: FAMILY MEDICINE CLINIC | Age: 63
End: 2018-05-11

## 2018-05-11 ENCOUNTER — HOSPITAL ENCOUNTER (OUTPATIENT)
Dept: LAB | Age: 63
Discharge: HOME | End: 2018-05-11

## 2018-05-11 VITALS
RESPIRATION RATE: 17 BRPM | SYSTOLIC BLOOD PRESSURE: 130 MMHG | HEIGHT: 69 IN | HEART RATE: 84 BPM | WEIGHT: 191.4 LBS | DIASTOLIC BLOOD PRESSURE: 89 MMHG | BODY MASS INDEX: 28.35 KG/M2 | TEMPERATURE: 97.8 F | OXYGEN SATURATION: 97 %

## 2018-05-11 DIAGNOSIS — E78.5 HYPERLIPIDEMIA WITH TARGET LDL LESS THAN 100: Chronic | ICD-10-CM

## 2018-05-11 DIAGNOSIS — I10 ESSENTIAL HYPERTENSION: Chronic | ICD-10-CM

## 2018-05-11 DIAGNOSIS — E11.40 TYPE 2 DIABETES MELLITUS WITH DIABETIC NEUROPATHY, WITHOUT LONG-TERM CURRENT USE OF INSULIN (HCC): ICD-10-CM

## 2018-05-11 DIAGNOSIS — Z78.9 STATIN INTOLERANCE: ICD-10-CM

## 2018-05-11 DIAGNOSIS — E55.9 VITAMIN D DEFICIENCY: ICD-10-CM

## 2018-05-11 DIAGNOSIS — Z23 ENCOUNTER FOR IMMUNIZATION: ICD-10-CM

## 2018-05-11 DIAGNOSIS — K20.90 ESOPHAGITIS: Chronic | ICD-10-CM

## 2018-05-11 DIAGNOSIS — E11.9 DIABETES MELLITUS TYPE 2, NONINSULIN DEPENDENT (HCC): Primary | Chronic | ICD-10-CM

## 2018-05-11 PROCEDURE — 99001 SPECIMEN HANDLING PT-LAB: CPT | Performed by: FAMILY MEDICINE

## 2018-05-11 RX ORDER — LOSARTAN POTASSIUM AND HYDROCHLOROTHIAZIDE 12.5; 5 MG/1; MG/1
1 TABLET ORAL DAILY
Qty: 90 TAB | Refills: 3 | Status: SHIPPED | OUTPATIENT
Start: 2018-05-11 | End: 2018-10-26 | Stop reason: DRUGHIGH

## 2018-05-11 RX ORDER — GABAPENTIN 100 MG/1
200 CAPSULE ORAL
Qty: 180 CAP | Refills: 1 | Status: SHIPPED | OUTPATIENT
Start: 2018-05-11 | End: 2019-01-21 | Stop reason: SDUPTHER

## 2018-05-11 NOTE — MR AVS SNAPSHOT
Kizzy Abarca 
 
 
 [address] [facility] [id number] [phone] Patient: Chance Mattson Sr. MRN: [id number] Visit Information Date & Time Provider Department Dept. Phone Encounter #  
 5/11/2018 10:00 AM Zaria Sagastume, Deanna Santos [place] [phone] [id number] Follow-up Instructions Return in about 3 months (around 8/11/2018) for 20min follow-up. Upcoming Health Maintenance Date Due  
 EYE EXAM RETINAL OR DILATED Q1 6/5/2018 Influenza Age 5 to Adult 8/1/2018 HEMOGLOBIN A1C Q6M 8/9/2018 FOOT EXAM Q1 11/10/2018 MICROALBUMIN Q1 11/10/2018 LIPID PANEL Q1 2/9/2019 MEDICARE YEARLY EXAM 2/10/2019 GLAUCOMA SCREENING Q2Y 6/5/2019 COLONOSCOPY 4/6/2022 DTaP/Tdap/Td series (3 - Td) 4/20/2025 Allergies as of 5/11/2018  Review Complete On: 5/11/2018 By: Zaria Sagastume MD  
  
 Severity Noted Reaction Type Reactions Statins-hmg-coa Reductase Inhibitors Medium 04/20/2015    Myalgia Current Immunizations  Reviewed on 4/20/2015 Name Date Influenza Vaccine 9/8/2016 10:01 AM, 11/23/2015 10:01 AM, 9/29/2014, 10/16/2013 12:00 AM, 10/18/2012 Influenza Vaccine (Quad) PF 9/6/2017 10:16 AM  
 Pneumococcal Conjugate (PCV-13) 11/17/2016 Pneumococcal Polysaccharide (PPSV-23) 5/11/2018 10:05 AM, 8/11/2009 12:00 AM  
 TB Skin Test (PPD) Intradermal 2/19/2013 Tdap 4/20/2015 10:12 AM, 2/19/2009 12:00 AM  
 Zoster Vaccine, Live 10/17/2016 Not reviewed this visit You Were Diagnosed With   
  
 Codes Comments Encounter for immunization    -  Primary ICD-10-CM: Y53 ICD-9-CM: V03.89 Diabetes mellitus type 2, noninsulin dependent ([facility]     ICD-10-CM: E11.9 ICD-9-CM: 250.00 Essential hypertension     ICD-10-CM: I10 
ICD-9-CM: 401.9 Hyperlipidemia with target LDL less than 100     ICD-10-CM: E78.5 ICD-9-CM: 272.4 Esophagitis     ICD-10-CM: K20.9 ICD-9-CM: 530.10 Type 2 diabetes mellitus with diabetic neuropathy, without long-term current use of insulin (HCC)     ICD-10-CM: E11.40 ICD-9-CM: 250.60, 357.2 Statin intolerance     ICD-10-CM: Z78.9 ICD-9-CM: 995.27 Vitamin D deficiency     ICD-10-CM: E55.9 ICD-9-CM: 268.9 Vitals BP Pulse Temp Resp Height(growth percentile) Weight(growth percentile) 130/89 (BP 1 Location: Right arm, BP Patient Position: Sitting) 84 97.8 °F (36.6 °C) (Oral) 17 5' 9\" (1.753 m) 191 lb 6.4 oz (86.8 kg) SpO2 BMI Smoking Status 97% 28.26 kg/m2 Former Smoker Vitals History BMI and BSA Data Body Mass Index Body Surface Area  
 28.26 kg/m 2 2.06 m 2 Preferred Pharmacy Pharmacy Name Phone [address] [address] [phone] Your Updated Medication List  
  
   
This list is accurate as of 5/11/18 10:26 AM.  Always use your most recent med list.  
  
  
  
  
 ACCU-CHEK SMARTVIEW CONTRL SOL Soln Generic drug:  Blood Glucose Control, Normal  
USE AS DIRECTED ACCU-CHEK SMARTVIEW TEST STRIP strip Generic drug:  glucose blood VI test strips CHECK BLOOD SUGAR TWICE DAILY  
  
 acetaminophen 500 mg tablet Commonly known as:  TYLENOL  
500 mg as needed. aspirin 81 mg tablet Take 1 Tab by mouth daily. Blood-Glucose Meter Misc Commonly known as:  ACCU-CHEK IDANIA  
1 Each by Does Not Apply route daily. ergocalciferol 50,000 unit capsule Commonly known as:  ERGOCALCIFEROL Take 1 Cap by mouth every seven (7) days. fluticasone 50 mcg/actuation nasal spray Commonly known as:  Beversam Maker 2 Sprays by Both Nostrils route daily. gabapentin 100 mg capsule Commonly known as:  NEURONTIN Take 2 Caps by mouth nightly. leflunomide 20 mg tablet Commonly known as:  Yi Lords Take 1 Tab by mouth daily. losartan-hydroCHLOROthiazide 50-12.5 mg per tablet Commonly known as:  HYZAAR Take 1 Tab by mouth daily. metFORMIN 1,000 mg tablet Commonly known as:  GLUCOPHAGE  
TAKE 1 TABLET TWICE DAILY WITH MEALS  
  
 red yeast rice extract 600 mg Cap Take 1,800 mg by mouth daily. traMADol 50 mg tablet Commonly known as:  ULTRAM  
TAKE 1 TABLET EVERY 8 HOURS AS NEEDED FOR PAIN  ( MAXIMUM DAILY AMOUNT:  150MG  ) Prescriptions Sent to Pharmacy Refills  
 gabapentin (NEURONTIN) 100 mg capsule 1 Sig: Take 2 Caps by mouth nightly. Class: Normal  
 Pharmacy: [address] [place] [address] Ph #: [phone] Route: Oral  
 losartan-hydroCHLOROthiazide (HYZAAR) 50-12.5 mg per tablet 3 Sig: Take 1 Tab by mouth daily. Class: Normal  
 Pharmacy: [address] [place] [address] Ph #: [phone] Route: Oral  
  
We Performed the Following ADMIN PNEUMOCOCCAL VACCINE [ HCP] PNEUMOCOCCAL POLYSACCHARIDE VACCINE, 23-VALENT, ADULT OR IMMUNOSUPPRESSED PT DOSE, [58090 CPT(R)] Follow-up Instructions Return in about 3 months (around 8/11/2018) for 20min follow-up. To-Do List   
 05/11/2018 Lab:  HEMOGLOBIN A1C WITH EAG   
  
 05/11/2018 Lab:  METABOLIC PANEL, COMPREHENSIVE   
  
 05/11/2018 Lab:  VITAMIN D, 25 HYDROXY Patient Instructions To Do: · Stop by the lab on the way out for bloodwork Notes from your doctor: · Ask Dr. BURRIS [facility] if you can get your bloodwork done with me if it is \"not super special tests\". If so, just drop off the lab slip to me, and we will call you once I have the orders in the system (it takes me a bit to get them into our computer system) Introducing [facility] & HEALTH [facility]! [employer] introduces [email or web address] patient portal. Now you can access parts of your medical record, email your doctor's office, and request medication refills online.    
 
1. In your internet browser, go to [email or web address] 2. Click on the First Time User? Click Here link in the Sign In box. You will see the New Member Sign Up page. 3. Enter your CareSimply Access Code exactly as it appears below. You will not need to use this code after youve completed the sign-up process. If you do not sign up before the expiration date, you must request a new code. · CareSimply Access Code: [email or web address] Expires: 8/9/2018 10:25 AM 
 
4. Enter the last four digits of your Social Security Number (xxxx) and Date of Birth (mm/dd/yyyy) as indicated and click Submit. You will be taken to the next sign-up page. 5. Create a Starmountt ID. This will be your CareSimply login ID and cannot be changed, so think of one that is secure and easy to remember. 6. Create a CareSimply password. You can change your password at any time. 7. Enter your Password Reset Question and Answer. This can be used at a later time if you forget your password. 8. Enter your e-mail address. You will receive e-mail notification when new information is available in [address] 9. Click Sign Up. You can now view and download portions of your medical record. 10. Click the Download Summary menu link to download a portable copy of your medical information. If you have questions, please visit the Frequently Asked Questions section of the CareSimply website. Remember, CareSimply is NOT to be used for urgent needs. For medical emergencies, dial 911. Now available from your iPhone and Android! Please provide this summary of care documentation to your next provider. Your primary care clinician is listed as Peace Garcia. If you have any questions after today's visit, please call [phone].

## 2018-05-11 NOTE — PROGRESS NOTES
[facility] [address] [facility]  Primary Care Office Visit - Problem-Oriented    : 1955   Majo Cast is a 70 y.o. male presenting for  Chief Complaint   Patient presents with    Hypertension    Diabetes       Assessment/Plan:     1. Diabetes mellitus type 2, noninsulin dependent ([address]  2. Type 2 diabetes mellitus with diabetic neuropathy, without long-term current use of insulin ([address]  Relatively well-controlled. No change current medications.  - HEMOGLOBIN A1C WITH EAG; Future  - METABOLIC PANEL, COMPREHENSIVE; Future  Key Antihyperglycemic Medications             metFORMIN (GLUCOPHAGE) 1,000 mg tablet  (Taking) TAKE 1 TABLET TWICE DAILY WITH MEALS          3. Essential hypertension  Well-controlled. No changes to current medications  - losartan-hydroCHLOROthiazide (HYZAAR) 50-12.5 mg per tablet; Take 1 Tab by mouth daily. Dispense: 90 Tab; Refill: 3  - METABOLIC PANEL, COMPREHENSIVE; Future  [place] CAD CHF Meds             losartan-hydroCHLOROthiazide (HYZAAR) 50-12.5 mg per tablet  (Taking) Take 1 Tab by mouth daily. aspirin 81 mg tablet  (Taking) Take 1 Tab by mouth daily. 4. Hyperlipidemia with target LDL less than 100  5. Statin intolerance  Not well controlled. Future considerations: Extremely low-dose every other day statin?  - METABOLIC PANEL, COMPREHENSIVE; Future  Key Antihyperlipidemia Meds     The patient is on no antihyperlipidemia meds. 6. Esophagitis  Stable. Continue monitoring. 7. Vitamin D deficiency  Stable. Continue weekly ergocalciferol maintenance. - VITAMIN D, 25 HYDROXY; Future      8.  Encounter for immunization  - Pneumococcal Polysaccharide vaccine, 23-Valent, Adult or Immunocompromised  - ADMIN PNEUMOCOCCAL VACCINE  Medicare Injection Admin Charge        Orders Placed This Encounter    Pneumococcal Polysaccharide vaccine, 23-Valent, Adult or Immunocompromised    HEMOGLOBIN A1C WITH EAG     Standing Status:   Future     Number of Occurrences:   1     Standing Expiration Date:   5/12/2019    VITAMIN D, 25 HYDROXY     Standing Status:   Future     Number of Occurrences:   1     Standing Expiration Date:   0/48/1427    METABOLIC PANEL, COMPREHENSIVE     Standing Status:   Future     Number of Occurrences:   1     Standing Expiration Date:   5/12/2019    ADMIN PNEUMOCOCCAL VACCINE  Medicare Injection Admin Charge    gabapentin (NEURONTIN) 100 mg capsule     Sig: Take 2 Caps by mouth nightly. Dispense:  180 Cap     Refill:  1    losartan-hydroCHLOROthiazide (HYZAAR) 50-12.5 mg per tablet     Sig: Take 1 Tab by mouth daily. Dispense:  90 Tab     Refill:  3         This document may have been created with the aid of dictation software. Text may contain errors, particularly phonetic errors. Reviewed management plan & instructions with patient, who voiced understanding. Monica Stanley MD  Internal Medicine, Family Medicine & Sports Medicine  5/11/2018, 9:55 AM       Patient Instructions     To Do: · Stop by the lab on the way out for bloodwork      Notes from your doctor:  · Ask Dr. Ary Macedo if you can get your bloodwork done with me if it is \"not super special tests\". If so, just drop off the lab slip to me, and we will call you once I have the orders in the system (it takes me a bit to get them into our computer system)           History:   Robinson Vilchis. is a 70 y.o. male presenting to address:  Chief Complaint   Patient presents with    Hypertension    Diabetes       Noted some improvement in neuropathy with gabapentin, however feels as though could benefit from higher dose. Feeling \"not so great\" recently, however likely 2/2 to recent stormy weather. Going to [place] for granddaughter's high school graduation. Is compliant with medications, denies any adverse effects.     Past Medical History:   Diagnosis Date    Diabetes ([address]     H. pylori infection     s/p prevpack    H/O esophagogastroduodenoscopy 3/1/2013    irregular z-line, small 1cm submucosal nodule    Hypercholesterolemia     Hypertension     Rheumatoid arthritis(714.0)      Past Surgical History:   Procedure Laterality Date    ENDOSCOPY, COLON, DIAGNOSTIC      HX HERNIA REPAIR      HX WISDOM TEETH EXTRACTION        reports that he has quit smoking. He has never used smokeless tobacco. He reports that he does not drink alcohol or use illicit drugs. Social History     Social History Narrative     History   Smoking Status    Former Smoker   Smokeless Tobacco    Never Used     Family History   Problem Relation Age of Onset    Heart Disease Mother     Heart Disease Father    Patricia Garcia Arthritis-rheumatoid Sister     Hypertension Sister     Heart Disease Sister     Heart Disease Brother      Allergies   Allergen Reactions    Statins-Hmg-Coa Reductase Inhibitors Myalgia       Problem List:      Patient Active Problem List    Diagnosis    Essential hypertension     - meds: losartan/HCTZ 50/12.5        Diabetes mellitus type 2, noninsulin dependent ([facility]     - meds: metformin 1000mg BID    - A1c 7.5 (Nov 2015)  - A1c 6.5 (4/14/2015)    - foot exam: 7/8/2014    Intolerant of statins.           Rheumatoid arthritis ([facility]     -1/16/2017 [Dr. Julio [place]: seropositive long-standing disease on arava monotherapy, reporting RAPID3 16.3; sicca syndrome; r/o Sjogrens, labs, possibly US to eval disease activity    -4/26/2016: [Rheum] refilled leflunomide 20 mg daily, noted that he would be switching to a rheumatologist closer to home  -2/17/2016: continue on leflunomide, plaquenil, labs: cbc, ESR, CMP, CRP, B hand xrays;  follow up 2 mo    Previously followed by Dr. Charles Weinberg (rheum)    -- 9/29/2014 [rheum]: cont humira, plaquenil & tramadol; stop prednisone; flu shot given  -- 7/29/2014 [rheum]: cont humira & plaquenil; reduce prednisone to 2.5mg daily  -- 4/29/2014 [rheum]: combo of RA & DJD; continue humira, plaquenil & prednisone 5mg daily; added tramadol qhs prn; avoid NSAIDs; recs: check neck US to rule out lymph node swelling (R side, above clavicle)  -- Mar 2013: RF = 588      Hyperlipidemia with target LDL less than 100     - statin intolerant    -- tot 258 (H), HDL 45, , LDLc 186 (H) (1/10/2015); 10yr ASCVD risk 22.5%  -- tot 146, , HDL 51, LDLc 72 (7/8/2014)  -- Sept 2013: tot 139, LDL 76, HDL 42,       Chronic gastritis    Esophagitis     Chronic esophgitis by bx with irregular z-line (Mar 2013) by Dr. Omari Haley ([facility]).  Type 2 diabetes mellitus with diabetic neuropathy ([facility]    DDD (degenerative disc disease), lumbosacral     L-spine XR (Dec 2016): Mild L4-S1 degenerative endplate reactive changes and disc space height loss. Moderate bilateral facet arthropathy L4-S1. Bilateral oblique views demonstrate moderate right and mild left L5-S1 foraminal stenoses.  Statin intolerance    Advance care planning    Right thyroid nodule     Followed by Dr. Jamie Herrera (endo).     -- 6/10/2014 [endo]: FNA Bx negative for malignancy      Vitamin D deficiency     - VitD 34.4 (7/8/2014)    *5/7/2014: VitD 14.6 (L)  - start 50k 2x/wk      Elevated TSH    Decreased libido     -- 3/4/2014: slightly low free testosterone, normal PSA, but slightly elevated TSH      TERRY (obstructive sleep apnea)     Followed by Dr. Drake [facility])  -- 12/17/2013: overnight sleep study pending        Myalgia     *10/9/2014: improved off of januvia  *5/7/2014: sx unchanged off crestor    -- 3/4/2014: continue to hold crestor; advised massage & stretching  --  (2/21/2014); holding crestor until next appt  -- aldolase wnl (2/21/2014)      Routine health maintenance     -- c-scope: 4/6/2012 by Norman Patel ([address]), hyperplastic polyps; next due in 10 yrs (Apr 2022)      Neural foraminal stenosis of cervical spine     C-spine MRI (Apr 2013):  - focal R paracentral disc protrusion @ C3-4 with mild cord impingement  - mild to mod foraminal stenoses, primarily @ C3-4 and C4-5 on right  - no C1-2 rheumatoid instability      Hemoglobin C trait (HCC)     Seen on Hgb electrophoresis on 5/29/2013. Medications:     Current Outpatient Prescriptions   Medication Sig    metFORMIN (GLUCOPHAGE) 1,000 mg tablet TAKE 1 TABLET TWICE DAILY WITH MEALS    gabapentin (NEURONTIN) 100 mg capsule Take 1 Cap by mouth nightly.  ergocalciferol (ERGOCALCIFEROL) 50,000 unit capsule Take 1 Cap by mouth every seven (7) days.  Blood-Glucose Meter (ACCU-CHEK IDANIA) misc 1 Each by Does Not Apply route daily.  losartan-hydroCHLOROthiazide (HYZAAR) 50-12.5 mg per tablet TAKE 1 TABLET EVERY DAY    fluticasone (FLONASE) 50 mcg/actuation nasal spray 2 Sprays by Both Nostrils route daily.  ACCU-CHEK SMARTVIEW TEST STRIP strip CHECK BLOOD SUGAR TWICE DAILY    traMADol (ULTRAM) 50 mg tablet TAKE 1 TABLET EVERY 8 HOURS AS NEEDED FOR PAIN  ( MAXIMUM DAILY AMOUNT:  150MG  )    leflunomide (ARAVA) 20 mg tablet Take 1 Tab by mouth daily.  ACCU-CHEK SMARTVIEW CONTRL SOL soln USE AS DIRECTED    acetaminophen (TYLENOL) 500 mg tablet 500 mg as needed.  red yeast rice extract 600 mg cap Take 1,800 mg by mouth daily.  aspirin 81 mg tablet Take 1 Tab by mouth daily. No current facility-administered medications for this visit. Review of Systems:     Review of Systems   Constitutional: Positive for malaise/fatigue. Negative for chills and fever. HENT: Negative for ear pain and sore throat. Respiratory: Negative for cough and shortness of breath. Cardiovascular: Negative for chest pain and palpitations. Gastrointestinal: Negative for abdominal pain and heartburn. Genitourinary: Negative for dysuria. Musculoskeletal: Positive for joint pain and myalgias. Skin: Negative for rash. Neurological: Positive for tingling. Negative for sensory change, speech change, focal weakness and headaches. Endo/Heme/Allergies: Does not bruise/bleed easily. Psychiatric/Behavioral: Negative for depression. The patient is not nervous/anxious and does not have insomnia. Physical Assessment:   VS:    Vitals:    05/11/18 0959   BP: 130/89   Pulse: 84   Resp: 17   Temp: 97.8 °F (36.6 °C)   TempSrc: Oral   SpO2: 97%   Weight: 191 lb 6.4 oz (86.8 kg)   Height: 5' 9\" (1.753 m)   PainSc:   3   PainLoc: Back       Physical Exam   Constitutional: He is oriented to person, place, and time. He appears well-developed and well-nourished. No distress. HENT:   Head: Normocephalic and atraumatic. Right Ear: External ear normal.   Left Ear: External ear normal.   Mouth/Throat: Oropharynx is clear and moist.   Eyes: EOM are normal. Pupils are equal, round, and reactive to light. Neck: Normal range of motion. Neck supple. Cardiovascular: Normal rate, regular rhythm and normal heart sounds. No murmur heard. Pulmonary/Chest: Effort normal and breath sounds normal. No respiratory distress. He has no wheezes. Musculoskeletal: Normal range of motion. Neurological: He is alert and oriented to person, place, and time. No cranial nerve deficit. Skin: Skin is warm and dry. He is not diaphoretic. Psychiatric: He has a normal mood and affect. His behavior is normal. Judgment and thought content normal.   Nursing note reviewed. Recent Labs & Imaging:     No visits with results within 3 Month(s) from this visit.   Latest known visit with results is:    Office Visit on 02/09/2018   Component Date Value Ref Range Status    Prostate Specific Ag 02/09/2018 0.9  0.0 - 4.0 ng/mL Final    Cholesterol, total 02/09/2018 261* 100 - 199 mg/dL Final    Triglyceride 02/09/2018 160* 0 - 149 mg/dL Final    HDL Cholesterol 02/09/2018 43  >39 mg/dL Final    VLDL, calculated 02/09/2018 32  5 - 40 mg/dL Final    LDL, calculated 02/09/2018 186* 0 - 99 mg/dL Final    TSH 02/09/2018 1.490  0.450 - 4.500 uIU/mL Final    VITAMIN D, 25-HYDROXY 02/09/2018 37.1  30.0 - 100.0 ng/mL Final    Glucose 02/09/2018 95  65 - 99 mg/dL Final    BUN 02/09/2018 9  8 - 27 mg/dL Final    Creatinine 02/09/2018 0.81  0.76 - 1.27 mg/dL Final    GFR est non-AA 02/09/2018 94  >59 mL/min/1.73 Final    GFR est AA 02/09/2018 109  >59 mL/min/1.73 Final    BUN/Creatinine ratio 02/09/2018 11  10 - 24 Final    Sodium 02/09/2018 142  134 - 144 mmol/L Final    Potassium 02/09/2018 4.4  3.5 - 5.2 mmol/L Final    Chloride 02/09/2018 98  96 - 106 mmol/L Final    CO2 02/09/2018 26  18 - 29 mmol/L Final    Calcium 02/09/2018 9.6  8.6 - 10.2 mg/dL Final    Protein, total 02/09/2018 7.4  6.0 - 8.5 g/dL Final    Albumin 02/09/2018 4.4  3.6 - 4.8 g/dL Final    GLOBULIN, TOTAL 02/09/2018 3.0  1.5 - 4.5 g/dL Final    A-G Ratio 02/09/2018 1.5  1.2 - 2.2 Final    Bilirubin, total 02/09/2018 0.5  0.0 - 1.2 mg/dL Final    Alk. phosphatase 02/09/2018 89  39 - 117 IU/L Final    AST (SGOT) 02/09/2018 16  0 - 40 IU/L Final    ALT (SGPT) 02/09/2018 21  0 - 44 IU/L Final    Hemoglobin A1c 02/09/2018 6.3* 4.8 - 5.6 % Final    Comment:          Pre-diabetes: 5.7 - 6.4           Diabetes: >6.4           Glycemic control for adults with diabetes: <7.0      Estimated average glucose 02/09/2018 134  mg/dL Final    WBC 02/09/2018 5.2  3.4 - 10.8 x10E3/uL Final    RBC 02/09/2018 5.42  4.14 - 5.80 x10E6/uL Final    HGB 02/09/2018 13.8  13.0 - 17.7 g/dL Final    HCT 02/09/2018 40.7  37.5 - 51.0 % Final    MCV 02/09/2018 75* 79 - 97 fL Final    MCH 02/09/2018 25.5* 26.6 - 33.0 pg Final    MCHC 02/09/2018 33.9  31.5 - 35.7 g/dL Final    RDW 02/09/2018 15.2  12.3 - 15.4 % Final    PLATELET 93/91/9504 872  150 - 379 x10E3/uL Final    NEUTROPHILS 02/09/2018 48  Not Estab. % Final    Lymphocytes 02/09/2018 31  Not Estab. % Final    MONOCYTES 02/09/2018 13  Not Estab. % Final    EOSINOPHILS 02/09/2018 7  Not Estab. % Final    BASOPHILS 02/09/2018 1  Not Estab. % Final    ABS. NEUTROPHILS 02/09/2018 2.5  1.4 - 7.0 x10E3/uL Final    Abs Lymphocytes 02/09/2018 1.6  0.7 - 3.1 x10E3/uL Final    ABS. MONOCYTES 02/09/2018 0.7  0.1 - 0.9 x10E3/uL Final    ABS. EOSINOPHILS 02/09/2018 0.4  0.0 - 0.4 x10E3/uL Final    ABS. BASOPHILS 02/09/2018 0.0  0.0 - 0.2 x10E3/uL Final    IMMATURE GRANULOCYTES 02/09/2018 0  Not Estab. % Final    ABS. IMM. GRANS. 02/09/2018 0.0  0.0 - 0.1 x10E3/uL Final    Creatine Kinase,Total 02/09/2018 153  24 - 204 U/L Final    INTERPRETATION 02/09/2018 Note   Final    Supplemental report is available.     PDF Image 13/19/2118 Not applicable   Final

## 2018-05-11 NOTE — PROGRESS NOTES
Ally Torres is a 70 y.o. male (: 1955) presenting to address:    Chief Complaint   Patient presents with    Hypertension    Diabetes       Vitals:    18 0959   BP: 130/89   Pulse: 84   Resp: 17   Temp: 97.8 °F (36.6 °C)   TempSrc: Oral   SpO2: 97%   Weight: 191 lb 6.4 oz (86.8 kg)   Height: 5' 9\" (1.753 m)   PainSc:   3   PainLoc: Back       Hearing/Vision:   No exam data present    Learning Assessment:     Learning Assessment 2014   PRIMARY LEARNER Patient   HIGHEST LEVEL OF EDUCATION - PRIMARY LEARNER  SOME COLLEGE   BARRIERS PRIMARY LEARNER NONE   CO-LEARNER CAREGIVER No   PRIMARY LANGUAGE ENGLISH   LEARNER PREFERENCE PRIMARY OTHER (COMMENT)   ANSWERED BY Patient   RELATIONSHIP SELF     Depression Screening:     PHQ over the last two weeks 3/21/2018   Little interest or pleasure in doing things Several days   Feeling down, depressed or hopeless Several days   Total Score PHQ 2 2   Trouble falling or staying asleep, or sleeping too much -   Feeling tired or having little energy -   Poor appetite or overeating -   Feeling bad about yourself - or that you are a failure or have let yourself or your family down -   Trouble concentrating on things such as school, work, reading or watching TV -   Moving or speaking so slowly that other people could have noticed; or the opposite being so fidgety that others notice -   Thoughts of being better off dead, or hurting yourself in some way -   How difficult have these problems made it for you to do your work, take care of your home and get along with others -     Fall Risk Assessment:     Fall Risk Assessment, last 12 mths 3/21/2018   Able to walk? Yes   Fall in past 12 months? Yes   Fall with injury? No   Number of falls in past 12 months 1   Fall Risk Score 1     Abuse Screening:     Abuse Screening Questionnaire 3/21/2018   Do you ever feel afraid of your partner?  N   Are you in a relationship with someone who physically or mentally threatens you? N   Is it safe for you to go home? Y     Coordination of Care Questionaire:   1. Have you been to the ER, urgent care clinic since your last visit? Hospitalized since your last visit? NO    2. Have you seen or consulted any other health care providers outside of the [address] [place] [address] since your last visit? Include any pap smears or colon screening. NO    Advanced Directive:   1. Do you have an Advanced Directive? NO    2. Would you like information on Advanced Directives? NO    Pneumo 23 Immunization/s administered 5/11/2018 by Tyree Levi LPN with guardian's consent. Patient tolerated procedure well. No reactions noted.

## 2018-05-11 NOTE — PATIENT INSTRUCTIONS
To Do: · Stop by the lab on the way out for bloodwork      Notes from your doctor:  · Ask Dr. Angelica Rubi if you can get your bloodwork done with me if it is \"not super special tests\".   If so, just drop off the lab slip to me, and we will call you once I have the orders in the system (it takes me a bit to get them into our computer system)

## 2018-05-12 LAB
25(OH)D3+25(OH)D2 SERPL-MCNC: 49.5 NG/ML (ref 30–100)
ALBUMIN SERPL-MCNC: 4.3 G/DL (ref 3.6–4.8)
ALBUMIN/GLOB SERPL: 1.5 {RATIO} (ref 1.2–2.2)
ALP SERPL-CCNC: 89 IU/L (ref 39–117)
ALT SERPL-CCNC: 19 IU/L (ref 0–44)
AST SERPL-CCNC: 15 IU/L (ref 0–40)
BILIRUB SERPL-MCNC: 0.4 MG/DL (ref 0–1.2)
BUN SERPL-MCNC: 8 MG/DL (ref 8–27)
BUN/CREAT SERPL: 10 (ref 10–24)
CALCIUM SERPL-MCNC: 9.4 MG/DL (ref 8.6–10.2)
CHLORIDE SERPL-SCNC: 96 MMOL/L (ref 96–106)
CO2 SERPL-SCNC: 25 MMOL/L (ref 18–29)
CREAT SERPL-MCNC: 0.8 MG/DL (ref 0.76–1.27)
EST. AVERAGE GLUCOSE BLD GHB EST-MCNC: 137 MG/DL
GFR SERPLBLD CREATININE-BSD FMLA CKD-EPI: 108 ML/MIN/1.73
GFR SERPLBLD CREATININE-BSD FMLA CKD-EPI: 94 ML/MIN/1.73
GLOBULIN SER CALC-MCNC: 2.9 G/DL (ref 1.5–4.5)
GLUCOSE SERPL-MCNC: 98 MG/DL (ref 65–99)
HBA1C MFR BLD: 6.4 % (ref 4.8–5.6)
POTASSIUM SERPL-SCNC: 3.9 MMOL/L (ref 3.5–5.2)
PROT SERPL-MCNC: 7.2 G/DL (ref 6–8.5)
SODIUM SERPL-SCNC: 139 MMOL/L (ref 134–144)

## 2018-05-16 NOTE — PROGRESS NOTES
Please call Denisse Forbes Sr. Labs look good, including VitD, and diabetes control. No changes to his medications. Sent copy of his results to Dr. Shavon Harris as well    Thanks.

## 2018-06-12 DIAGNOSIS — E11.9 DIABETES MELLITUS TYPE 2, NONINSULIN DEPENDENT (HCC): Chronic | ICD-10-CM

## 2018-06-12 RX ORDER — LANCETS
EACH MISCELLANEOUS
Qty: 200 EACH | Refills: 3 | Status: SHIPPED | OUTPATIENT
Start: 2018-06-12 | End: 2019-03-06 | Stop reason: SDUPTHER

## 2018-06-28 ENCOUNTER — OFFICE VISIT (OUTPATIENT)
Dept: FAMILY MEDICINE CLINIC | Age: 63
End: 2018-06-28

## 2018-06-28 VITALS
HEIGHT: 69 IN | HEART RATE: 99 BPM | BODY MASS INDEX: 27.13 KG/M2 | TEMPERATURE: 97.2 F | OXYGEN SATURATION: 100 % | RESPIRATION RATE: 19 BRPM | SYSTOLIC BLOOD PRESSURE: 103 MMHG | WEIGHT: 183.2 LBS | DIASTOLIC BLOOD PRESSURE: 81 MMHG

## 2018-06-28 DIAGNOSIS — K40.90 UNILATERAL INGUINAL HERNIA WITHOUT OBSTRUCTION OR GANGRENE, RECURRENCE NOT SPECIFIED: Primary | ICD-10-CM

## 2018-06-28 NOTE — PATIENT INSTRUCTIONS
Hernia: Care Instructions  Your Care Instructions    A hernia develops when tissue bulges through a weak spot in the wall of your belly. The groin area and the navel are common areas for a hernia. A hernia can also develop near the area of a surgery you had before. Pressure from lifting, straining, or coughing can tear the weak area, causing the hernia to bulge and be painful. If you cannot push a hernia back into place, the tissue may become trapped outside the belly wall. If the hernia gets twisted and loses its blood supply, it will swell and die. This is called a strangulated hernia. It usually causes a lot of pain. It needs treatment right away. Some hernias need to be repaired to prevent a strangulated hernia. If your hernia causes symptoms or is large, you may need surgery. Follow-up care is a key part of your treatment and safety. Be sure to make and go to all appointments, and call your doctor if you are having problems. It's also a good idea to know your test results and keep a list of the medicines you take. How can you care for yourself at home? · Take care when lifting heavy objects. · Stay at a healthy weight. · Do not smoke. Smoking can cause coughing, which can cause your hernia to bulge. If you need help quitting, talk to your doctor about stop-smoking programs and medicines. These can increase your chances of quitting for good. · Talk with your doctor before wearing a corset or truss for a hernia. These devices are not recommended for treating hernias and sometimes can do more harm than good. There may be certain situations when your doctor thinks a truss would work, but these are rare. When should you call for help? Call your doctor now or seek immediate medical care if:  ? · You have new or worse belly pain. ? · You are vomiting. ? · You cannot pass stools or gas. ? · You cannot push the hernia back into place with gentle pressure when you are lying down.    ? · The area over the hernia turns red or becomes tender. ? Watch closely for changes in your health, and be sure to contact your doctor if you have any problems. Where can you learn more? Go to http://nikita-hieu.info/. Enter C129 in the search box to learn more about \"Hernia: Care Instructions. \"  Current as of: May 12, 2017  Content Version: 11.4  © 2049-2345 [employer]. Care instructions adapted under license by [employer] (which disclaims liability or warranty for this information). If you have questions about a medical condition or this instruction, always ask your healthcare professional. [address] any warranty or liability for your use of this information.

## 2018-06-28 NOTE — PROGRESS NOTES
[facility]  Primary Care Office Visit - Problem-Oriented    : 1955   Yolis Billingsley is a 70 y.o. male presenting for  Chief Complaint   Patient presents with    Swelling     left groin       Assessment/Plan:     1. Unilateral inguinal hernia without obstruction or gangrene, recurrence not specified  Left sided. Not painful. Initial encounter. Yolis Billingsley is well versed in hernias as he has had R inguinal hernia repair many years ago. Given recommendations for activity modifications. Offered referral to general surgery, however Zo Rojas Sr. declined at this time. Given information re: self-care instructions, specifically if any symptoms of redness, swelling, significant pain at the site, to see emergent medical attention for possible incarceration of hernia. He voiced understanding. Orders & Diagnoses associated with This Encounter:         ICD-10-CM ICD-9-CM   1. Unilateral inguinal hernia without obstruction or gangrene, recurrence not specified K40.90 550.90         This document may have been created with the aid of dictation software. Text may contain errors, particularly phonetic errors. Reviewed management plan & instructions with patient, who voiced understanding. Juancho Lezama MD  Internal Medicine, Family Medicine & Sports Medicine  2018, 2:20 PM    Patient Instructions (provided in AVS):     Hernia: Care Instructions      History:   Yolis Billingsley is a 70 y.o. male presenting to address:  Chief Complaint   Patient presents with    Swelling     left groin       First noted 5 days ago, in the shower. No pain. Has decreased in size since first noticing. No redness or warmth. No inciting trauma. No changes in BM / bladder habits. No fevers or chills. No abdominal pain. Hx of right sided hernia.      Past Medical History:   Diagnosis Date    Diabetes ([address]     H. pylori infection     s/p prevpack  H/O esophagogastroduodenoscopy 3/1/2013    irregular z-line, small 1cm submucosal nodule    Hypercholesterolemia     Hypertension     Rheumatoid arthritis(714.0)      Past Surgical History:   Procedure Laterality Date    ENDOSCOPY, COLON, DIAGNOSTIC      HX HERNIA REPAIR      HX WISDOM TEETH EXTRACTION        reports that he has quit smoking. He has never used smokeless tobacco. He reports that he does not drink alcohol or use illicit drugs. Social History     Social History Narrative     History   Smoking Status    Former Smoker   Smokeless Tobacco    Never Used     Family History   Problem Relation Age of Onset    Heart Disease Mother     Heart Disease Father    Rolando Alverto Arthritis-rheumatoid Sister     Hypertension Sister     Heart Disease Sister     Heart Disease Brother      Allergies   Allergen Reactions    Statins-Hmg-Coa Reductase Inhibitors Myalgia       Problem List:      Patient Active Problem List    Diagnosis    Essential hypertension     - meds: losartan/HCTZ 50/12.5        Diabetes mellitus type 2, noninsulin dependent ([facility]     - meds: metformin 1000mg BID    - A1c 7.5 (Nov 2015)  - A1c 6.5 (4/14/2015)    - foot exam: 7/8/2014    Intolerant of statins.           Rheumatoid arthritis ([facility]     -1/16/2017 [Dr. Briana Schroeder: seropositive long-standing disease on arava monotherapy, reporting RAPID3 16.3; sicca syndrome; r/o Sjogrens, labs, possibly US to eval disease activity    -4/26/2016: [Rheum] refilled leflunomide 20 mg daily, noted that he would be switching to a rheumatologist closer to home  -2/17/2016: continue on leflunomide, plaquenil, labs: cbc, ESR, CMP, CRP, B hand xrays;  follow up 2 mo    Previously followed by Dr. Maribel Greco (rheum)    -- 9/29/2014 [rheum]: cont humira, plaquenil & tramadol; stop prednisone; flu shot given  -- 7/29/2014 [rheum]: cont humira & plaquenil; reduce prednisone to 2.5mg daily  -- 4/29/2014 [rheum]: combo of RA & DJD; continue humira, plaquenil & prednisone 5mg daily; added tramadol qhs prn; avoid NSAIDs; recs: check neck US to rule out lymph node swelling (R side, above clavicle)  -- Mar 2013: RF = 588      Hyperlipidemia with target LDL less than 100     - statin intolerant    -- tot 258 (H), HDL 45, , LDLc 186 (H) (1/10/2015); 10yr ASCVD risk 22.5%  -- tot 146, , HDL 51, LDLc 72 (7/8/2014)  -- Sept 2013: tot 139, LDL 76, HDL 42,       Chronic gastritis    Esophagitis     Chronic esophgitis by [facility]x with irregular z-line (Mar 2013[facility]  Type 2 diabetes mellitus with diabetic neuropathy ([facility]    DDD (degenerative disc disease), lumbosacral     L-spine XR (Dec 2016): Mild L4-S1 degenerative endplate reactive changes and disc space height loss. Moderate bilateral facet arthropathy L4-S1. Bilateral oblique views demonstrate moderate right and mild left L5-S1 foraminal stenoses.  Statin intolerance    Advance care planning    Right thyroid nodule     Followed by Dr. Jill Hamman (endo).     -- 6/10/2014 [endo]: FNA Bx negative for malignancy      Vitamin D deficiency     - VitD 34.4 (7/8/2014)    *5/7/2014: VitD 14.6 (L)  - start 50k 2x/wk      Elevated TSH    Decreased libido     -- 3/4/2014: slightly low free testosterone, normal PSA, but slightly elevated TSH      TERRY (obstructive sleep apnea)     Followed by Dr. Junior Gissel [facility])  -- 12/17/2013: overnight sleep study pending        Myalgia     *10/9/2014: improved off of januvia  *5/7/2014: sx unchanged off crestor    -- 3/4/2014: continue to hold crestor; advised massage & stretching  --  (2/21/2014); holding crestor until next appt  -- aldolase wnl (2/21/2014)      Routine health maintenance     -- c-scope: 4/6/2012 by Delmi Ramos ([address]), hyperplastic polyps; next due in 10 yrs (Apr 2022)      Neural foraminal stenosis of cervical spine     C-spine MRI (Apr 2013):  - focal R paracentral disc protrusion @ C3-4 with mild cord impingement  - mild to mod foraminal stenoses, primarily @ C3-4 and C4-5 on right  - no C1-2 rheumatoid instability      Hemoglobin C trait (HCC)     Seen on Hgb electrophoresis on 5/29/2013. Medications:     Current Outpatient Prescriptions   Medication Sig    loratadine 10 mg cap Take  by mouth.  glucose blood VI test strips (ACCU-CHEK SMARTVIEW TEST STRIP) strip CHECK BLOOD SUGAR TWICE DAILY.  Lancets (ACCU-CHEK SOFTCLIX LANCETS) misc Test Blood sugar twice daily    gabapentin (NEURONTIN) 100 mg capsule Take 2 Caps by mouth nightly.  losartan-hydroCHLOROthiazide (HYZAAR) 50-12.5 mg per tablet Take 1 Tab by mouth daily.  metFORMIN (GLUCOPHAGE) 1,000 mg tablet TAKE 1 TABLET TWICE DAILY WITH MEALS    ergocalciferol (ERGOCALCIFEROL) 50,000 unit capsule Take 1 Cap by mouth every seven (7) days.  Blood-Glucose Meter (ACCU-CHEK IDANIA) misc 1 Each by Does Not Apply route daily.  fluticasone (FLONASE) 50 mcg/actuation nasal spray 2 Sprays by Both Nostrils route daily.  traMADol (ULTRAM) 50 mg tablet TAKE 1 TABLET EVERY 8 HOURS AS NEEDED FOR PAIN  ( MAXIMUM DAILY AMOUNT:  150MG  )    leflunomide (ARAVA) 20 mg tablet Take 1 Tab by mouth daily.  ACCU-CHEK SMARTVIEW CONTRL SOL soln USE AS DIRECTED    acetaminophen (TYLENOL) 500 mg tablet 500 mg as needed.  red yeast rice extract 600 mg cap Take 1,800 mg by mouth daily.  aspirin 81 mg tablet Take 1 Tab by mouth daily. No current facility-administered medications for this visit. Review of Systems:     Review of Systems   Constitutional: Negative for chills, fever, malaise/fatigue and weight loss. Gastrointestinal: Negative for abdominal pain, blood in stool, constipation, diarrhea, heartburn, melena, nausea and vomiting. Genitourinary: Negative for dysuria, flank pain, frequency, hematuria and urgency.        Physical Assessment:   VS:    Vitals:    06/28/18 1412   BP: 103/81   Pulse: 99 Resp: 19   Temp: 97.2 °F (36.2 °C)   TempSrc: Oral   SpO2: 100%   Weight: 183 lb 3.2 oz (83.1 kg)   Height: 5' 9\" (1.753 m)   PainSc:   5   PainLoc: Back       Physical Exam   Constitutional: He is oriented to person, place, and time. He appears well-developed and well-nourished. No distress. HENT:   Head: Normocephalic and atraumatic. Right Ear: External ear normal.   Left Ear: External ear normal.   Mouth/Throat: Oropharynx is clear and moist.   Eyes: EOM are normal. Pupils are equal, round, and reactive to light. Neck: Normal range of motion. Neck supple. Cardiovascular: Normal rate, regular rhythm and normal heart sounds. No murmur heard. Pulmonary/Chest: Effort normal and breath sounds normal. No respiratory distress. He has no wheezes. Genitourinary:         Musculoskeletal: Normal range of motion. Left hip: He exhibits normal range of motion, normal strength and no tenderness. Neurological: He is alert and oriented to person, place, and time. No cranial nerve deficit. Skin: Skin is warm and dry. He is not diaphoretic. Psychiatric: He has a normal mood and affect. His behavior is normal. Judgment and thought content normal.   Nursing note reviewed.

## 2018-06-28 NOTE — PROGRESS NOTES
Jamel Blanton. is a 70 y.o. male (: 1955) presenting to address:    Chief Complaint   Patient presents with    Swelling     left groin       Vitals:    18 1412   BP: 103/81   Pulse: 99   Resp: 19   Temp: 97.2 °F (36.2 °C)   TempSrc: Oral   SpO2: 100%   Weight: 183 lb 3.2 oz (83.1 kg)   Height: 5' 9\" (1.753 m)   PainSc:   5   PainLoc: Back       Hearing/Vision:   No exam data present    Learning Assessment:     Learning Assessment 2014   PRIMARY LEARNER Patient   HIGHEST LEVEL OF EDUCATION - PRIMARY LEARNER  SOME COLLEGE   BARRIERS PRIMARY LEARNER NONE   CO-LEARNER CAREGIVER No   PRIMARY LANGUAGE ENGLISH   LEARNER PREFERENCE PRIMARY OTHER (COMMENT)   ANSWERED BY Patient   RELATIONSHIP SELF     Depression Screening:     PHQ over the last two weeks 2018   Little interest or pleasure in doing things Not at all   Feeling down, depressed or hopeless Not at all   Total Score PHQ 2 0   Trouble falling or staying asleep, or sleeping too much -   Feeling tired or having little energy -   Poor appetite or overeating -   Feeling bad about yourself - or that you are a failure or have let yourself or your family down -   Trouble concentrating on things such as school, work, reading or watching TV -   Moving or speaking so slowly that other people could have noticed; or the opposite being so fidgety that others notice -   Thoughts of being better off dead, or hurting yourself in some way -   How difficult have these problems made it for you to do your work, take care of your home and get along with others -     Fall Risk Assessment:     Fall Risk Assessment, last 12 mths 2018   Able to walk? Yes   Fall in past 12 months? No   Fall with injury? -   Number of falls in past 12 months -   Fall Risk Score -     Abuse Screening:     Abuse Screening Questionnaire 3/21/2018   Do you ever feel afraid of your partner? N   Are you in a relationship with someone who physically or mentally threatens you?  Jess Hamilton Is it safe for you to go home? Y     Coordination of Care Questionaire:   1. Have you been to the ER, urgent care clinic since your last visit? Hospitalized since your last visit? NO    2. Have you seen or consulted any other health care providers outside of the [facility] since your last visit? Include any pap smears or colon screening. NO    Advanced Directive:   1. Do you have an Advanced Directive? NO    2. Would you like information on Advanced Directives?  NO

## 2018-09-10 ENCOUNTER — PATIENT OUTREACH (OUTPATIENT)
Dept: FAMILY MEDICINE CLINIC | Age: 63
End: 2018-09-10

## 2018-09-10 NOTE — PROGRESS NOTES
[facility] Discharge Follow-Up Date/Time:  9/10/2018 1:14 PM 
 
Patient was admitted to [facility] on 9/5/18 and discharged on 9/8/18 for Chest Pains. The physician discharge summary was available at the time of outreach. NN attempted to contact patient 9/10/2018 had to leave a call back contact information for return call. Patient did call office for an hospital follow up appointment ( 9/21/2018 ) Inpatient RRAT score:. Medium Risk   
      
 17 Total Score 17 Charlson Comorbidity Score (Age + Comorbid Conditions) Criteria that do not apply:  
 Has Seen PCP in Last 6 Months (Yes=3, No=0) . Living with Significant Other. Assisted Living. LTAC. SNF. or  
Rehab Patient Length of Stay (>5 days = 3) IP Visits Last 12 Months (1-3=4, 4=9, >4=11) Pt. Coverage (Medicare=5 , Medicaid, or Self-Pay=4) Was this a readmission?  no  
Patient stated reason for the readmission: N/A

## 2018-09-11 ENCOUNTER — PATIENT OUTREACH (OUTPATIENT)
Dept: FAMILY MEDICINE CLINIC | Age: 63
End: 2018-09-11

## 2018-09-11 RX ORDER — ATORVASTATIN CALCIUM 40 MG/1
40 TABLET, FILM COATED ORAL
COMMUNITY
Start: 2018-09-08 | End: 2018-09-25

## 2018-09-11 RX ORDER — KETOCONAZOLE 20 MG/G
1 CREAM TOPICAL
COMMUNITY
End: 2018-11-29

## 2018-09-11 RX ORDER — FAMOTIDINE 20 MG/1
20 TABLET, FILM COATED ORAL 2 TIMES DAILY
COMMUNITY
Start: 2018-09-08 | End: 2018-11-29

## 2018-09-11 RX ORDER — IBUPROFEN 600 MG/1
600 TABLET ORAL
COMMUNITY
Start: 2018-09-08 | End: 2018-10-08

## 2018-09-11 RX ORDER — COLCHICINE 0.6 MG/1
0.6 TABLET ORAL 2 TIMES DAILY
COMMUNITY
Start: 2018-09-08 | End: 2018-09-11

## 2018-09-11 NOTE — Clinical Note
Hi Doc, can you give me advice on what to tell this patient concerning these medications please. 1. Patient can not afford Colchicine ( $291.00 Co-pay) it is a Tier 2 drug.( Pericarditis) Is there any other medication? ?  If you can review the Echo and CXR   2.  Patient has concern about Lipitor 40 mg at BT Statin) use??. 3. Started on Pepcid 20 mg 2 times daily  4.  600 mg Motrin 3 times a day for Pericarditis

## 2018-09-11 NOTE — PROGRESS NOTES
[facility] Discharge Follow-Up Date/Time:  2018 12:45 PM 
 
Patient was admitted to [facility] on 18 and discharged on 18 for chest pains> Pericarditis. The physician discharge summary was available at the time of outreach. Patient was contacted within 2 business days of discharge. Top Challenges reviewed with the provider 1. Patient can not afford Colchicine ( $291.00 Co-pay) it is a Tier 2 drug.( Pericarditis) 2. Patient has concern about Lipitor ( Statin) use. 3. Started on Pepcid 20 mg 2 times daily with 600 mg Motrin 3 times a day for Pericarditis Method of communication with provider :chart routing Inpatient RRAT score: . Medium Risk   
      
 17 Total Score 17 Charlson Comorbidity Score (Age + Comorbid Conditions) Criteria that do not apply:  
 Has Seen PCP in Last 6 Months (Yes=3, No=0) . Living with Significant Other. Assisted Living. LTAC. SNF. or  
Rehab Patient Length of Stay (>5 days = 3) IP Visits Last 12 Months (1-3=4, 4=9, >4=11) Pt. Coverage (Medicare=5 , Medicaid, or Self-Pay=4) Was this a readmission? no  
Patient stated reason for the readmission: n/a Nurse Navigator (NN) contacted the patient by telephone to perform post hospital discharge assessment. Verified name and  with patient as identifiers. Provided introduction to self, and explanation of the Nurse Navigator role. Reviewed discharge instructions and red flags with patient who verbalized understanding. Patient given an opportunity to ask questions and does not have any further questions or concerns at this time. The patient agrees to contact the PCP office for questions related to their healthcare. NN provided contact information for future reference. Disease Specific:   N/A Summary of patient's top problems: 1. Medication assistance or alternitive medication 2. Continue Yeast Red Rice for cholesterol or statins? Home Health orders at discharge: No needs Home Health company: N/A Date of initial visit: N/A Durable Medical Equipment ordered/company: N/A Durable Medical Equipment received: N/A Barriers to care? Cost of medication ( Colchicine ) Advance Care Planning:  
Does patient have an Advance Directive:  not on file Medication(s):  
New Medications at Discharge: START taking these medications Details  
atorvastatin (LIPITOR) 40 mg PO TABS Take 1 Tab by Mouth Every Night at Bedtime. Qty: 30 Tab, Refills: 0  
 
colchicine 0.6 mg PO TABS Take 1 Tab by Mouth Twice Daily. Indications: pericarditis Qty: 30 Tab, Refills: 0  
 
famotidine (PEPCID AC) 20 mg PO TABS Take 1 Tab by Mouth Twice Daily. Qty: 60 Tab, Refills: 0  
 
ibuprofen (MOTRIN) 600 mg PO TABS Take 1 Tab by Mouth Three Times Daily with Meals for 30 days. Indications: pericarditis Qty: 90 Tab, Refills: 0 Changed Medications at Discharge: N/A Discontinued Medications at Discharge: N/A Medication reconciliation was performed with patient, who verbalizes understanding of administration of home medications. There were no barriers to obtaining medications identified at this time. Referral to Pharm D needed: no  
 
Current Outpatient Prescriptions Medication Sig  ibuprofen (MOTRIN) 600 mg tablet Take 600 mg by mouth three (3) times daily (with meals). Pericarditis  ketoconazole (NIZORAL) 2 % topical cream Apply 1 Applicator to affected area daily as needed.  loratadine 10 mg cap Take  by mouth.  Lancets (ACCU-CHEK SOFTCLIX LANCETS) misc Test Blood sugar twice daily  gabapentin (NEURONTIN) 100 mg capsule Take 2 Caps by mouth nightly.  losartan-hydroCHLOROthiazide (HYZAAR) 50-12.5 mg per tablet Take 1 Tab by mouth daily.  metFORMIN (GLUCOPHAGE) 1,000 mg tablet TAKE 1 TABLET TWICE DAILY WITH MEALS  ergocalciferol (ERGOCALCIFEROL) 50,000 unit capsule Take 1 Cap by mouth every seven (7) days.  Blood-Glucose Meter (ACCU-CHEK IDANIA) misc 1 Each by Does Not Apply route daily.  fluticasone (FLONASE) 50 mcg/actuation nasal spray 2 Sprays by Both Nostrils route daily.  traMADol (ULTRAM) 50 mg tablet TAKE 1 TABLET EVERY 8 HOURS AS NEEDED FOR PAIN  ( MAXIMUM DAILY AMOUNT:  150MG  )  leflunomide (ARAVA) 20 mg tablet Take 1 Tab by mouth daily.  ACCU-CHEK SMARTVIEW CONTRL SOL soln USE AS DIRECTED  acetaminophen (TYLENOL) 500 mg tablet 500 mg as needed.  red yeast rice extract 600 mg cap Take 1,800 mg by mouth daily.  aspirin 81 mg tablet Take 1 Tab by mouth daily.  atorvastatin (LIPITOR) 40 mg tablet Take 40 mg by mouth nightly.  colchicine 0.6 mg tablet Take 0.6 mg by mouth two (2) times a day. Pericarditis  famotidine (PEPCID) 20 mg tablet Take 20 mg by mouth two (2) times a day.  glucose blood VI test strips (ACCU-CHEK SMARTVIEW TEST STRIP) strip CHECK BLOOD SUGAR TWICE DAILY. No current facility-administered medications for this visit. There are no discontinued medications. [facility] follow up appointment(s): Future Appointments Date Time Provider Gela Mcdonald 9/21/2018 10:00 AM Valdez López MD [facility] Non-[facility] follow up appointment(s): N/A [facility]:  n/a  
 
 
Goals Post Hospitalization  Attends follow-up appointments as directed. Patient will keep f/u [facility] as scheduled  Knowledge and adherence of prescribed medication NN reviewed  action, side effects, missed dose, for: 
Colchicine 0.6 mg  
Motrin 600 mg Lipitor 40 mg Patient verbalizing understanding ,but not able to afford  Colchicine $291.00 x 30 day supply. NN looking in to assistance. Patient verbalizing that Dr Amy Restrepo did not want him taking Statins/ cholesterol medications for his pains in joints/muscles, NN will speak with Dr Amy Restrepo  Prevent complications post hospitalization.

## 2018-09-11 NOTE — TELEPHONE ENCOUNTER
Received msg from  [employer] that \"Patient can not afford Colchicine ( $291.00 Co-pay) it is a Tier 2 drug.( Pericarditis) Is there any other medication? \"    Reviewed with Manuel Rosario, clinical pharmacist.    Please ask what local pharmacy he wants us to try to use, while we try to get prior authorization ASAP. Also please advise him to NOT take the lipitor for now. Thanks.

## 2018-09-12 ENCOUNTER — PATIENT OUTREACH (OUTPATIENT)
Dept: FAMILY MEDICINE CLINIC | Age: 63
End: 2018-09-12

## 2018-09-12 RX ORDER — COLCHICINE 0.6 MG/1
0.6 TABLET ORAL 2 TIMES DAILY
Qty: 30 TAB | Refills: 0 | Status: SHIPPED | OUTPATIENT
Start: 2018-09-12 | End: 2018-09-25

## 2018-09-12 NOTE — TELEPHONE ENCOUNTER
Spoke to patient and informed as well as updated with local pharmacy.  Patient aware to not take lipitor for now

## 2018-09-12 NOTE — PROGRESS NOTES
Patient left message for NN stating that he got a call from the office and thought it was from me. NN returned called as Dr José Manuel Mahajan nurse had called him and left message for return call and she sees that the nurse has spoken to him already this morning as follow up. Patient said yes. NN reinforce to NOT take Lipitor. Patient said yes, I  Did not get it filled anyway. Did go over [facility] appointment date and time as well. No complaints voiced.

## 2018-09-25 ENCOUNTER — OFFICE VISIT (OUTPATIENT)
Dept: FAMILY MEDICINE CLINIC | Age: 63
End: 2018-09-25

## 2018-09-25 ENCOUNTER — HOSPITAL ENCOUNTER (OUTPATIENT)
Dept: LAB | Age: 63
Discharge: HOME | End: 2018-09-25

## 2018-09-25 VITALS
RESPIRATION RATE: 14 BRPM | TEMPERATURE: 95.2 F | OXYGEN SATURATION: 98 % | SYSTOLIC BLOOD PRESSURE: 138 MMHG | DIASTOLIC BLOOD PRESSURE: 80 MMHG | WEIGHT: 185 LBS | HEART RATE: 69 BPM | BODY MASS INDEX: 27.4 KG/M2 | HEIGHT: 69 IN

## 2018-09-25 DIAGNOSIS — K29.50 CHRONIC GASTRITIS WITHOUT BLEEDING, UNSPECIFIED GASTRITIS TYPE: Chronic | ICD-10-CM

## 2018-09-25 DIAGNOSIS — E55.9 VITAMIN D DEFICIENCY: Chronic | ICD-10-CM

## 2018-09-25 DIAGNOSIS — E11.9 DIABETES MELLITUS TYPE 2, NONINSULIN DEPENDENT (HCC): Chronic | ICD-10-CM

## 2018-09-25 DIAGNOSIS — E78.5 HYPERLIPIDEMIA WITH TARGET LDL LESS THAN 100: Chronic | ICD-10-CM

## 2018-09-25 DIAGNOSIS — I31.9 PERICARDITIS ASSOCIATED WITH RHEUMATOID ARTHRITIS, UNSPECIFIED CHRONICITY (HCC): Primary | ICD-10-CM

## 2018-09-25 DIAGNOSIS — M79.10 MYALGIA: Chronic | ICD-10-CM

## 2018-09-25 DIAGNOSIS — Z23 ENCOUNTER FOR IMMUNIZATION: ICD-10-CM

## 2018-09-25 DIAGNOSIS — Z78.9 STATIN INTOLERANCE: ICD-10-CM

## 2018-09-25 DIAGNOSIS — I10 ESSENTIAL HYPERTENSION: Chronic | ICD-10-CM

## 2018-09-25 DIAGNOSIS — M06.9 RHEUMATOID ARTHRITIS INVOLVING MULTIPLE SITES, UNSPECIFIED RHEUMATOID FACTOR PRESENCE: Chronic | ICD-10-CM

## 2018-09-25 DIAGNOSIS — M06.9 PERICARDITIS ASSOCIATED WITH RHEUMATOID ARTHRITIS, UNSPECIFIED CHRONICITY (HCC): Primary | ICD-10-CM

## 2018-09-25 PROCEDURE — 99001 SPECIMEN HANDLING PT-LAB: CPT | Performed by: FAMILY MEDICINE

## 2018-09-25 NOTE — PROGRESS NOTES
[address] [facility] Primary Care Office Visit - Problem-Oriented : 1955 Shekhar Lorenzana is a 70 y.o. male presenting for Chief Complaint Patient presents with  
[facility] Follow Up [place] [address] 18 Assessment/Plan: 1. Pericarditis associated with rheumatoid arthritis, unspecified chronicity ([address] Still on ibuprofen 600mg TID + famotidine. Checking renal function. 
- REFERRAL TO CARDIOLOGY 
- METABOLIC PANEL, BASIC; Future 2. Encounter for immunization - Influenza Vaccine Inactivated (IIV)(FLUAD), Subunit, Adjuvanted, IM, (53198) - Administration fee () for Medicare insured patients 3. Rheumatoid arthritis involving multiple sites, unspecified rheumatoid factor presence ([facility] Followed by rheumatology (Dr. Ham Ragland). Labs ordered for early Nov, will route results to Dr. Ham Ragland accordingly. 
- CBC WITH AUTOMATED DIFF; Future - METABOLIC PANEL, COMPREHENSIVE; Future 4. Essential hypertension Well controlled, no change to current regimen. Key CAD CHF Meds   
    
  
 losartan-hydroCHLOROthiazide (HYZAAR) 50-12.5 mg per tablet  (Taking) Take 1 Tab by mouth daily. aspirin 81 mg tablet  (Taking) Take 1 Tab by mouth daily. 5. Diabetes mellitus type 2, noninsulin dependent ([address] Unclear control, but likely stable. Will recheck A1c. No change to current regimen. 
- HEMOGLOBIN A1C WITH EAG; Future Key Antihyperglycemic Medications   
    
  
 metFORMIN (GLUCOPHAGE) 1,000 mg tablet  (Taking) TAKE 1 TABLET TWICE DAILY WITH MEALS 6. Hyperlipidemia with target LDL less than 100 Ongoing, uncontrolled, with significant history of  
8. Statin intolerance And 10. Myalgia Is NOT on lipitor. Discussed briefly with Shekhar Lorenzana and his wife the potential of trial of low dose rosuvastatin or atorvastatin every OTHER day to see if he could tolerate that. Will defer this trial until next visit. - CK; Future - LIPID PANEL; Future 7. Chronic gastritis without bleeding, unspecified gastritis type Stable. On famotidine as he is on high dose NSAID daily. 9. Vitamin D deficiency Unclear control 
- VITAMIN D, 25 HYDROXY; Future Orders & Diagnoses associated with This Encounter: ICD-10-CM ICD-9-CM 1. Pericarditis associated with rheumatoid arthritis, unspecified chronicity (HCC) I31.9 423.9 M06.9 714.0 2. Encounter for immunization Z23 V03.89  
3. Rheumatoid arthritis involving multiple sites, unspecified rheumatoid factor presence (HCC) M06.9 714.0 4. Essential hypertension I10 401.9 5. Diabetes mellitus type 2, noninsulin dependent (HCC) E11.9 250.00  
6. Hyperlipidemia with target LDL less than 100 E78.5 272.4 7. Chronic gastritis without bleeding, unspecified gastritis type K29.50 535.10  
8. Statin intolerance Z78.9 995.27  
9. Vitamin D deficiency E55.9 268.9 10. Myalgia M79.1 729.1 Orders Placed This Encounter  Influenza Vaccine Inactivated (IIV)(FLUAD), Subunit, Adjuvanted, IM, (49107)  METABOLIC PANEL, BASIC Standing Status:   Future Number of Occurrences:   1 Standing Expiration Date:   9/26/2019  CBC WITH AUTOMATED DIFF Standing Status:   Future Standing Expiration Date:   8/30/2019  METABOLIC PANEL, COMPREHENSIVE Standing Status:   Future Standing Expiration Date:   8/30/2019  
 HEMOGLOBIN A1C WITH EAG Standing Status:   Future Standing Expiration Date:   8/30/2019  LIPID PANEL Standing Status:   Future Standing Expiration Date:   8/30/2019  CK Standing Status:   Future Standing Expiration Date:   8/30/2019  VITAMIN D, 25 HYDROXY Standing Status:   Future Standing Expiration Date:   8/30/2019  REFERRAL TO CARDIOLOGY Referral Priority:   Routine Referral Type:   Consultation Referral Reason:   Specialty Services Required Referral Location:   [facility] Referred to Provider:   Elyse Joy MD  
 Administration fee () for Medicare insured patients This document may have been created with the aid of dictation software. Text may contain errors, particularly phonetic errors. Reviewed management plan & instructions with patient, who voiced understanding. Khalida Reynaga MD 
Internal Medicine, Family Medicine & Sports Medicine 9/25/2018, 2:40 PM 
 
Patient Instructions (provided in AVS): To Do: 
· bloodwork on your way out to check on your kidneys · Keep going with your ibuprofen and your famotidine. .. Until the cardiologist weighs in on how long you need to be on that treatment · Cardiology should be calling you by the end of the week to schedule your folllow-up appointment with them · Plan on coming back for FASTING bloodwork in early November (this will include the labwork for Dr. Eros Ortega) Notes from your doctor: · One of the things we should consider in the future is to maybe take very low dose cholesterol medicine every other day (trying to get you the benefits, without making you feel bad) History:  
Jaya Porter Sr. is a 70 y.o. male presenting to address: Chief Complaint Patient presents with  
[facility] Follow Up [facility] 9/5/18 Admit Date: 9/5/2018 D/C Date: 9/8/2018 Problems managed during hospitalization: 
- acute pericarditis 
- HTN 
- DM2 
- RA 
- TERRY - HLD Since hospitalization, has seen Dr. Eros Ortega (rheum). Is still taking 600mg ibuprofen TID along with famotidine 20mg BID. Was unable to afford the colchicine, and is not taking the lipitor 2/2 known significant AE in the past. 
 
Overall is doing well. Notes that he has always had a \"bit of a chest discomfort\" in the epigastric substernal area for years, however what stemmed the ED visit that day was that it was \"so much worse than usual, way worse. \" Is wondering how long he should be on the ibuprofen, and if it is okay on his kidneys. Is still quite hesitant to take statins, but is willing to consider low-dose every other day in the future, possibly. Tried to get an appt with cardiology as instructed, but was told they were out of network. Past Medical History:  
Diagnosis Date  Diabetes ([facility]  H. pylori infection s/p prevpack  H/O esophagogastroduodenoscopy 3/1/2013  
 irregular z-line, small 1cm submucosal nodule  Hypercholesterolemia  Hypertension  Rheumatoid arthritis(714.0) Past Surgical History:  
Procedure Laterality Date  ENDOSCOPY, COLON, DIAGNOSTIC    
 HX HERNIA REPAIR Right 1998  HX WISDOM TEETH EXTRACTION    
 
 reports that he has quit smoking. He has never used smokeless tobacco. He reports that he does not drink alcohol or use illicit drugs. Social History Social History Narrative History Smoking Status  Former Smoker Smokeless Tobacco  
 Never Used Family History Problem Relation Age of Onset  Heart Disease Mother  Heart Disease Father Jodie Bradshaw Arthritis-rheumatoid Sister  Hypertension Sister  Heart Disease Sister  Heart Disease Brother Allergies Allergen Reactions  Statins-Hmg-Coa Reductase Inhibitors Myalgia Problem List:  
  
Patient Active Problem List  
 Diagnosis  Essential hypertension  
  - meds: losartan/HCTZ 50/12.5  Diabetes mellitus type 2, noninsulin dependent ([facility]  
  - meds: metformin 1000mg BID 
 
- A1c 7.5 (Nov 2015) - A1c 6.5 (4/14/2015) 
 
- foot exam: 7/8/2014 Intolerant of statins.  Rheumatoid arthritis ([facility]  
  -1/16/2017 [Dr. Donell Carver: seropositive long-standing disease on arava monotherapy, reporting RAPID3 16.3; sicca syndrome; r/o Sjogrens, labs, possibly US to eval disease activity 
 
-4/26/2016: [[facility]he[facility]] refilled leflunomide 20 mg daily, noted that he would be switching to a rheumatologist closer to home -2/17/2016: continue on leflunomide, plaquenil, labs: cbc, ESR, CMP, CRP, B hand xrays;  follow up 2 mo Previously followed by Dr. Alfred Lewis (rheum) -- 9/29/2014 [rheum]: cont humira, plaquenil & tramadol; stop prednisone; flu shot given 
-- 7/29/2014 [rheum]: cont humira & plaquenil; reduce prednisone to 2.5mg daily 
-- 4/29/2014 [rheum]: combo of RA & DJD; continue humira, plaquenil & prednisone 5mg daily; added tramadol qhs prn; avoid NSAIDs; recs: check neck US to rule out lymph node swelling (R side, above clavicle) -- Mar 2013: RF = 588  Hyperlipidemia with target LDL less than 100  
  - statin intolerant 
 
-- tot 258 (H), HDL 45, , LDLc 186 (H) (1/10/2015); 10yr ASCVD risk 22.5% 
-- tot 146, , HDL 51, LDLc 72 (7/8/2014) -- Sept 2013: tot 139, LDL 76, HDL 42,   Chronic gastritis  Esophagitis Chronic esophgitis by bx with irregular z-line (Mar 2013) by Dr. Leydi Washington ([facility]).  Type 2 diabetes mellitus with diabetic neuropathy (HCC)  DDD (degenerative disc disease), lumbosacral  
  L-spine XR (Dec 2016): Mild L4-S1 degenerative endplate reactive changes and disc space height loss. Moderate bilateral facet arthropathy L4-S1. Bilateral oblique views demonstrate moderate right and mild left L5-S1 foraminal stenoses.  Statin intolerance  Advance care planning  Right thyroid nodule Followed by Dr. Jorge Cruz (endo). -- 6/10/2014 [endo]: FNA Bx negative for malignancy  Vitamin D deficiency - VitD 34.4 (7/8/2014) *5/7/2014: VitD 14.6 (L) 
- start 50k 2x/wk  Elevated TSH  Decreased libido  
  -- 3/4/2014: slightly low free testosterone, normal PSA, but slightly elevated TSH  TERRY (obstructive sleep apnea) Followed by Dr. Samanta [facility]) 
-- 12/17/2013: overnight sleep study pending  Myalgia *10/9/2014: improved off of [address] *5/7/2014: sx unchanged off crestor -- 3/4/2014: continue to hold crestor; advised massage & stretching 
--  (2/21/2014); holding crestor until next appt 
-- aldolase wnl (2/21/2014)  Routine health maintenance  
  -- c-scope: 4/6/2012 by Tataina Galvez ([address]), hyperplastic polyps; next due in 10 yrs (Apr 2022)  Neural foraminal stenosis of cervical spine C-spine MRI (Apr 2013): 
- focal R paracentral disc protrusion @ C3-4 with mild cord impingement 
- mild to mod foraminal stenoses, primarily @ C3-4 and C4-5 on right 
- no C1-2 rheumatoid instability  Hemoglobin C trait ([address] Seen on Hgb electrophoresis on 5/29/2013. Medications:  
 
Current Outpatient Prescriptions Medication Sig  
 famotidine (PEPCID) 20 mg tablet Take 20 mg by mouth two (2) times a day.  ibuprofen (MOTRIN) 600 mg tablet Take 600 mg by mouth three (3) times daily (with meals). Pericarditis  ketoconazole (NIZORAL) 2 % topical cream Apply 1 Applicator to affected area daily as needed.  loratadine 10 mg cap Take  by mouth.  glucose blood VI test strips (ACCU-CHEK SMARTVIEW TEST STRIP) strip CHECK BLOOD SUGAR TWICE DAILY.  gabapentin (NEURONTIN) 100 mg capsule Take 2 Caps by mouth nightly.  losartan-hydroCHLOROthiazide (HYZAAR) 50-12.5 mg per tablet Take 1 Tab by mouth daily.  metFORMIN (GLUCOPHAGE) 1,000 mg tablet TAKE 1 TABLET TWICE DAILY WITH MEALS  ergocalciferol (ERGOCALCIFEROL) 50,000 unit capsule Take 1 Cap by mouth every seven (7) days.  Blood-Glucose Meter (ACCU-CHEK IDANIA) misc 1 Each by Does Not Apply route daily.  fluticasone (FLONASE) 50 mcg/actuation nasal spray 2 Sprays by Both Nostrils route daily.  traMADol (ULTRAM) 50 mg tablet TAKE 1 TABLET EVERY 8 HOURS AS NEEDED FOR PAIN  ( MAXIMUM DAILY AMOUNT:  150MG  )  leflunomide (ARAVA) 20 mg tablet Take 1 Tab by mouth daily.   
 ACCU-CHEK SMARTVIEW CONTRL SOL soln USE AS DIRECTED  
  acetaminophen (TYLENOL) 500 mg tablet 500 mg as needed.  red yeast rice extract 600 mg cap Take 1,800 mg by mouth daily.  aspirin 81 mg tablet Take 1 Tab by mouth daily.  colchicine (COLCRYS) 0.6 mg tablet Take 1 Tab by mouth two (2) times a day. Indications: Pericarditis  atorvastatin (LIPITOR) 40 mg tablet Take 40 mg by mouth nightly.  Lancets (ACCU-CHEK SOFTCLIX LANCETS) misc Test Blood sugar twice daily No current facility-administered medications for this visit. Review of Systems:  
 
Review of Systems Constitutional: Negative for chills and fever. HENT: Negative for ear pain and sore throat. Respiratory: Negative for cough and shortness of breath. Cardiovascular: Negative for chest pain and palpitations. Gastrointestinal: Negative for abdominal pain and heartburn. Genitourinary: Negative for dysuria. Musculoskeletal: Positive for joint pain (chronic, stable). Negative for myalgias. Skin: Negative for rash. Neurological: Negative for speech change, focal weakness and headaches. Endo/Heme/Allergies: Does not bruise/bleed easily. Psychiatric/Behavioral: Negative for depression. The patient is not nervous/anxious and does not have insomnia. Physical Assessment:  
VS:   
Vitals:  
 09/25/18 1358 BP: 149/90 Pulse: 69 Resp: 14 Temp: 95.2 °F (35.1 °C) SpO2: 98% Weight: 185 lb (83.9 kg) Height: 5' 9\" (1.753 m) PainSc:   3 PainLoc: Generalized Physical Exam  
Constitutional: He is oriented to person, place, and time. He appears well-developed and well-nourished. No distress. HENT:  
Head: Normocephalic and atraumatic. Right Ear: External ear normal.  
Left Ear: External ear normal.  
Mouth/Throat: Oropharynx is clear and moist.  
Eyes: EOM are normal. Pupils are equal, round, and reactive to light. Neck: Normal range of motion. Neck supple. Cardiovascular: Normal rate, regular rhythm and normal heart sounds. No murmur heard. Pulmonary/Chest: Effort normal and breath sounds normal. No respiratory distress. He has no wheezes. Musculoskeletal: Normal range of motion. Neurological: He is alert and oriented to person, place, and time. No cranial nerve deficit. Skin: Skin is warm and dry. He is not diaphoretic. Psychiatric: He has a normal mood and affect. His behavior is normal. Judgment and thought content normal.  
Nursing note reviewed. Records Review:  
 
Echo (9/6/2018): NO EVIDENCE OF PERICARDIAL EFFUSION. DECREASED LEFT VENTRICULAR CAVITY SIZE. MODERATE SEPTAL WITH MILD INFEROLATERAL WALL HYPERTROPHY. NORMAL GLOBAL LEFT VENTRICULAR SYSTOLIC FUNCTION WITH A CALCULATED EJECTION FRACTION OF 
 71%. NO WALL MOTION ABNORMALITIES. MILD DIASTOLIC DYSFUNCTION. NORMAL RIGHT VENTRICULAR SIZE AND  FUNCTION. MILD TRICUSPID REGURGITATION. ESTIMATED PULMONARY ARTERY PRESSURE OF 23 MMHG.

## 2018-09-25 NOTE — PROGRESS NOTES
Flu AD Immunization/s administered 9/25/2018 by Ketty Bhagat LPN with consent. Patient tolerated procedure well. No reactions noted.

## 2018-09-25 NOTE — PATIENT INSTRUCTIONS
To Do: 
· bloodwork on your way out to check on your kidneys · Keep going with your ibuprofen and your famotidine. .. Until the cardiologist weighs in on how long you need to be on that treatment · Cardiology should be calling you by the end of the week to schedule your folllow-up appointment with them · Plan on coming back for FASTING bloodwork in early November (this will include the labwork for Dr. Edith Cuellar) Notes from your doctor: · One of the things we should consider in the future is to maybe take very low dose cholesterol medicine every other day (trying to get you the benefits, without making you feel bad)

## 2018-09-25 NOTE — PROGRESS NOTES
ROOM #16 PT WANTS FLU SHOT Claudette Meza. is a 70 y.o. male (: 1955) presenting to address: Chief Complaint Patient presents with  
[facility] Follow Up Rockne Holiday 9/5/18 Vitals:  
 18 1358 BP: 149/90 Pulse: 69 Resp: 14 Temp: 95.2 °F (35.1 °C) SpO2: 98% Weight: 185 lb (83.9 kg) Height: 5' 9\" (1.753 m) PainSc:   3 PainLoc: Generalized Hearing/Vision: No exam data present Learning Assessment:  
 
Learning Assessment 2014 PRIMARY LEARNER Patient HIGHEST LEVEL OF EDUCATION - PRIMARY LEARNER  SOME COLLEGE  
BARRIERS PRIMARY LEARNER NONE  
CO-LEARNER CAREGIVER No  
PRIMARY LANGUAGE ENGLISH  
LEARNER PREFERENCE PRIMARY OTHER (COMMENT) ANSWERED BY Patient RELATIONSHIP SELF Depression Screening: PHQ over the last two weeks 2018 Little interest or pleasure in doing things Not at all Feeling down, depressed, irritable, or hopeless Not at all Total Score PHQ 2 0 Trouble falling or staying asleep, or sleeping too much - Feeling tired or having little energy - Poor appetite, weight loss, or overeating - Feeling bad about yourself - or that you are a failure or have let yourself or your family down - Trouble concentrating on things such as school, work, reading, or watching TV - Moving or speaking so slowly that other people could have noticed; or the opposite being so fidgety that others notice - Thoughts of being better off dead, or hurting yourself in some way - How difficult have these problems made it for you to do your work, take care of your home and get along with others - Fall Risk Assessment:  
 
Fall Risk Assessment, last 12 mths 2018 Able to walk? Yes Fall in past 12 months? Yes Fall with injury? No  
Number of falls in past 12 months 2 Fall Risk Score 2 Abuse Screening:  
 
Abuse Screening Questionnaire 3/21/2018 Do you ever feel afraid of your partner?  Percy Arteaga  
 Are you in a relationship with someone who physically or mentally threatens you? Dontae Perez Is it safe for you to go home? Estee Vuong Coordination of Care Questionaire: 1. Have you been to the ER, urgent care clinic since your last visit? Hospitalized since your last visit? YES. HAMMAD LOVE 9/5/18 for chest inflammation 2. Have you seen or consulted any other health care providers outside of the [address] [place] [address] since your last visit? Include any pap smears or colon screening. NO Advanced Directive: 1. Do you have an Advanced Directive? YES? 
 
2. Would you like information on Advanced Directives?  YES

## 2018-09-25 NOTE — MR AVS SNAPSHOT
[address] [place] [address] [address] [facility] [id number] [phone] Patient: Deandra Calle Sr. MRN: [id number] Visit Information Date & Time Provider Department Dept. Phone Encounter #  
 9/25/2018  1:50 PM Leonid Cardona, [address] [place] [phone] [id number] Follow-up Instructions Return in about 2 months (around 11/25/2018) for 20min follow-up. Upcoming Health Maintenance Date Due Shingrix Vaccine Age 50> (1 of 2) 5/5/2003 EYE EXAM RETINAL OR DILATED Q1 6/5/2018 Influenza Age 5 to Adult 8/1/2018 FOOT EXAM Q1 11/10/2018 MICROALBUMIN Q1 11/10/2018 HEMOGLOBIN A1C Q6M 11/11/2018 LIPID PANEL Q1 2/9/2019 MEDICARE YEARLY EXAM 2/10/2019 GLAUCOMA SCREENING Q2Y 6/5/2019 COLONOSCOPY 4/6/2022 DTaP/Tdap/Td series (3 - Td) 4/20/2025 Allergies as of 9/25/2018  Review Complete On: 9/25/2018 By: Leonid Cardona MD  
  
 Severity Noted Reaction Type Reactions Statins-hmg-coa Reductase Inhibitors Medium 04/20/2015    Myalgia Current Immunizations  Reviewed on 4/20/2015 Name Date Influenza Vaccine 9/8/2016 10:01 AM, 11/23/2015 10:01 AM, 9/29/2014, 10/16/2013 12:00 AM, 10/18/2012 Influenza Vaccine (Quad) PF 9/6/2017 10:16 AM  
 Influenza Vaccine (Tri) Adjuvanted 9/25/2018  2:17 PM  
 Pneumococcal Conjugate (PCV-13) 11/17/2016 Pneumococcal Polysaccharide (PPSV-23) 5/11/2018 10:05 AM, 8/11/2009 12:00 AM  
 TB Skin Test (PPD) Intradermal 2/19/2013 Tdap 4/20/2015 10:12 AM, 2/19/2009 12:00 AM  
 Zoster Vaccine, Live 10/17/2016 Not reviewed this visit You Were Diagnosed With   
  
 Codes Comments Pericarditis associated with rheumatoid arthritis, unspecified chronicity ([facility]    -  Primary ICD-10-CM: I31.9, M06.9 ICD-9-CM: 423.9, 714.0 Encounter for immunization     ICD-10-CM: Z93 ICD-9-CM: V03.89 Vitals BP Pulse Temp Resp Height(growth percentile) Weight(growth percentile) 138/80 (BP 1 Location: Right arm, BP Patient Position: Sitting) 69 95.2 °F (35.1 °C) 14 5' 9\" (1.753 m) 185 lb (83.9 kg) SpO2 BMI Smoking Status 98% 27.32 kg/m2 Former Smoker Vitals History BMI and BSA Data Body Mass Index Body Surface Area  
 27.32 kg/m 2 2.02 m 2 Preferred Pharmacy Pharmacy Name Phone [phone] Your Updated Medication List  
  
   
This list is accurate as of 9/25/18  3:07 PM.  Always use your most recent med list.  
  
  
  
  
 ACCU-CHEK SMARTVIEW CONTRL SOL Soln Generic drug:  Blood Glucose Control, Normal  
USE AS DIRECTED  
  
 acetaminophen 500 mg tablet Commonly known as:  TYLENOL  
500 mg as needed. aspirin 81 mg tablet Take 1 Tab by mouth daily. Blood-Glucose Meter Misc Commonly known as:  ACCU-CHEK IDANIA  
1 Each by Does Not Apply route daily. ergocalciferol 50,000 unit capsule Commonly known as:  ERGOCALCIFEROL Take 1 Cap by mouth every seven (7) days. famotidine 20 mg tablet Commonly known as:  PEPCID Take 20 mg by mouth two (2) times a day. fluticasone 50 mcg/actuation nasal spray Commonly known as:  Vancleave [place] 2 Sprays by Both Nostrils route daily. gabapentin 100 mg capsule Commonly known as:  NEURONTIN Take 2 Caps by mouth nightly. glucose blood VI test strips strip Commonly known as:  ACCU-CHEK SMARTVIEW TEST STRIP  
CHECK BLOOD SUGAR TWICE DAILY. ibuprofen 600 mg tablet Commonly known as:  MOTRIN Take 600 mg by mouth three (3) times daily (with meals). Pericarditis  
  
 ketoconazole 2 % topical cream  
Commonly known as:  NIZORAL Apply 1 Applicator to affected area daily as needed. Lancets Misc Commonly known as:  ACCU-CHEK SOFTCLIX LANCETS Test Blood sugar twice daily leflunomide 20 mg tablet Commonly known as:  Macel Dez Take 1 Tab by mouth daily. loratadine 10 mg Cap Take  by mouth.  
  
 losartan-hydroCHLOROthiazide 50-12.5 mg per tablet Commonly known as:  HYZAAR Take 1 Tab by mouth daily. metFORMIN 1,000 mg tablet Commonly known as:  GLUCOPHAGE  
TAKE 1 TABLET TWICE DAILY WITH MEALS  
  
 red yeast rice extract 600 mg Cap Take 1,800 mg by mouth daily. traMADol 50 mg tablet Commonly known as:  ULTRAM  
TAKE 1 TABLET EVERY 8 HOURS AS NEEDED FOR PAIN  ( MAXIMUM DAILY AMOUNT:  150MG  ) We Performed the Following ADMIN INFLUENZA VIRUS VAC [ [facility]] INFLUENZA VACCINE INACTIVATED (IIV), SUBUNIT, ADJUVANTED, IM [id number] CPT(R)] REFERRAL TO CARDIOLOGY [[facility]] Comments:  
 Please eval & treat. Follow-up for hospitalization for pericarditis in early Sept 2018. Follow-up Instructions Return in about 2 months (around 11/25/2018) for 20min follow-up. To-Do List   
 09/25/2018 Lab:  METABOLIC PANEL, BASIC Referral Information Referral ID Referred By Referred To  
  
 [id number] [facility] [address] [place] [address] Phone: [phone] Fax: [phone] Visits Status Start Date End Date 1 New Request 9/25/18 9/25/19 If your referral has a status of pending review or denied, additional information will be sent to support the outcome of this decision. Patient Instructions To Do: 
· bloodwork on your way out to check on your kidneys · Keep going with your ibuprofen and your famotidine. .. Until the cardiologist weighs in on how long you need to be on that treatment · Cardiology should be calling you by the end of the week to schedule your folllow-up appointment with them · Plan on coming back for FASTING bloodwork in early November (this will include the labwork for Dr. Merlin Sandy) Notes from your doctor: · One of the things we should consider in the future is to maybe take very low dose cholesterol medicine every other day (trying to get you the benefits, without making you feel bad) Introducing [facility] SERVICES! [employer] introduces [employer]Me patient portal. Now you can access parts of your medical record, email your doctor's office, and request medication refills online. 1. In your internet browser, go to [email or web address] 2. Click on the First Time User? Click Here link in the Sign In box. You will see the New Member Sign Up page. 3. Enter your WooMe Access Code exactly as it appears below. You will not need to use this code after youve completed the sign-up process. If you do not sign up before the expiration date, you must request a new code. · W[employer]Me Access Code: [email or web address] Expires: 11/29/2018  3:22 PM 
 
4. Enter the last four digits of your Social Security Number (xxxx) and Date of Birth (mm/dd/yyyy) as indicated and click Submit. You will be taken to the next sign-up page. 5. Create a WooMe ID. This will be your WooMe login ID and cannot be changed, so think of one that is secure and easy to remember. 6. Create a WooMe password. You can change your password at any time. 7. Enter your Password Reset Question and Answer. This can be used at a later time if you forget your password. 8. Enter your e-mail address. You will receive e-mail notification when new information is available in [address] 9. Click Sign Up. You can now view and download portions of your medical record. 10. Click the Download Summary menu link to download a portable copy of your medical information. If you have questions, please visit the Frequently Asked Questions section of the WooMe website. Remember, WooMe is NOT to be used for urgent needs. For medical emergencies, dial 911. Now available from your iPhone and Android! Please provide this summary of care documentation to your next provider. Your primary care clinician is listed as Candi Mead. If you have any questions after today's visit, please call [phone].

## 2018-09-26 LAB
BUN SERPL-MCNC: 8 MG/DL (ref 8–27)
BUN/CREAT SERPL: 12 (ref 10–24)
CALCIUM SERPL-MCNC: 9.8 MG/DL (ref 8.6–10.2)
CHLORIDE SERPL-SCNC: 99 MMOL/L (ref 96–106)
CO2 SERPL-SCNC: 26 MMOL/L (ref 20–29)
CREAT SERPL-MCNC: 0.67 MG/DL (ref 0.76–1.27)
GLUCOSE SERPL-MCNC: 92 MG/DL (ref 65–99)
POTASSIUM SERPL-SCNC: 4.1 MMOL/L (ref 3.5–5.2)
SODIUM SERPL-SCNC: 141 MMOL/L (ref 134–144)

## 2018-09-27 NOTE — PROGRESS NOTES
Please call Danielle Moore, and inform him that his kidney function continues to be excellent. No need to make any changes to his medication regimen at this time. Thanks.

## 2018-09-28 ENCOUNTER — PATIENT OUTREACH (OUTPATIENT)
Dept: FAMILY MEDICINE CLINIC | Age: 63
End: 2018-09-28

## 2018-09-28 NOTE — PROGRESS NOTES
Spoke to patient . He did come to his [facility] follow up appointment. Had his labs completed and his cholesterol levels are fine per Dr Thelma Corcoran even without taking the Lipitor. Denies c/p or SOB next appointment in Nov./2018

## 2018-10-01 ENCOUNTER — TELEPHONE (OUTPATIENT)
Dept: FAMILY MEDICINE CLINIC | Age: 63
End: 2018-10-01

## 2018-10-12 ENCOUNTER — PATIENT OUTREACH (OUTPATIENT)
Dept: FAMILY MEDICINE CLINIC | Age: 63
End: 2018-10-12

## 2018-10-12 NOTE — PROGRESS NOTES
. 
Patient has graduated from the Transitions of Care Coordination  program on 10/12/2018. Patient's symptoms are stable at this time. Patient/family has the ability to self-manage. Care management goals have been completed at this time. No further nurse navigator follow up scheduled. Pt has nurse navigator's contact information for any further questions, concerns, or needs. Patients upcoming visits:  Future Appointments Date Time Provider Gela Mcdonald 11/29/2018 10:00 AM Flora Moreno MD [facility]

## 2018-10-24 ENCOUNTER — TELEPHONE (OUTPATIENT)
Dept: FAMILY MEDICINE CLINIC | Age: 63
End: 2018-10-24

## 2018-10-24 NOTE — TELEPHONE ENCOUNTER
Pt called stating he was seen by his cardiologist on tuesday and she increased the dose for his Hyzaar. (pt unable to tell me the dosage it was increased to). He stated before he starts the new dose he wanted to check with you to see if that was ok.  Please advise

## 2018-10-26 RX ORDER — LOSARTAN POTASSIUM AND HYDROCHLOROTHIAZIDE 12.5; 1 MG/1; MG/1
1 TABLET ORAL DAILY
COMMUNITY
Start: 2018-10-23 | End: 2019-01-21 | Stop reason: SDUPTHER

## 2018-10-26 NOTE — TELEPHONE ENCOUNTER
Reviewed notes as below:    Patient Instructions - JHOAN Jurado Aas - 10/23/2018 2:50 PM EDT  - Decrease Ibuprofen 400mg three times a day  - On 10/30 decrease ibuprofen to 200 three times a day  - On 11/6 decrease ibuprofen to 200 twice a day  - Stop ibuprofen on 11/13  - Stop Hyzaar 50-12.5, Start Hyzaar 100-12.5  - Labs in a week      Please advise Gatito Mack Sr. that that is fine by me, and please reconcile med list in chart accordingly. Thanks.

## 2018-11-01 DIAGNOSIS — M79.10 MYALGIA: Chronic | ICD-10-CM

## 2018-11-01 DIAGNOSIS — E11.9 DIABETES MELLITUS TYPE 2, NONINSULIN DEPENDENT (HCC): Chronic | ICD-10-CM

## 2018-11-01 DIAGNOSIS — M06.9 RHEUMATOID ARTHRITIS INVOLVING MULTIPLE SITES, UNSPECIFIED RHEUMATOID FACTOR PRESENCE: Chronic | ICD-10-CM

## 2018-11-01 DIAGNOSIS — E55.9 VITAMIN D DEFICIENCY: Chronic | ICD-10-CM

## 2018-11-01 DIAGNOSIS — Z78.9 STATIN INTOLERANCE: ICD-10-CM

## 2018-11-01 DIAGNOSIS — E78.5 HYPERLIPIDEMIA WITH TARGET LDL LESS THAN 100: Chronic | ICD-10-CM

## 2018-11-28 NOTE — PROGRESS NOTES
[facility] Primary Care Office Visit - Problem-Oriented : 1955 Yohan Gallardo. is a 70 y.o. male presenting for Chief Complaint Patient presents with  Knee Swelling  
  bilateral  
 Diabetes Follow up Assessment/Plan: 1. Diabetes mellitus type 2, noninsulin dependent ([facility] [address] Unclear control. Getting previously ordered labs done today. Foot exam wnl. Statin intolerant. 
- AMB POC URINE, MICROALBUMIN, SEMIQUANT (3 RESULTS) - AMB POC HEMOGLOBIN A1C 
-  DIABETES FOOT EXAM 
Requesting records for eye exam. 
Key Antihyperglycemic Medications   
    
  
 metFORMIN (GLUCOPHAGE) 1,000 mg tablet  (Taking) TAKE 1 TABLET TWICE DAILY WITH MEALS 2. Rheumatoid arthritis involving multiple sites, unspecified rheumatoid factor presence ([facility] [address] Followed by rheumatology. Getting previously ordered labs done today. 3. Essential hypertension Relatively well controlled. Continue to monitor. Key CAD CHF Meds   
    
  
 losartan-hydroCHLOROthiazide (HYZAAR) 100-12.5 mg per tablet  (Taking) Take 1 Tab by mouth daily. aspirin 81 mg tablet  (Taking) Take 1 Tab by mouth daily. 4. Overweight I have reviewed/discussed the above normal BMI with the patient. I have recommended the following interventions: dietary management education, guidance, and counseling, encourage exercise and monitor weight. Orders & Diagnoses associated with This Encounter: ICD-10-CM ICD-9-CM 1. Diabetes mellitus type 2, noninsulin dependent (HCC) E11.9 250.00  
2. Rheumatoid arthritis involving multiple sites, unspecified rheumatoid factor presence (HCC) M06.9 714.0 3. Essential hypertension I10 401.9 4. Overweight E66.3 278.02 Orders Placed This Encounter  AMB POC URINE, MICROALBUMIN, SEMIQUANT (3 RESULTS)  AMB POC HEMOGLOBIN A1C  
  DIABETES FOOT EXAM  
 
 
 
This document may have been created with the aid of dictation software. Text may contain errors, particularly phonetic errors. Reviewed management plan & instructions with patient, who voiced understanding. Raymundo Brown MD 
Internal Medicine, Family Medicine & Sports Medicine 11/29/2018, 2:26 PM 
 
Patient Instructions (provided in AVS): To Do: 
· Fasting bloodwork on your way out (will send your rheumatologist a copy of your bloodwork). · Keep up the great work Notes from your doctor: · You are up to date on all of your vaccines, including your pneumonia vaccines x 2 History:  
Susan Ricci Sr. is a 70 y.o. male presenting to address: Chief Complaint Patient presents with  Knee Swelling  
  bilateral  
 Diabetes Follow up No specific complaints. Off of NSAIDs. No further symptoms of carditis. Rates 3 of 10 joint pain. \"cold weather\". Rare use of tramadol. Next appt with rheumatology in January. Had DM eye exam done. Compliant with all meds, denies any AE. No hypoglycemic episodes. Past Medical History:  
Diagnosis Date  Diabetes ([facility] [address]  H. pylori infection s/p prevpack  H/O esophagogastroduodenoscopy 3/1/2013  
 irregular z-line, small 1cm submucosal nodule  Hypercholesterolemia  Hypertension  Rheumatoid arthritis(714.0) Past Surgical History:  
Procedure Laterality Date  ENDOSCOPY, COLON, DIAGNOSTIC    
 HX HERNIA REPAIR Right 1998  HX WISDOM TEETH EXTRACTION    
 
 reports that he has quit smoking. he has never used smokeless tobacco. He reports that he does not drink alcohol or use drugs. Social History Social History Narrative  Not on file Social History Tobacco Use Smoking Status Former Smoker Smokeless Tobacco Never Used Family History Problem Relation Age of Onset  Heart Disease Mother  Heart Disease Father Rebeka Shiv Arthritis-rheumatoid Sister  Hypertension Sister  Heart Disease Sister  Heart Disease Brother Allergies Allergen Reactions  Statins-Hmg-Coa Reductase Inhibitors Myalgia Problem List:  
  
Patient Active Problem List  
 Diagnosis  Essential hypertension  
  - meds: losartan/HCTZ 50/12.5  Diabetes mellitus type 2, noninsulin dependent ([facility]  
  - meds: metformin 1000mg BID 
 
- A1c 7.5 (Nov 2015) - A1c 6.5 (4/14/2015) 
 
- foot exam: 7/8/2014 Intolerant of statins.  Rheumatoid arthritis ([facility]  
  -1/16/2017 [Dr. Gonzalez Kind: seropositive long-standing disease on arava monotherapy, reporting RAPID3 16.3; sicca syndrome; r/o Sjogrens, labs, possibly US to eval disease activity 
 
-4/26/2016: [Rheum] refilled leflunomide 20 mg daily, noted that he would be switching to a rheumatologist closer to home 
-2/17/2016: continue on leflunomide, plaquenil, labs: cbc, ESR, CMP, CRP, B hand xrays;  follow up 2 mo Previously followed by Dr. Leslie López (rheum) -- 9/29/2014 [rheum]: cont humira, plaquenil & tramadol; stop prednisone; flu shot given 
-- 7/29/2014 [rheum]: cont humira & plaquenil; reduce prednisone to 2.5mg daily 
-- 4/29/2014 [rheum]: combo of RA & DJD; continue humira, plaquenil & prednisone 5mg daily; added tramadol qhs prn; avoid NSAIDs; recs: check neck US to rule out lymph node swelling (R side, above clavicle) -- Mar 2013: RF = 588  Hyperlipidemia with target LDL less than 100  
  - statin intolerant 
 
-- tot 258 (H), HDL 45, , LDLc 186 (H) (1/10/2015); 10yr ASCVD risk 22.5% 
-- tot 146, , HDL 51, LDLc 72 (7/8/2014) -- Sept 2013: tot 139, LDL 76, HDL 42,   Chronic gastritis  Esophagitis Chronic esophgitis by bx with irregular z-line (Mar 2013) by Dr. Robert Hannon (DLDS).  Type 2 diabetes mellitus with diabetic neuropathy (HCC)  DDD (degenerative disc disease), lumbosacral  
  L-spine XR (Dec 2016): Mild L4-S1 degenerative endplate reactive changes and disc space height loss.  Moderate bilateral facet arthropathy L4-S1. Bilateral oblique views demonstrate moderate right and mild left L5-S1 foraminal stenoses.  Statin intolerance  Right thyroid nodule Followed by Dr. Steffany Zamudio (endo). -- 6/10/2014 [endo]: FNA Bx negative for malignancy  Vitamin D deficiency - VitD 34.4 (7/8/2014) *5/7/2014: VitD 14.6 (L) 
- start 50k 2x/wk  Elevated TSH  Decreased libido  
  -- 3/4/2014: slightly low free testosterone, normal PSA, but slightly elevated TSH  TERRY (obstructive sleep apnea) Followed by Dr. Dillon Alt DR RAPHAEL MACE \[facility]\"" [facility]) 
-- 12/17/2013: overnight sleep study pending  Myalgia *10/9/2014: improved off of [place] [facility] [place] *5/7/2014: sx unchanged off crestor 
 
-- 3/4/2014: continue to hold crestor; advised massage & stretching 
--  (2/21/2014); holding crestor until next appt 
-- aldolase wnl (2/21/2014)  Routine health maintenance  
  -- c-scope: 4/6/2012 by Demetris Weathers ([address] [employer] [facility] [address]), hyperplastic polyps; next due in 10 yrs (Apr 2022)  Neural foraminal stenosis of cervical spine C-spine MRI (Apr 2013): 
- focal R paracentral disc protrusion @ C3-4 with mild cord impingement 
- mild to mod foraminal stenoses, primarily @ C3-4 and C4-5 on right 
- no C1-2 rheumatoid instability  Hemoglobin C trait ([address] Seen on Hgb electrophoresis on 5/29/2013. Medications:  
 
Current Outpatient Medications Medication Sig  
 losartan-hydroCHLOROthiazide (HYZAAR) 100-12.5 mg per tablet Take 1 Tab by mouth daily.  loratadine 10 mg cap Take  by mouth.  glucose blood VI test strips (ACCU-CHEK SMARTVIEW TEST STRIP) strip CHECK BLOOD SUGAR TWICE DAILY.  Lancets (ACCU-CHEK SOFTCLIX LANCETS) misc Test Blood sugar twice daily  gabapentin (NEURONTIN) 100 mg capsule Take 2 Caps by mouth nightly.   
 metFORMIN (GLUCOPHAGE) 1,000 mg tablet TAKE 1 TABLET TWICE DAILY WITH MEALS  
  ergocalciferol (ERGOCALCIFEROL) 50,000 unit capsule Take 1 Cap by mouth every seven (7) days.  Blood-Glucose Meter (ACCU-CHEK IDANIA) misc 1 Each by Does Not Apply route daily.  fluticasone (FLONASE) 50 mcg/actuation nasal spray 2 Sprays by Both Nostrils route daily.  traMADol (ULTRAM) 50 mg tablet TAKE 1 TABLET EVERY 8 HOURS AS NEEDED FOR PAIN  ( MAXIMUM DAILY AMOUNT:  150MG  )  leflunomide (ARAVA) 20 mg tablet Take 1 Tab by mouth daily.  ACCU-CHEK SMARTVIEW CONTRL SOL soln USE AS DIRECTED  red yeast rice extract 600 mg cap Take 1,800 mg by mouth daily.  aspirin 81 mg tablet Take 1 Tab by mouth daily.  famotidine (PEPCID) 20 mg tablet Take 20 mg by mouth two (2) times a day.  ketoconazole (NIZORAL) 2 % topical cream Apply 1 Applicator to affected area daily as needed.  acetaminophen (TYLENOL) 500 mg tablet 500 mg as needed. No current facility-administered medications for this visit. Review of Systems:  
 
Review of Systems Constitutional: Negative for chills and fever. HENT: Negative for ear pain and sore throat. Respiratory: Negative for cough and shortness of breath. Cardiovascular: Negative for chest pain and palpitations. Gastrointestinal: Negative for abdominal pain and heartburn. Genitourinary: Negative for dysuria. Musculoskeletal: Positive for joint pain (chronic, stable). Negative for myalgias. Skin: Negative for rash. Neurological: Negative for speech change, focal weakness and headaches. Endo/Heme/Allergies: Does not bruise/bleed easily. Psychiatric/Behavioral: Negative for depression. The patient is not nervous/anxious and does not have insomnia. Physical Assessment:  
VS:   
Vitals:  
 11/29/18 0958 11/29/18 1023 BP: (!) 153/95 142/85 Pulse: 73 Resp: 18 Temp: 96.3 °F (35.7 °C) TempSrc: Oral   
SpO2: 97% Weight: 188 lb 6.4 oz (85.5 kg) Height: 5' 9\" (1.753 m) PainSc:   3 PainLoc: Knee Physical Exam  
 Constitutional: He is oriented to person, place, and time. He appears well-developed and well-nourished. No distress. HENT:  
Head: Normocephalic and atraumatic. Right Ear: External ear normal.  
Left Ear: External ear normal.  
Mouth/Throat: Oropharynx is clear and moist.  
Eyes: EOM are normal. Pupils are equal, round, and reactive to light. Neck: Normal range of motion. Neck supple. Cardiovascular: Normal rate, regular rhythm and normal heart sounds. No murmur heard. Pulmonary/Chest: Effort normal and breath sounds normal. No respiratory distress. He has no wheezes. Musculoskeletal: Normal range of motion. Neurological: He is alert and oriented to person, place, and time. No cranial nerve deficit. Skin: Skin is warm and dry. He is not diaphoretic. Psychiatric: He has a normal mood and affect. His behavior is normal. Judgment and thought content normal.  
Nursing note reviewed. Diabetic foot exam:  
 
Left Foot: 
 Visual Exam: normal  
 Pulse DP: 2+ (normal) Filament test: normal sensation Vibratory sensation: Vibratory sensation: normal  
   
Right Foot: 
 Visual Exam: normal  
 Pulse DP: 2+ (normal) Filament test: normal sensation Vibratory sensation: Vibratory sensation: normal 
 
 
 
Recent Labs & Imaging: No results found for this or any previous visit (from the past 12 hour(s)). Office Visit on 09/25/2018 Component Date Value Ref Range Status  Glucose 09/25/2018 92  65 - 99 mg/dL Final  
 BUN 09/25/2018 8  8 - 27 mg/dL Final  
 Creatinine 09/25/2018 0.67* 0.76 - 1.27 mg/dL Final  
 GFR est non-AA 09/25/2018 101  >59 mL/min/1.73 Final  
 GFR est AA 09/25/2018 117  >59 mL/min/1.73 Final  
 BUN/Creatinine ratio 09/25/2018 12  10 - 24 Final  
 Sodium 09/25/2018 141  134 - 144 mmol/L Final  
 Potassium 09/25/2018 4.1  3.5 - 5.2 mmol/L Final  
 Chloride 09/25/2018 99  96 - 106 mmol/L Final  
 CO2 09/25/2018 26  20 - 29 mmol/L Final  
  Calcium 09/25/2018 9.8  8.6 - 10.2 mg/dL Final

## 2018-11-29 ENCOUNTER — OFFICE VISIT (OUTPATIENT)
Dept: FAMILY MEDICINE CLINIC | Age: 63
End: 2018-11-29

## 2018-11-29 VITALS
TEMPERATURE: 96.3 F | HEIGHT: 69 IN | SYSTOLIC BLOOD PRESSURE: 142 MMHG | HEART RATE: 73 BPM | BODY MASS INDEX: 27.91 KG/M2 | DIASTOLIC BLOOD PRESSURE: 85 MMHG | WEIGHT: 188.4 LBS | RESPIRATION RATE: 18 BRPM | OXYGEN SATURATION: 97 %

## 2018-11-29 DIAGNOSIS — E66.3 OVERWEIGHT: ICD-10-CM

## 2018-11-29 DIAGNOSIS — M06.9 RHEUMATOID ARTHRITIS INVOLVING MULTIPLE SITES, UNSPECIFIED RHEUMATOID FACTOR PRESENCE: Chronic | ICD-10-CM

## 2018-11-29 DIAGNOSIS — I10 ESSENTIAL HYPERTENSION: Chronic | ICD-10-CM

## 2018-11-29 DIAGNOSIS — E11.9 DIABETES MELLITUS TYPE 2, NONINSULIN DEPENDENT (HCC): Primary | Chronic | ICD-10-CM

## 2018-11-29 LAB
ALBUMIN UR QL STRIP: 30 MG/L
CREATININE, URINE POC: 300 MG/DL
HBA1C MFR BLD HPLC: 6.1 %
MICROALBUMIN/CREAT RATIO POC: <30 MG/G

## 2018-11-29 NOTE — PROGRESS NOTES
Leticia Bernal. is a 70 y.o. male (: 1955) presenting to address: Chief Complaint Patient presents with  Knee Swelling  
  bilateral  
 Diabetes Follow up Vitals:  
 18 1042 BP: (!) 153/95 Pulse: 73 Resp: 18 Temp: 96.3 °F (35.7 °C) TempSrc: Oral  
SpO2: 97% Weight: 188 lb 6.4 oz (85.5 kg) Height: 5' 9\" (1.753 m) PainSc:   3 PainLoc: Knee Hearing/Vision: No exam data present Learning Assessment:  
 
Learning Assessment 2014 PRIMARY LEARNER Patient HIGHEST LEVEL OF EDUCATION - PRIMARY LEARNER  SOME COLLEGE  
BARRIERS PRIMARY LEARNER NONE  
CO-LEARNER CAREGIVER No  
PRIMARY LANGUAGE ENGLISH  
LEARNER PREFERENCE PRIMARY OTHER (COMMENT) ANSWERED BY Patient RELATIONSHIP SELF Depression Screening: PHQ over the last two weeks 2018 Little interest or pleasure in doing things Not at all Feeling down, depressed, irritable, or hopeless Not at all Total Score PHQ 2 0 Trouble falling or staying asleep, or sleeping too much - Feeling tired or having little energy - Poor appetite, weight loss, or overeating - Feeling bad about yourself - or that you are a failure or have let yourself or your family down - Trouble concentrating on things such as school, work, reading, or watching TV - Moving or speaking so slowly that other people could have noticed; or the opposite being so fidgety that others notice - Thoughts of being better off dead, or hurting yourself in some way - How difficult have these problems made it for you to do your work, take care of your home and get along with others - Fall Risk Assessment:  
 
Fall Risk Assessment, last 12 mths 2018 Able to walk? Yes Fall in past 12 months? Yes Fall with injury? No  
Number of falls in past 12 months 2 Fall Risk Score 2 Abuse Screening:  
 
Abuse Screening Questionnaire 3/21/2018 Do you ever feel afraid of your partner?  Demar Infante  
 Are you in a relationship with someone who physically or mentally threatens you? Parry Goldberg Is it safe for you to go home? [facility] Coordination of Care Questionaire: 1. Have you been to the ER, urgent care clinic since your last visit? Hospitalized since your last visit? NO 
 
2. Have you seen or consulted any other health care providers outside of the [address] [place] [address] since your last visit? Include any pap smears or colon screening. NO Advanced Directive: 1. Do you have an Advanced Directive? NO  
2. Would you like information on Advanced Directives?  YES

## 2018-11-29 NOTE — PATIENT INSTRUCTIONS
To Do: 
· Fasting bloodwork on your way out (will send your rheumatologist a copy of your bloodwork). · Keep up the great work Notes from your doctor: · You are up to date on all of your vaccines, including your pneumonia vaccines x 2

## 2018-11-29 NOTE — PROGRESS NOTES
*ATTENTION:  This note has been created by a medical student for educational purposes only. Please do not refer to the content of this note for clinical decision-making, billing, or other purposes. Please see attending physicians note to obtain clinical information on this patient. * Esperanza Valerio. 
71 yo male : 1955 Candy Martinez, 71 yo male, presents to the clinic for 3 month follow up with DMII, HTN, HLD and RA. One month prior he had an episode of acute pericarditis secondary to RA. He has been tapered off the ibuprofen and is no longer experiencing CP. He also denies any SOB, dizziness, or lightheadedness. He reports his RA is fairly stable and is at a 3/10 pain today. He takes tramadol rarely unless the pain is not tolerable with activity. He reports having his diabetic retinal exam in the past few months. He denies HA, vision changes, abdominal pain, lower extremity swelling or numbness/tingling. Past Medical History:  
Diagnosis Date  Diabetes ([facility] [address]  H. pylori infection s/p prevpack  H/O esophagogastroduodenoscopy 3/1/2013  
 irregular z-line, small 1cm submucosal nodule  Hypercholesterolemia  Hypertension  Rheumatoid arthritis(714.0) Current Outpatient Medications:  
  losartan-hydroCHLOROthiazide (HYZAAR) 100-12.5 mg per tablet, Take 1 Tab by mouth daily. , Disp: , Rfl:  
  loratadine 10 mg cap, Take  by mouth., Disp: , Rfl:  
  glucose blood VI test strips (ACCU-CHEK SMARTVIEW TEST STRIP) strip, CHECK BLOOD SUGAR TWICE DAILY. , Disp: 200 Strip, Rfl: 3 
  Lancets (ACCU-CHEK SOFTCLIX LANCETS) misc, Test Blood sugar twice daily, Disp: 200 Each, Rfl: 3 
  gabapentin (NEURONTIN) 100 mg capsule, Take 2 Caps by mouth nightly., Disp: 180 Cap, Rfl: 1 
  metFORMIN (GLUCOPHAGE) 1,000 mg tablet, TAKE 1 TABLET TWICE DAILY WITH MEALS, Disp: 180 Tab, Rfl: 1   ergocalciferol (ERGOCALCIFEROL) 50,000 unit capsule, Take 1 Cap by mouth every seven (7) days. , Disp: 24 Cap, Rfl: 3   Blood-Glucose Meter (ACCU-CHEK IDANIA) misc, 1 Each by Does Not Apply route daily. , Disp: 1 Each, Rfl: 0 
  fluticasone (FLONASE) 50 mcg/actuation nasal spray, 2 Sprays by Both Nostrils route daily. , Disp: 3 Bottle, Rfl: 1 
  traMADol (ULTRAM) 50 mg tablet, TAKE 1 TABLET EVERY 8 HOURS AS NEEDED FOR PAIN  ( MAXIMUM DAILY AMOUNT:  150MG  ), Disp: 45 Tab, Rfl: 1 
  leflunomide (ARAVA) 20 mg tablet, Take 1 Tab by mouth daily. , Disp: 90 Tab, Rfl: 1   ACCU-CHEK SMARTVIEW CONTRL SOL soln, USE AS DIRECTED, Disp: 1 mL, Rfl: 3 
  red yeast rice extract 600 mg cap, Take 1,800 mg by mouth daily. , Disp: , Rfl:  
  aspirin 81 mg tablet, Take 1 Tab by mouth daily. , Disp: 30 Tab, Rfl: 2 
  acetaminophen (TYLENOL) 500 mg tablet, 500 mg as needed. , Disp: , Rfl:   
 
Allergies: Statin - severe myalgia Vitals:  
/85 General: alert, well nourished, not in distress Mental status: oriented to person place and time HEENT: sclera non-icteric, conjunctiva pink, EOMI, PERRLA; thyroid non enlarged, no cervical lymphadenopathy Cardiac: S1 and S2 RRR no murmurs, rubs, gallops; no friction rub Lungs: CTA no wheezes, rhonchi, or rales Abdomen: non distended, BS present, no bruits, no tenderness Extremities: no swelling, post tibial pulses 2+ bilaterally Labs: 
A1c in office: 6.1 Lipid panel, VitD, CK, A1c, BMP, CBC - collection today Assessment/Plan: 1. Pericarditis secondary to RA Resolved 2. RA Continue current regimen with leflunomide Tramadol and tylenol prn Stable 3. HTN Mildly elevated 
hyzaar recently increased from 50 to 100 - recheck in 3 months 4. DM II Stable on metformin Counseled/encouraged healthy diet 5. HLD Statin intolerance Continue red yeast rice extract Possible re-trial of statin in future Counseled on diet 6. Gastritis Stable - avoid NSAIDS - use tylenol prn for RA 7. Vit D deficiency Continue 50,000 units Vit D Check VitD lab today Patient seen & examined independently. Please see separate note for details. Hayden Tobin MD 
Internal Medicine, Family Medicine & Sports Medicine

## 2018-11-30 LAB
25(OH)D3+25(OH)D2 SERPL-MCNC: 46.8 NG/ML (ref 30–100)
ALBUMIN SERPL-MCNC: 4.3 G/DL (ref 3.6–4.8)
ALBUMIN/GLOB SERPL: 1.3 {RATIO} (ref 1.2–2.2)
ALP SERPL-CCNC: 87 IU/L (ref 39–117)
ALT SERPL-CCNC: 23 IU/L (ref 0–44)
AST SERPL-CCNC: 17 IU/L (ref 0–40)
BASOPHILS # BLD AUTO: 0 X10E3/UL (ref 0–0.2)
BASOPHILS NFR BLD AUTO: 0 %
BILIRUB SERPL-MCNC: 0.3 MG/DL (ref 0–1.2)
BUN SERPL-MCNC: 8 MG/DL (ref 8–27)
BUN/CREAT SERPL: 9 (ref 10–24)
CALCIUM SERPL-MCNC: 9.7 MG/DL (ref 8.6–10.2)
CHLORIDE SERPL-SCNC: 98 MMOL/L (ref 96–106)
CHOLEST SERPL-MCNC: 269 MG/DL (ref 100–199)
CK SERPL-CCNC: 133 U/L (ref 24–204)
CO2 SERPL-SCNC: 28 MMOL/L (ref 20–29)
CREAT SERPL-MCNC: 0.89 MG/DL (ref 0.76–1.27)
EOSINOPHIL # BLD AUTO: 0.3 X10E3/UL (ref 0–0.4)
EOSINOPHIL NFR BLD AUTO: 7 %
ERYTHROCYTE [DISTWIDTH] IN BLOOD BY AUTOMATED COUNT: 15.8 % (ref 12.3–15.4)
EST. AVERAGE GLUCOSE BLD GHB EST-MCNC: 137 MG/DL
GLOBULIN SER CALC-MCNC: 3.2 G/DL (ref 1.5–4.5)
GLUCOSE SERPL-MCNC: 102 MG/DL (ref 65–99)
HBA1C MFR BLD: 6.4 % (ref 4.8–5.6)
HCT VFR BLD AUTO: 38.5 % (ref 37.5–51)
HDLC SERPL-MCNC: 44 MG/DL
HGB BLD-MCNC: 13.3 G/DL (ref 13–17.7)
IMM GRANULOCYTES # BLD: 0 X10E3/UL (ref 0–0.1)
IMM GRANULOCYTES NFR BLD: 0 %
INTERPRETATION, 910389: NORMAL
LDLC SERPL CALC-MCNC: 189 MG/DL (ref 0–99)
LYMPHOCYTES # BLD AUTO: 1.8 X10E3/UL (ref 0.7–3.1)
LYMPHOCYTES NFR BLD AUTO: 40 %
Lab: NORMAL
MCH RBC QN AUTO: 25.2 PG (ref 26.6–33)
MCHC RBC AUTO-ENTMCNC: 34.5 G/DL (ref 31.5–35.7)
MCV RBC AUTO: 73 FL (ref 79–97)
MONOCYTES # BLD AUTO: 0.5 X10E3/UL (ref 0.1–0.9)
MONOCYTES NFR BLD AUTO: 12 %
NEUTROPHILS # BLD AUTO: 1.9 X10E3/UL (ref 1.4–7)
NEUTROPHILS NFR BLD AUTO: 41 %
PLATELET # BLD AUTO: 241 X10E3/UL (ref 150–379)
POTASSIUM SERPL-SCNC: 3.9 MMOL/L (ref 3.5–5.2)
PROT SERPL-MCNC: 7.5 G/DL (ref 6–8.5)
RBC # BLD AUTO: 5.27 X10E6/UL (ref 4.14–5.8)
SODIUM SERPL-SCNC: 140 MMOL/L (ref 134–144)
TRIGL SERPL-MCNC: 181 MG/DL (ref 0–149)
VLDLC SERPL CALC-MCNC: 36 MG/DL (ref 5–40)
WBC # BLD AUTO: 4.7 X10E3/UL (ref 3.4–10.8)

## 2019-01-21 NOTE — TELEPHONE ENCOUNTER
Patient called and would like a refill on Losartan and gabapentin. Patient stated cardiologist changed Losartan to 100 mg, and he would like to know if you want him to continue this dose ?      He would like medication sent to [address] [place] [address] 11/29/2018

## 2019-01-22 RX ORDER — LOSARTAN POTASSIUM AND HYDROCHLOROTHIAZIDE 12.5; 1 MG/1; MG/1
1 TABLET ORAL DAILY
Qty: 90 TAB | Refills: 1 | Status: SHIPPED | OUTPATIENT
Start: 2019-01-22 | End: 2019-07-20 | Stop reason: SDUPTHER

## 2019-01-22 RX ORDER — GABAPENTIN 100 MG/1
200 CAPSULE ORAL
Qty: 180 CAP | Refills: 1 | Status: SHIPPED | OUTPATIENT
Start: 2019-01-22 | End: 2019-07-31 | Stop reason: SDUPTHER

## 2019-01-22 NOTE — TELEPHONE ENCOUNTER
Please call Roybn Doanld. and tell him that we will continue on the losartan 100 dosing of his hyzaar. Orders Placed This Encounter    losartan-hydroCHLOROthiazide (HYZAAR) 100-12.5 mg per tablet     Sig: Take 1 Tab by mouth daily. Dispense:  90 Tab     Refill:  1    gabapentin (NEURONTIN) 100 mg capsule     Sig: Take 2 Caps by mouth nightly.      Dispense:  180 Cap     Refill:  1

## 2019-02-28 NOTE — PROGRESS NOTES
Date of Service:  2019 Patient Name:  Aaliyah Galdamez Sr.  
Patient :  1955 This is a Subsequent Medicare Annual Wellness Visit providing Personalized Prevention Plan Services (PPPS) (Performed 12 months after initial AWV and PPPS )  I have reviewed the patient's medical history in detail and updated the computerized patient record. History Past Medical History:  
Diagnosis Date  Diabetes ([address]  H. pylori infection s/p prevpack  H/O esophagogastroduodenoscopy 3/1/2013  
 irregular z-line, small 1cm submucosal nodule  Hypercholesterolemia  Hypertension  Rheumatoid arthritis(714.0) Past Surgical History:  
Procedure Laterality Date  ENDOSCOPY, COLON, DIAGNOSTIC    
 HX HERNIA REPAIR Right 1998  HX WISDOM TEETH EXTRACTION Current Outpatient Medications Medication Sig Dispense Refill  losartan-hydroCHLOROthiazide (HYZAAR) 100-12.5 mg per tablet Take 1 Tab by mouth daily. 90 Tab 1  
 gabapentin (NEURONTIN) 100 mg capsule Take 2 Caps by mouth nightly. 180 Cap 1  
 loratadine 10 mg cap Take  by mouth.  glucose blood VI test strips (ACCU-CHEK SMARTVIEW TEST STRIP) strip CHECK BLOOD SUGAR TWICE DAILY. 200 Strip 3  Lancets (ACCU-CHEK SOFTCLIX LANCETS) misc Test Blood sugar twice daily 200 Each 3  
 metFORMIN (GLUCOPHAGE) 1,000 mg tablet TAKE 1 TABLET TWICE DAILY WITH MEALS 180 Tab 1  
 ergocalciferol (ERGOCALCIFEROL) 50,000 unit capsule Take 1 Cap by mouth every seven (7) days. 24 Cap 3  Blood-Glucose Meter (ACCU-CHEK IDANIA) misc 1 Each by Does Not Apply route daily. 1 Each 0  
 fluticasone (FLONASE) 50 mcg/actuation nasal spray 2 Sprays by Both Nostrils route daily. 3 Bottle 1  
 traMADol (ULTRAM) 50 mg tablet TAKE 1 TABLET EVERY 8 HOURS AS NEEDED FOR PAIN  ( MAXIMUM DAILY AMOUNT:  150MG  ) 45 Tab 1  
 leflunomide (ARAVA) 20 mg tablet Take 1 Tab by mouth daily.  90 Tab 1  
  ACCU-CHEK SMARTVIEW CONTRL SOL soln USE AS DIRECTED 1 mL 3  
 acetaminophen (TYLENOL) 500 mg tablet 500 mg as needed.  red yeast rice extract 600 mg cap Take 1,800 mg by mouth daily.  aspirin 81 mg tablet Take 1 Tab by mouth daily. 30 Tab 2 Allergies Allergen Reactions  Statins-Hmg-Coa Reductase Inhibitors Myalgia Family History Problem Relation Age of Onset  Heart Disease Mother  Heart Disease Father 24 [facility] Arthritis-rheumatoid Sister  Hypertension Sister  Heart Disease Sister  Heart Disease Brother Social History Tobacco Use  Smoking status: Former Smoker  Smokeless tobacco: Never Used Substance Use Topics  Alcohol use: No  
 
Patient Active Problem List  
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  
 Routine health maintenance Z00.00  Myalgia M79.10  
 TERRY (obstructive sleep apnea) G47.33  
 Decreased libido R68.82  Elevated TSH R79.89  Vitamin D deficiency E55.9  Right thyroid nodule E04.1  Essential hypertension I10  Statin intolerance Z78.9  DDD (degenerative disc disease), lumbosacral M51.37  Type 2 diabetes mellitus with diabetic neuropathy (HCC) E11.40 Living situation:  
-- Lives with family Diet, Lifestyle: follows a diabetic diet regularly Exercise level: moderately active Depression Risk Factor Screening:  
 
3 most recent PHQ Screens 9/25/2018 Little interest or pleasure in doing things Not at all Feeling down, depressed, irritable, or hopeless Not at all Total Score PHQ 2 0 Trouble falling or staying asleep, or sleeping too much - Feeling tired or having little energy - Poor appetite, weight loss, or overeating -  
 Feeling bad about yourself - or that you are a failure or have let yourself or your family down - Trouble concentrating on things such as school, work, reading, or watching TV - Moving or speaking so slowly that other people could have noticed; or the opposite being so fidgety that others notice - Thoughts of being better off dead, or hurting yourself in some way - How difficult have these problems made it for you to do your work, take care of your home and get along with others - Alcohol Risk Factor Screening: You do not drink alcohol or very rarely. Functional Ability and Level of Safety:  
 
Hearing Loss Hearing is good. Activities of Daily Living Self-care. Requires assistance with: no ADLs Fall Risk Fall Risk Assessment, last 12 mths 9/25/2018 Able to walk? Yes Fall in past 12 months? Yes Fall with injury? No  
Number of falls in past 12 months 2 Fall Risk Score 2 Abuse Screen Patient is not abused Review of Systems A comprehensive review of systems was negative except for that written in the separate problem-oriented documentation. Physical Examination VS:  
Visit Vitals /85 (BP 1 Location: Right arm, BP Patient Position: Sitting) Pulse 66 Temp 96.4 °F (35.8 °C) (Oral) Resp 14 Ht 5' 9\" (1.753 m) Wt 185 lb 9.6 oz (84.2 kg) SpO2 96% BMI 27.41 kg/m² Hearing Screening [address] [facility] Right ear:   40 40 40  40 Left ear:   40 40 40  40 Visual Acuity Screening Right eye Left eye Both eyes Without correction: 20/40 20/25 20/20 With correction:     
  
 
Evaluation of Cognitive Function: 
Mood/affect:  neutral 
Appearance: age appropriate Family member/caregiver input: none available Patient Care Team: 
Clara Bhagat MD as PCP - General (Family Practice) Roro Wong MD as Consulting Provider (Neurosurgery) Marcelo Jurado MD as Consulting Provider (Gastroenterology) Claude Ida, MD as Consulting Provider (Sleep Medicine) Nya Delgado MD as Consulting Provider (Endocrinology) Jairo Causey OD as Consulting Provider (Optometry) Mary Oscar MD as Consulting Provider (Ophthalmology) Suzan Pelaez MD as Consulting Provider (Gastroenterology) Josee Horvath MD (Rheumatology) Advice/Counseling Education and counseling provided: 
Are appropriate based on today's review and evaluation End-of-Life planning (with patient's consent) Pneumococcal Vaccine Prostate cancer screening tests (PSA, covered annually) Colorectal cancer screening tests Cardiovascular screening blood test 
Screening for glaucoma Medical nutrition therapy for individuals with diabetes or renal disease Diabetes outpatient self-management training services Assessment/Plan ICD-10-CM ICD-9-CM 1. Medicare annual wellness visit, subsequent Z00.00 V70.0 2. Screening for alcoholism Z13.39 V79.1 AR ANNUAL ALCOHOL SCREEN 15 MIN 3. Screening for depression Z13.31 V79.0 [facility] was provided a written 5-year personalized preventive services plan, which was included in his AVS. Master Oliveros MD 
Internal Medicine, Family Medicine & Sports Medicine

## 2019-02-28 NOTE — PROGRESS NOTES
[employer] [facility] Office Visit - Problem-Oriented (Separate from the [facility] Wellness Visit) : 1955 Alberto Rose is a 70 y.o. male Assessment/Plan: 4. Type 2 diabetes mellitus with diabetic neuropathy, without long-term current use of insulin ([facility] [address] Well controlled. A1c 6.3 No change to current regimen. 
- METABOLIC PANEL, COMPREHENSIVE 
- HEMOGLOBIN A1C WITH EAG Key Antihyperglycemic Medications   
    
  
 metFORMIN (GLUCOPHAGE) 1,000 mg tablet  (Taking) TAKE 1 TABLET TWICE DAILY WITH MEALS 5. Rheumatoid arthritis involving multiple sites, unspecified rheumatoid factor presence ([facility] [address] Currently reports ongoing joint pain, which did not improve with recent steroid burst. 
Followed by rheumatology. - CBC WITH AUTOMATED DIFF 
- METABOLIC PANEL, COMPREHENSIVE 
- SED RATE (ESR) 7. Essential hypertension Ongoing, well controlled. No change to current regimen. Key CAD CHF Meds   
    
  
 losartan-hydroCHLOROthiazide (HYZAAR) 100-12.5 mg per tablet  (Taking) Take 1 Tab by mouth daily. aspirin 81 mg tablet  (Taking) Take 1 Tab by mouth daily. 8. Vitamin D deficiency Ongoing, controlled. VitD 41.3. 
- ergocalciferol (ERGOCALCIFEROL) 50,000 unit capsule; Take 1 Cap by mouth every seven (7) days. Dispense: 24 Cap; Refill: 3 
- VITAMIN D, 25 HYDROXY 9. Urinary incontinence, unspecified type Initial encounter. Reports only 1 single episode. Completed UDI6/IIQ7 and IPSS today and entered into chart. Patient is comfortable with observing at this time. - AMB POC URINALYSIS DIP STICK AUTO W/O MICRO 10. Incontinence of feces, unspecified fecal incontinence type Initial encounter. Reports only 1 single episode. Maybe 2/2 self-limited gastroenteritis? Patient is comfortable with observing at this time. 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 Not anemic. Orders & Diagnoses associated with This Encounter: ICD-10-CM ICD-9-CM 4. Type 2 diabetes mellitus with diabetic neuropathy, without long-term current use of insulin ([facility]) E11.40 250.60  
5. Rheumatoid arthritis involving multiple sites, unspecified rheumatoid factor presence ([facility]) M06.9 714.0 6. Diabetes mellitus type 2, noninsulin dependent (HCC) E11.9 250.00  
7. Essential hypertension I10 401.9 8. Vitamin D deficiency E55.9 268.9  
9. Urinary incontinence, unspecified type R32 788.30  
10. Incontinence of feces, unspecified fecal incontinence type R15.9 787.60  
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  
 METABOLIC PANEL, COMPREHENSIVE  
 HEMOGLOBIN A1C WITH EAG  
 VITAMIN D, 25 HYDROXY  SED RATE (ESR)  AMB POC URINALYSIS DIP STICK AUTO W/O MICRO  metFORMIN (GLUCOPHAGE) 1,000 mg tablet Sig: TAKE 1 TABLET TWICE DAILY WITH MEALS Dispense:  180 Tab Refill:  1  ergocalciferol (ERGOCALCIFEROL) 50,000 unit capsule Sig: Take 1 Cap by mouth every seven (7) days. Dispense:  24 Cap Refill:  3 Enrique Krause MD 
Internal Medicine, Family Medicine & Sports Medicine 3/1/2019 Patient Instructions (included in AVS): To Do: 
· Let the  you need labwork on your way out · Please continue to monitor your bowel and bladder symptoms, and if they worsen, let me know. .. And we can get you to the respective specialist 
 
Medicare Wellness Notes: 
· Included below is some information about Advance Directives. There is also an excellent website for it for the state of [place] ([email or web address]); if you end up making an Advance Directive, bring me a copy so I can put it in your medical record · We did your medicare wellness visit today. Below is your \"5 year preventive services plan\" · Per our records, you are behind on some immunizations.   Since you have Medicare, it would be more affordable for you to obtain this immunizations from your local commerical pharmacy (since your Medicare Part D will cover them). Please bring this to your local pharmacy to show them. If you decide to get your vaccines, please ask them to fax our office a copy of the records so our information is also up-to-date. My direct fax is [phone] Health Maintenance Due Topic Date Due  Shingles Vaccine (1 of 2) 05/05/2003 History:  
Ariel Dickson Sr. is a 70 y.o. male presenting to address: Chief Complaint Patient presents with Eliezer Prior Annual Wellness Visit  Recently joints have been particularly bothersome. Reports he had no difference after doing the prednisone burst.  \"I don't feel good everyday\". Most certainly feels worse when rainy & cold, better when rocío outside. Next appt with rheum in May. Couple weeks of ago, incontinent of urine x 1, completely emptying his bladder. Hasn't happened since. Denies any issues with weak stream, dysuria, hematuria. Denies any urgency or frequency currently. Generally nocturia 0-1x/night. Deliberately decreases PO fluid intake in the evenings. Also notes that he had an episode of being incontinent of feces x 1 as well. Was completely unrelated to the episode of urinary incontinence. Was very loose. Generally doesn't have issues with BM, usually goes 1-2x/day, generally [place] type 5. Very rarely (1-2x/mo), has [place] type 7 BM. SUZANNE 6 and IIQ 7  3/1/2019 SUZANNE 1 Freq urination 3  
SUZANNE 2 Leak - urgency 0  
SUZANNE 3 Leak - activity 0  
SUZANNE 4 Small leak 0  
SUZANNE 5 Difficulty empty 0  
SUZANNE 6 Pain abd or pelvis 0  
SUZANNE 6 Score 17 IIQ 1 Affected chores 0 IIQ 2 Affected recreation 0 IIQ 3 Affected entertainment 0 IIQ 4 Affected travel 0 IIQ 5 Affected social 0 IIQ 6 Affected emotional 0 IIQ 7 Frustration 3 IIQ 7 Score 14 I-PSS score = 3 [see scanned document] [ see AWV documentation for pertinent PMHx, PSHx, FHx, allergies & meds] Problem List:  
 [ see AWV documentation for active problem list ] Review of Systems:  
 
(only positive ROS in this note, all negatives included in  [address] documentation) Review of Systems Gastrointestinal: Positive for diarrhea (episode of stool incontinence). Physical Assessment:  
VS:  [ see AWV documentation for Vital Signs ] Physical Exam  
Constitutional: He is oriented to person, place, and time. He appears well-developed and well-nourished. No distress. HENT:  
Head: Normocephalic and atraumatic. Right Ear: External ear normal.  
Left Ear: External ear normal.  
Mouth/Throat: Oropharynx is clear and moist.  
Eyes: EOM are normal. Pupils are equal, round, and reactive to light. Neck: Normal range of motion. Neck supple. Cardiovascular: Normal rate, regular rhythm and normal heart sounds. No murmur heard. Pulmonary/Chest: Effort normal and breath sounds normal. No respiratory distress. He has no wheezes. Abdominal: Soft. Bowel sounds are normal. There is no tenderness. There is no rigidity, no rebound and no CVA tenderness. Musculoskeletal: Normal range of motion. Neurological: He is alert and oriented to person, place, and time. No cranial nerve deficit. Skin: Skin is warm and dry. He is not diaphoretic. Psychiatric: He has a normal mood and affect. His behavior is normal. Judgment and thought content normal.  
Nursing note reviewed. Records Review:  
 
 
Rheum (1/23/2019): 
 
  
 
 
 
Cardiology (1/22/2019): Assessment & Plan: 1. Pericarditis ? Viral etiology -- s/p 4 months of colchicine. Has been off colchicine for 3 weeks. No recurrent symptoms. We will continue to monitor clinically. The patient's request he would like to be seen again in 6 months. Follow up will be scheduled in: 6 months Healthy lifestyle has been encouraged including avoidance of tobacco, heart healthy diet, regular exercise and maintaining an optimal BMI. The patient has been encouraged to seek emergency care in the interim should signs or symptoms dictate. The patient has also been encouraged to contact the office for any concerns or questions Problem List: 1. Pericarditis 1. Echo 2018:  EF 71%. No WMA.  PAP 23mmHg. No pericardial effusion 2. Atypical chest pain 1. H/o negative exercise stress test prior to  3. Essential Hypertension 4. Diabetes Mellitus Type II  
5. Hypercholesteremia 6. Rheumatoid arthritis 7. Obstructive sleep apnea 8. Former tobacco use 9. Family history of heart disease - brother  in  of MI 
 
HPI: 
Tanner Retana is a 70 y.o. male with a PMH as noted above who presents today for follow up. Was last seen in this office by JHOAN Girard on 10/23/18. At that time he presented after hospitalization for pericarditis after which she was started on NSAIDs and colchicine. He was instructed to wean off his ibuprofen and continue colchicine for 3-4 months. His blood pressure was elevated and his losartan was increased from 50 mg to 100 mg. He also mentions some atypical chest pain that he gets during the colder winter months that is reproducible with palpation it is not exertional. 
 
Today He is feeling well and has not had any recurrent symptoms of pericarditis. He has been off of colchicine since the beginning of the month. His main complaint is diffuse arthritis that has exacerbated with the cold weather. Pt denies chest pain, dyspnea, palpitations, orthopnea, PND, edema, dizziness, and n/v. Recent Labs & Imaging:  
 
Results for Aishwarya Baca (MRN [id number]) Ref. Range 3/1/2019 10:38 Color (UA POC) Unknown Yellow Clarity (UA POC) Unknown Clear Specific gravity (UA POC) Latest Ref Range: 1.001 - 1.035  1.030  
pH (UA POC) Latest Ref Range: 4.6 - 8.0  5.5 Protein (UA POC) Latest Ref Range: Negative  1+ Glucose (UA POC) Latest Ref Range: Negative  Negative Ketones (UA POC) Latest Ref Range: Negative  Negative Blood (UA POC) Latest Ref Range: Negative  Negative Bilirubin (UA POC) Latest Ref Range: Negative  1+ Urobilinogen (UA POC) Latest Ref Range: 0.2 - 1  0.2 mg/dL Nitrites (UA POC) Latest Ref Range: Negative  Negative Leukocyte esterase (UA POC) Latest Ref Range: Negative  Negative Office Visit on 11/29/2018 Component Date Value Ref Range Status  ALBUMIN, URINE POC 11/29/2018 30  Negative mg/L Final  
 CREATININE, URINE POC 11/29/2018 300  mg/dL Final  
 Microalbumin/creat ratio (POC) 11/29/2018 <30  <30 MG/G Final  
 Hemoglobin A1c (POC) 11/29/2018 6.1  % Final  
Orders Only on 11/01/2018 Component Date Value Ref Range Status  WBC 11/29/2018 4.7  3.4 - 10.8 x10E3/uL Final  
 RBC 11/29/2018 5.27  4. 14 - 5.80 x10E6/uL Final  
 HGB 11/29/2018 13.3  13.0 - 17.7 g/dL Final  
 HCT 11/29/2018 38.5  37.5 - 51.0 % Final  
 MCV 11/29/2018 73* 79 - 97 fL Final  
 MCH 11/29/2018 25.2* 26.6 - 33.0 pg Final  
 MCHC 11/29/2018 34.5  31.5 - 35.7 g/dL Final  
 RDW 11/29/2018 15.8* 12.3 - 15.4 % Final  
 PLATELET 25/23/7520 889  150 - 379 x10E3/uL Final  
 NEUTROPHILS 11/29/2018 41  Not Estab. % Final  
 Lymphocytes 11/29/2018 40  Not Estab. % Final  
 MONOCYTES 11/29/2018 12  Not Estab. % Final  
 EOSINOPHILS 11/29/2018 7  Not Estab. % Final  
 BASOPHILS 11/29/2018 0  Not Estab. % Final  
 ABS. NEUTROPHILS 11/29/2018 1.9  1.4 - 7.0 x10E3/uL Final  
 Abs Lymphocytes 11/29/2018 1.8  0.7 - 3.1 x10E3/uL Final  
 ABS. MONOCYTES 11/29/2018 0.5  0.1 - 0.9 x10E3/uL Final  
 ABS. EOSINOPHILS 11/29/2018 0.3  0.0 - 0.4 x10E3/uL Final  
 ABS. BASOPHILS 11/29/2018 0.0  0.0 - 0.2 x10E3/uL Final  
 IMMATURE GRANULOCYTES 11/29/2018 0  Not Estab. % Final  
 ABS. IMM.  GRANS. 11/29/2018 0.0  0.0 - 0.1 x10E3/uL Final  
 Glucose 11/29/2018 102* 65 - 99 mg/dL Final  
 BUN 11/29/2018 8  8 - 27 mg/dL Final  
  Creatinine 11/29/2018 0.89  0.76 - 1.27 mg/dL Final  
 GFR est non-AA 11/29/2018 90  >59 mL/min/1.73 Final  
 GFR est AA 11/29/2018 104  >59 mL/min/1.73 Final  
 BUN/Creatinine ratio 11/29/2018 9* 10 - 24 Final  
 Sodium 11/29/2018 140  134 - 144 mmol/L Final  
 Potassium 11/29/2018 3.9  3.5 - 5.2 mmol/L Final  
 Chloride 11/29/2018 98  96 - 106 mmol/L Final  
 CO2 11/29/2018 28  20 - 29 mmol/L Final  
 Calcium 11/29/2018 9.7  8.6 - 10.2 mg/dL Final  
 Protein, total 11/29/2018 7.5  6.0 - 8.5 g/dL Final  
 Albumin 11/29/2018 4.3  3.6 - 4.8 g/dL Final  
 GLOBULIN, TOTAL 11/29/2018 3.2  1.5 - 4.5 g/dL Final  
 A-G Ratio 11/29/2018 1.3  1.2 - 2.2 Final  
 Bilirubin, total 11/29/2018 0.3  0.0 - 1.2 mg/dL Final  
 Alk. phosphatase 11/29/2018 87  39 - 117 IU/L Final  
 AST (SGOT) 11/29/2018 17  0 - 40 IU/L Final  
 ALT (SGPT) 11/29/2018 23  0 - 44 IU/L Final  
 Hemoglobin A1c 11/29/2018 6.4* 4.8 - 5.6 % Final  
 Comment:          Prediabetes: 5.7 - 6.4 Diabetes: >6.4 Glycemic control for adults with diabetes: <7.0  Estimated average glucose 11/29/2018 137  mg/dL Final  
 Cholesterol, total 11/29/2018 269* 100 - 199 mg/dL Final  
 Triglyceride 11/29/2018 181* 0 - 149 mg/dL Final  
 HDL Cholesterol 11/29/2018 44  >39 mg/dL Final  
 VLDL, calculated 11/29/2018 36  5 - 40 mg/dL Final  
 LDL, calculated 11/29/2018 189* 0 - 99 mg/dL Final  
 Creatine Kinase,Total 11/29/2018 133  24 - 204 U/L Final  
 VITAMIN D, 25-HYDROXY 11/29/2018 46.8  30.0 - 100.0 ng/mL Final  
Office Visit on 09/25/2018 Component Date Value Ref Range Status  Glucose 09/25/2018 92  65 - 99 mg/dL Final  
 BUN 09/25/2018 8  8 - 27 mg/dL Final  
 Creatinine 09/25/2018 0.67* 0.76 - 1.27 mg/dL Final  
 GFR est non-AA 09/25/2018 101  >59 mL/min/1.73 Final  
 GFR est AA 09/25/2018 117  >59 mL/min/1.73 Final  
 BUN/Creatinine ratio 09/25/2018 12  10 - 24 Final  
  Sodium 09/25/2018 141  134 - 144 mmol/L Final  
 Potassium 09/25/2018 4.1  3.5 - 5.2 mmol/L Final  
 Chloride 09/25/2018 99  96 - 106 mmol/L Final  
 CO2 09/25/2018 26  20 - 29 mmol/L Final  
 Calcium 09/25/2018 9.8  8.6 - 10.2 mg/dL Final

## 2019-03-01 ENCOUNTER — OFFICE VISIT (OUTPATIENT)
Dept: FAMILY MEDICINE CLINIC | Age: 64
End: 2019-03-01

## 2019-03-01 VITALS
BODY MASS INDEX: 27.49 KG/M2 | RESPIRATION RATE: 14 BRPM | WEIGHT: 185.6 LBS | HEART RATE: 66 BPM | SYSTOLIC BLOOD PRESSURE: 138 MMHG | TEMPERATURE: 96.4 F | OXYGEN SATURATION: 96 % | HEIGHT: 69 IN | DIASTOLIC BLOOD PRESSURE: 85 MMHG

## 2019-03-01 DIAGNOSIS — E55.9 VITAMIN D DEFICIENCY: ICD-10-CM

## 2019-03-01 DIAGNOSIS — Z13.31 SCREENING FOR DEPRESSION: ICD-10-CM

## 2019-03-01 DIAGNOSIS — R15.9 INCONTINENCE OF FECES, UNSPECIFIED FECAL INCONTINENCE TYPE: ICD-10-CM

## 2019-03-01 DIAGNOSIS — R32 URINARY INCONTINENCE, UNSPECIFIED TYPE: ICD-10-CM

## 2019-03-01 DIAGNOSIS — E11.40 TYPE 2 DIABETES MELLITUS WITH DIABETIC NEUROPATHY, WITHOUT LONG-TERM CURRENT USE OF INSULIN (HCC): ICD-10-CM

## 2019-03-01 DIAGNOSIS — Z00.00 MEDICARE ANNUAL WELLNESS VISIT, SUBSEQUENT: Primary | ICD-10-CM

## 2019-03-01 DIAGNOSIS — M06.9 RHEUMATOID ARTHRITIS INVOLVING MULTIPLE SITES, UNSPECIFIED RHEUMATOID FACTOR PRESENCE: ICD-10-CM

## 2019-03-01 DIAGNOSIS — E11.9 DIABETES MELLITUS TYPE 2, NONINSULIN DEPENDENT (HCC): Chronic | ICD-10-CM

## 2019-03-01 DIAGNOSIS — I10 ESSENTIAL HYPERTENSION: Chronic | ICD-10-CM

## 2019-03-01 DIAGNOSIS — D58.2 HEMOGLOBIN C TRAIT (HCC): ICD-10-CM

## 2019-03-01 DIAGNOSIS — Z13.39 SCREENING FOR ALCOHOLISM: ICD-10-CM

## 2019-03-01 LAB
BILIRUB UR QL STRIP: ABNORMAL
GLUCOSE UR-MCNC: NEGATIVE MG/DL
KETONES P FAST UR STRIP-MCNC: NEGATIVE MG/DL
PH UR STRIP: 5.5 [PH] (ref 4.6–8)
PROT UR QL STRIP: ABNORMAL
SP GR UR STRIP: 1.03 (ref 1–1.03)
UA UROBILINOGEN AMB POC: ABNORMAL (ref 0.2–1)
URINALYSIS CLARITY POC: CLEAR
URINALYSIS COLOR POC: YELLOW
URINE BLOOD POC: NEGATIVE
URINE LEUKOCYTES POC: NEGATIVE
URINE NITRITES POC: NEGATIVE

## 2019-03-01 RX ORDER — ERGOCALCIFEROL 1.25 MG/1
50000 CAPSULE ORAL
Qty: 24 CAP | Refills: 3 | Status: SHIPPED | OUTPATIENT
Start: 2019-03-01 | End: 2019-09-13

## 2019-03-01 RX ORDER — METFORMIN HYDROCHLORIDE 1000 MG/1
TABLET ORAL
Qty: 180 TAB | Refills: 1 | Status: SHIPPED | OUTPATIENT
Start: 2019-03-01 | End: 2019-09-13 | Stop reason: SDUPTHER

## 2019-03-01 RX ORDER — ERGOCALCIFEROL 1.25 MG/1
50000 CAPSULE ORAL
Qty: 24 CAP | Refills: 3 | Status: SHIPPED | OUTPATIENT
Start: 2019-03-01 | End: 2019-03-01 | Stop reason: SDUPTHER

## 2019-03-01 NOTE — PATIENT INSTRUCTIONS
To Do: 
· Let the  you need labwork on your way out · Please continue to monitor your bowel and bladder symptoms, and if they worsen, let me know. .. And we can get you to the respective specialist 
Medicare Wellness Notes: 
· Included below is some information about Advance Directives. There is also an excellent website for it for the state of [place] ([email or web address]); if you end up making an Advance Directive, bring me a copy so I can put it in your medical record · We did your medicare wellness visit today. Below is your \"5 year preventive services plan\" · Per our records, you are behind on some immunizations. Since you have Medicare, it would be more affordable for you to obtain this immunizations from your local commerical pharmacy (since your Medicare Part D will cover them). Please bring this to your local pharmacy to show them. If you decide to get your vaccines, please ask them to fax our office a copy of the records so our information is also up-to-date. My direct fax is [phone] Health Maintenance Due Topic Date Due  Shingles Vaccine (1 of 2) 05/05/2003

## 2019-03-02 LAB
25(OH)D3+25(OH)D2 SERPL-MCNC: 41.3 NG/ML (ref 30–100)
ALBUMIN SERPL-MCNC: 4.3 G/DL (ref 3.6–4.8)
ALBUMIN/GLOB SERPL: 1.2 {RATIO} (ref 1.2–2.2)
ALP SERPL-CCNC: 97 IU/L (ref 39–117)
ALT SERPL-CCNC: 20 IU/L (ref 0–44)
AST SERPL-CCNC: 17 IU/L (ref 0–40)
BASOPHILS # BLD AUTO: 0 X10E3/UL (ref 0–0.2)
BASOPHILS NFR BLD AUTO: 1 %
BILIRUB SERPL-MCNC: 0.3 MG/DL (ref 0–1.2)
BUN SERPL-MCNC: 11 MG/DL (ref 8–27)
BUN/CREAT SERPL: 14 (ref 10–24)
CALCIUM SERPL-MCNC: 9.6 MG/DL (ref 8.6–10.2)
CHLORIDE SERPL-SCNC: 102 MMOL/L (ref 96–106)
CO2 SERPL-SCNC: 24 MMOL/L (ref 20–29)
CREAT SERPL-MCNC: 0.8 MG/DL (ref 0.76–1.27)
EOSINOPHIL # BLD AUTO: 0.3 X10E3/UL (ref 0–0.4)
EOSINOPHIL NFR BLD AUTO: 6 %
ERYTHROCYTE [DISTWIDTH] IN BLOOD BY AUTOMATED COUNT: 15.6 % (ref 12.3–15.4)
ERYTHROCYTE [SEDIMENTATION RATE] IN BLOOD BY WESTERGREN METHOD: 88 MM/HR (ref 0–30)
EST. AVERAGE GLUCOSE BLD GHB EST-MCNC: 134 MG/DL
GLOBULIN SER CALC-MCNC: 3.5 G/DL (ref 1.5–4.5)
GLUCOSE SERPL-MCNC: 94 MG/DL (ref 65–99)
HBA1C MFR BLD: 6.3 % (ref 4.8–5.6)
HCT VFR BLD AUTO: 40.5 % (ref 37.5–51)
HGB BLD-MCNC: 13.7 G/DL (ref 13–17.7)
IMM GRANULOCYTES # BLD AUTO: 0 X10E3/UL (ref 0–0.1)
IMM GRANULOCYTES NFR BLD AUTO: 0 %
LYMPHOCYTES # BLD AUTO: 1.6 X10E3/UL (ref 0.7–3.1)
LYMPHOCYTES NFR BLD AUTO: 39 %
MCH RBC QN AUTO: 25.7 PG (ref 26.6–33)
MCHC RBC AUTO-ENTMCNC: 33.8 G/DL (ref 31.5–35.7)
MCV RBC AUTO: 76 FL (ref 79–97)
MONOCYTES # BLD AUTO: 0.4 X10E3/UL (ref 0.1–0.9)
MONOCYTES NFR BLD AUTO: 9 %
NEUTROPHILS # BLD AUTO: 1.8 X10E3/UL (ref 1.4–7)
NEUTROPHILS NFR BLD AUTO: 45 %
PLATELET # BLD AUTO: 289 X10E3/UL (ref 150–379)
POTASSIUM SERPL-SCNC: 4 MMOL/L (ref 3.5–5.2)
PROT SERPL-MCNC: 7.8 G/DL (ref 6–8.5)
RBC # BLD AUTO: 5.33 X10E6/UL (ref 4.14–5.8)
SODIUM SERPL-SCNC: 142 MMOL/L (ref 134–144)
WBC # BLD AUTO: 4 X10E3/UL (ref 3.4–10.8)

## 2019-03-03 NOTE — ACP (ADVANCE CARE PLANNING)
Advance Care Planning (ACP) Provider Conversation Snapshot    Date of ACP Conversation: 3/1/2019  Persons included in Conversation:  patient  Length of ACP Conversation in minutes:  <16 minutes (Non-Billable)    Authorized Decision Maker (if patient is incapable of making informed decisions): This person is:    Other Legally Authorized Decision Maker (e.g. Next of Kin)            For Patients with Decision Making Capacity:   Values/Goals: Exploration of values, goals, and preferences if recovery is not expected, even with continued medical treatment in the event of:  Imminent death  Severe, permanent brain injury    Conversation Outcomes / Follow-Up Plan:   Recommended completion of Advance Directive form after review of ACP materials and conversation with prospective healthcare agent

## 2019-03-06 RX ORDER — BLOOD-GLUCOSE METER
EACH MISCELLANEOUS
Qty: 1 EACH | Refills: 0 | Status: SHIPPED | OUTPATIENT
Start: 2019-03-06 | End: 2020-12-17 | Stop reason: SDUPTHER

## 2019-03-06 RX ORDER — ISOPROPYL ALCOHOL 70 ML/100ML
SWAB TOPICAL
Qty: 200 PAD | Refills: 3 | Status: SHIPPED | OUTPATIENT
Start: 2019-03-06 | End: 2021-01-13 | Stop reason: SDUPTHER

## 2019-03-06 RX ORDER — LANCETS
EACH MISCELLANEOUS
Qty: 200 EACH | Refills: 3 | Status: SHIPPED | OUTPATIENT
Start: 2019-03-06

## 2019-03-06 NOTE — TELEPHONE ENCOUNTER
Received fax from [address] [place] [address] for a new requested for a new blood sugar machine.

## 2019-03-15 NOTE — PROGRESS NOTES
Result letter prepped & mailed with the following msg:  ------  Your labs show that your blood sugar control remains excellent. Blood count is good, as well as kidney function, liver function & electrolytes. VitD level is good. Your ESR (sed rate) is a bit up, which might be evidence of increased activity of your rheumatoid arthritis. I have already sent a copy of these results to your rheumatologist for him to review.

## 2019-04-05 ENCOUNTER — OFFICE VISIT (OUTPATIENT)
Dept: FAMILY MEDICINE CLINIC | Age: 64
End: 2019-04-05

## 2019-04-05 VITALS
WEIGHT: 188.4 LBS | BODY MASS INDEX: 27.91 KG/M2 | OXYGEN SATURATION: 98 % | SYSTOLIC BLOOD PRESSURE: 144 MMHG | HEIGHT: 69 IN | TEMPERATURE: 97.6 F | HEART RATE: 75 BPM | DIASTOLIC BLOOD PRESSURE: 86 MMHG | RESPIRATION RATE: 18 BRPM

## 2019-04-05 DIAGNOSIS — M51.37 DDD (DEGENERATIVE DISC DISEASE), LUMBOSACRAL: Chronic | ICD-10-CM

## 2019-04-05 DIAGNOSIS — M06.9 RHEUMATOID ARTHRITIS INVOLVING MULTIPLE SITES, UNSPECIFIED RHEUMATOID FACTOR PRESENCE: Chronic | ICD-10-CM

## 2019-04-05 DIAGNOSIS — R10.9 FLANK PAIN: Primary | ICD-10-CM

## 2019-04-05 LAB
BILIRUB UR QL STRIP: NEGATIVE
GLUCOSE UR-MCNC: NEGATIVE MG/DL
KETONES P FAST UR STRIP-MCNC: NEGATIVE MG/DL
PH UR STRIP: 6 [PH] (ref 4.6–8)
PROT UR QL STRIP: NEGATIVE
SP GR UR STRIP: 1.01 (ref 1–1.03)
UA UROBILINOGEN AMB POC: NORMAL (ref 0.2–1)
URINALYSIS CLARITY POC: CLEAR
URINALYSIS COLOR POC: YELLOW
URINE BLOOD POC: NEGATIVE
URINE LEUKOCYTES POC: NEGATIVE
URINE NITRITES POC: NEGATIVE

## 2019-04-05 NOTE — PROGRESS NOTES
Lorna Welsh is a 70 y.o. male (: 1955) for sick visit:     Chief Complaint   Patient presents with    Back Pain    Flank Pain       Vitals:    19 0917   BP: 144/86   Pulse: 75   Resp: 18   Temp: 97.6 °F (36.4 °C)   TempSrc: Oral   SpO2: 98%   Weight: 188 lb 6.4 oz (85.5 kg)   Height: 5' 9\" (1.753 m)   PainSc:   3   PainLoc: Back         Coordination of Care Questionaire:   1. Have you been to the ER, urgent care clinic since your last visit? Hospitalized since your last visit? no    2. Have you seen or consulted any other health care providers outside of the [address] [place] [address] since your last visit? Include any pap smears or colon screening.   no    Health Maintenance      Health Maintenance Due   Topic Date Due    Shingrix Vaccine Age 50> (1 of 2) 2003    EYE EXAM RETINAL OR DILATED  2018    GLAUCOMA SCREENING Q2Y  2019

## 2019-04-05 NOTE — PROGRESS NOTES
PRE-VISIT PLANNING:     PATIENT NAME:  Lissette Lakhani Sr.   :  1955   PCP:  Eugenia Moulton MD     Future Appointments   Date Time Provider Gela Mcdonald   2019  9:15 AM MD LORI Jain   2019  8:00 AM MD LORI Lutz Lisa Silverman Sr. is a patient of Eugenia Moulton MD who is scheduled for an office visit with Percy Blair MD on 2019 for evaluation of flank pain. Encounter opened prior to visit to complete pre-visit planning. Health maintenance due topics to be reviewed with PCP     Health Maintenance Due   Topic Date Due    Shingrix Vaccine Age 49> (1 of 2) 2003    EYE EXAM RETINAL OR DILATED  2018    GLAUCOMA SCREENING Q2Y  2019       Rooming Nurse:     -- Release for last diabetic eye exam note   --  POC, hold for [facility]         Internal Medicine  Acute Care Visit  [facility] at [address], [address] [place] [address]  Phone [phone]; Fax [phone]    Date of Service:  2019  Patient's Name & :   Lissette Lakhani Sr. - 1955   Patient's PCP:  Eugenia Moulton MD     SUBJECTIVE        Chief Complaint   Patient presents with    Back Pain    Flank Pain       Tosha Crow. is a 70 y.o. male complaining of chronic low back pain, usually controlled with tramadol or tylenol. 2 days of increased pain at nighttime and in evenings, not specifically related to movement, occurs in left flank/mid-back, wraps around left side. No GI symptoms. Pain comes and goes. Not having significant pain today. Took tylenol ES this morning. Wants to be sure pain is from known hx of DDD/RA and not representative of something else.       Pain began after no injury  Pain aggravated by lying down  Pain relieved by over the counter medications tylenol ES and prescription medications Tramadol    ROS: back pain and Denies: fever, rigors, rash, break in skin barrier, trauma, numbness, tingling, focal weakness, falls, dysuria, urinary frequency or urgency, blood in urine, constipation    ASSESSMENT & PLAN    The primary encounter diagnosis was Flank pain. Diagnoses of DDD (degenerative disc disease), lumbosacral and Rheumatoid arthritis involving multiple sites, unspecified rheumatoid factor presence ([address] were also pertinent to this visit. Most likely due to DDD, some radicular qualities, though normal exam today  UA today WNL  Gentle stretching  OTC acetaminophen 1000 mg Q8H PRN pain  See orders  Xrays today, follow up with Dr. Earnest Nichols This Encounter    XR SPINE LUMBAR BEND/FLEXION EXTENSION    AMB POC URINALYSIS DIP STICK AUTO W/O MICRO       Pily Bowser MD   04/05/2019 9:10 AM     OBJECTIVE    /86 (BP 1 Location: Left arm, BP Patient Position: Sitting)   Pulse 75   Temp 97.6 °F (36.4 °C) (Oral)   Resp 18   Ht 5' 9\" (1.753 m)   Wt 188 lb 6.4 oz (85.5 kg)   SpO2 98%   BMI 27.82 kg/m²     Physical Exam   Constitutional: He is oriented to person, place, and time. He appears well-developed and well-nourished. No distress. HENT:   Head: Normocephalic and atraumatic. Eyes: Right eye exhibits no discharge. Left eye exhibits no discharge. No scleral icterus. Pulmonary/Chest: Effort normal.   Abdominal: Soft. Normal appearance. There is no tenderness. Musculoskeletal:   Spine/back/flank normal in appearance  No TTP over spinous processes  No palpable abnormalities/deformities of spine or ribs  No focal TTP in area of concern   Neurological: He is alert and oriented to person, place, and time. He exhibits normal muscle tone. Coordination normal.   Skin: Skin is warm and dry. No rash noted. He is not diaphoretic. No erythema. No pallor. Psychiatric: He has a normal mood and affect.  His behavior is normal. Judgment and thought content normal.   Nursing note and vitals reviewed. Additional History   Patient's Past Med Hx, Surg Hx, Soc Hx, Family Hx reviewed. Current Outpatient Medications   Medication Sig    Blood Glucose Control High&Low (ACCU-CHEK FREIDA CONTROL SOLN) soln Use to run controls    Blood-Glucose Meter (ACCU-CHEK FREIDA PLUS METER) misc Use to test blood sugar twice a day.  glucose blood VI test strips (ACCU-CHEK FREIDA PLUS TEST STRP) strip Use to test blood sugar twice a day.  lancets (ACCU-CHEK SOFTCLIX LANCETS) misc Test Blood sugar twice daily    alcohol swabs (BD SINGLE USE SWABS REGULAR) padm Use to test blood sugar twice a day.  metFORMIN (GLUCOPHAGE) 1,000 mg tablet TAKE 1 TABLET TWICE DAILY WITH MEALS    ergocalciferol (ERGOCALCIFEROL) 50,000 unit capsule Take 1 Cap by mouth every seven (7) days.  losartan-hydroCHLOROthiazide (HYZAAR) 100-12.5 mg per tablet Take 1 Tab by mouth daily.  gabapentin (NEURONTIN) 100 mg capsule Take 2 Caps by mouth nightly.  loratadine 10 mg cap Take  by mouth.  fluticasone (FLONASE) 50 mcg/actuation nasal spray 2 Sprays by Both Nostrils route daily.  traMADol (ULTRAM) 50 mg tablet TAKE 1 TABLET EVERY 8 HOURS AS NEEDED FOR PAIN  ( MAXIMUM DAILY AMOUNT:  150MG  )    leflunomide (ARAVA) 20 mg tablet Take 1 Tab by mouth daily.  acetaminophen (TYLENOL) 500 mg tablet 500 mg as needed.  red yeast rice extract 600 mg cap Take 1,800 mg by mouth daily.  aspirin 81 mg tablet Take 1 Tab by mouth daily. No current facility-administered medications for this visit. Allergies   Allergen Reactions    Statins-Hmg-Coa Reductase Inhibitors Myalgia       Encounter Diagnoses:     Encounter Diagnoses     ICD-10-CM ICD-9-CM   1. Flank pain R10.9 789.09   2. DDD (degenerative disc disease), lumbosacral M51.37 722.52   3.  Rheumatoid arthritis involving multiple sites, unspecified rheumatoid factor presence (HCC) M06.9 714.0

## 2019-04-05 NOTE — PATIENT INSTRUCTIONS
Xrays today    If your Xrays show significant changes/worsening, Dr. Wilson Part may recommend MRI to further evaluate your spine    Continue as needed tylenol

## 2019-07-22 RX ORDER — LOSARTAN POTASSIUM AND HYDROCHLOROTHIAZIDE 12.5; 1 MG/1; MG/1
TABLET ORAL
Qty: 90 TAB | Refills: 0 | Status: SHIPPED | OUTPATIENT
Start: 2019-07-22 | End: 2019-09-13 | Stop reason: SDUPTHER

## 2019-07-31 DIAGNOSIS — M06.9 RHEUMATOID ARTHRITIS INVOLVING MULTIPLE SITES, UNSPECIFIED RHEUMATOID FACTOR PRESENCE: ICD-10-CM

## 2019-07-31 DIAGNOSIS — M51.37 DDD (DEGENERATIVE DISC DISEASE), LUMBOSACRAL: Primary | ICD-10-CM

## 2019-07-31 RX ORDER — GABAPENTIN 100 MG/1
CAPSULE ORAL
Qty: 180 CAP | Refills: 1 | Status: SHIPPED | OUTPATIENT
Start: 2019-07-31 | End: 2019-12-23

## 2019-07-31 NOTE — TELEPHONE ENCOUNTER
Last seen 4/5/19    Last filled 1/22/19 qty 180 w/ 1 refill    Needs to be printed and hand faxed to [facility] [phone]

## 2019-09-12 NOTE — PROGRESS NOTES
[employer]  Primary Care Office Visit - Problem-Oriented    : 1955   Katelyn Rodrigues is a 75 y.o. male presenting for  Chief Complaint   Patient presents with    Cholesterol Problem    Hypertension    Diabetes            Assessment/Plan:     1. Diabetes mellitus type 2, noninsulin dependent ([facility]  Unclear control. Check labs. - HEMOGLOBIN A1C WITH EAG; Future  - TSH 3RD GENERATION; Future  - T4, FREE; Future  - metFORMIN (GLUCOPHAGE) 1,000 mg tablet; TAKE 1 TABLET TWICE DAILY WITH MEALS  Dispense: 180 Tab; Refill: 1    2. Rheumatoid arthritis involving multiple sites, unspecified rheumatoid factor presence ([facility]  Ongoing, followed by rheum. Appreciate their input. Will send copy of results. - METABOLIC PANEL, COMPREHENSIVE; Future  - SED RATE (ESR); Future  - CBC WITH AUTOMATED DIFF; Future    3. Essential hypertension  Ongoing, well controlled. No change to current regimen.  - losartan-hydroCHLOROthiazide (HYZAAR) 100-12.5 mg per tablet; TAKE 1 TABLET EVERY DAY  Dispense: 90 Tab; Refill: 1  Key CAD CHF Meds             losartan-hydroCHLOROthiazide (HYZAAR) 100-12.5 mg per tablet (Taking) TAKE 1 TABLET EVERY DAY    aspirin 81 mg tablet (Taking) Take 1 Tab by mouth daily. 4. Vitamin D deficiency  Unclear control. Check labs. Likely will need qweek indefinitely. - VITAMIN D, 25 HYDROXY; Future  - ergocalciferol (ERGOCALCIFEROL) 50,000 unit capsule; Take 1 Cap by mouth every seven (7) days. Dispense: 24 Cap; Refill: 3    5. Hyperlipidemia with target LDL less than 100  Unclear control check labs. '  Unfortunately intolerant of statin medications.  - LIPID PANEL; Future  Key Antihyperlipidemia Meds     The patient is on no antihyperlipidemia meds. 6. Encounter for immunization  - INFLUENZA VACCINE INACTIVATED (IIV), SUBUNIT, ADJUVANTED, IM  - ADMIN INFLUENZA VIRUS VAC      Orders & Diagnoses associated with This Encounter:         ICD-10-CM ICD-9-CM   1. Diabetes mellitus type 2, noninsulin dependent (HCC) E11.9 250.00   2. Rheumatoid arthritis involving multiple sites, unspecified rheumatoid factor presence (HCC) M06.9 714.0   3. Essential hypertension I10 401.9   4. Vitamin D deficiency E55.9 268.9   5. Hyperlipidemia with target LDL less than 100 E78.5 272.4   6. Encounter for immunization Z23 V03.89       Orders Placed This Encounter    Influenza Vaccine Inactivated (IIV)(FLUAD), Subunit, Adjuvanted, IM, (16063)    VITAMIN D, 25 HYDROXY     Standing Status:   Future     Number of Occurrences:   1     Standing Expiration Date:   9/13/2020    HEMOGLOBIN A1C WITH EAG     Standing Status:   Future     Number of Occurrences:   1     Standing Expiration Date:   9/13/2020    LIPID PANEL     Standing Status:   Future     Number of Occurrences:   1     Standing Expiration Date:   3/37/9695    METABOLIC PANEL, COMPREHENSIVE     Standing Status:   Future     Number of Occurrences:   1     Standing Expiration Date:   9/13/2020    SED RATE (ESR)     Standing Status:   Future     Number of Occurrences:   1     Standing Expiration Date:   9/13/2020    TSH 3RD GENERATION     Standing Status:   Future     Number of Occurrences:   1     Standing Expiration Date:   9/13/2020    T4, FREE     Standing Status:   Future     Number of Occurrences:   1     Standing Expiration Date:   9/13/2020    CBC WITH AUTOMATED DIFF     Standing Status:   Future     Number of Occurrences:   1     Standing Expiration Date:   9/13/2020    Administration fee () for Medicare insured patients    metFORMIN (GLUCOPHAGE) 1,000 mg tablet     Sig: TAKE 1 TABLET TWICE DAILY WITH MEALS     Dispense:  180 Tab     Refill:  1    losartan-hydroCHLOROthiazide (HYZAAR) 100-12.5 mg per tablet     Sig: TAKE 1 TABLET EVERY DAY     Dispense:  90 Tab     Refill:  1    ergocalciferol (ERGOCALCIFEROL) 50,000 unit capsule     Sig: Take 1 Cap by mouth every seven (7) days.      Dispense:  24 Cap     Refill: 3         This document may have been created with the aid of dictation software. Text may contain errors, particularly phonetic errors. Reviewed management plan & instructions with patient, who voiced understanding. Fidencio Lopes MD  Internal Medicine, Family Medicine & Sports Medicine  9/13/2019    Subjective   History:   Kala Ganser. is a 75 y.o. male presenting to address:  Chief Complaint   Patient presents with    Cholesterol Problem    Hypertension    Diabetes       Overall doing okay. Ongoing chronic joint pain. Still is hesitant re: DMARDs for his autoimmune arthropathy, mainly 2/2 cost.  Hasn't been to the gym in a while. Compliant with meds. Denies any AE. Past Medical History:   Diagnosis Date    Diabetes ([address]     H. pylori infection     s/p prevpack    H/O esophagogastroduodenoscopy 3/1/2013    irregular z-line, small 1cm submucosal nodule    Hypercholesterolemia     Hypertension     Rheumatoid arthritis(714.0)      Past Surgical History:   Procedure Laterality Date    ENDOSCOPY, COLON, DIAGNOSTIC      HX HERNIA REPAIR Right 1998    HX WISDOM TEETH EXTRACTION        reports that he has quit smoking. He has never used smokeless tobacco. He reports that he does not drink alcohol or use drugs.   Social History     Social History Narrative    Not on file     Social History     Tobacco Use   Smoking Status Former Smoker   Smokeless Tobacco Never Used     Family History   Problem Relation Age of Onset    Heart Disease Mother     Heart Disease Father    Jorje Notice Arthritis-rheumatoid Sister     Hypertension Sister     Heart Disease Sister     Heart Disease Brother      Allergies   Allergen Reactions    Statins-Hmg-Coa Reductase Inhibitors Myalgia       Problem List:      Patient Active Problem List    Diagnosis    Essential hypertension     - meds: losartan/HCTZ 50/12.5        Diabetes mellitus type 2, noninsulin dependent ([address]     - meds: metformin 1000mg BID    - A1c 7.5 (Nov 2015)  - A1c 6.5 (4/14/2015)    - foot exam: 7/8/2014    Intolerant of statins.  Rheumatoid arthritis ([facility]     -1/16/2017 [Dr. Christa Briseno: seropositive long-standing disease on arava monotherapy, reporting RAPID3 16.3; sicca syndrome; r/o Sjogrens, labs, possibly US to eval disease activity    -4/26/2016: [Rheum] refilled leflunomide 20 mg daily, noted that he would be switching to a rheumatologist closer to home  -2/17/2016: continue on leflunomide, plaquenil, labs: cbc, ESR, CMP, CRP, B hand xrays;  follow up 2 mo    Previously followed by Dr. Sariah Fiore (rheum)    -- 9/29/2014 [rheum]: cont humira, plaquenil & tramadol; stop prednisone; flu shot given  -- 7/29/2014 [rheum]: cont humira & plaquenil; reduce prednisone to 2.5mg daily  -- 4/29/2014 [rheum]: combo of RA & DJD; continue humira, plaquenil & prednisone 5mg daily; added tramadol qhs prn; avoid NSAIDs; recs: check neck US to rule out lymph node swelling (R side, above clavicle)  -- Mar 2013: RF = 588      Hyperlipidemia with target LDL less than 100     - statin intolerant    -- tot 258 (H), HDL 45, , LDLc 186 (H) (1/10/2015); 10yr ASCVD risk 22.5%  -- tot 146, , HDL 51, LDLc 72 (7/8/2014)  -- Sept 2013: tot 139, LDL 76, HDL 42,       Chronic gastritis    Esophagitis     Chronic esophgitis by bx with irregular z-line (Mar 2013) by Dr. Janak Lopez ([facility]).  Type 2 diabetes mellitus with diabetic neuropathy ([facility]    DDD (degenerative disc disease), lumbosacral     L-spine XR (Dec 2016): Mild L4-S1 degenerative endplate reactive changes and disc space height loss. Moderate bilateral facet arthropathy L4-S1. Bilateral oblique views demonstrate moderate right and mild left L5-S1 foraminal stenoses.  Statin intolerance    Right thyroid nodule     Followed by Dr. Willa Butcher (endo).     -- 6/10/2014 [endo]: FNA Bx negative for malignancy      Vitamin D deficiency     - VitD 34.4 (7/8/2014)    *5/7/2014: VitD 14.6 (L)  - start 50k 2x/wk      Elevated TSH    Decreased libido     -- 3/4/2014: slightly low free testosterone, normal PSA, but slightly elevated TSH      TERRY (obstructive sleep apnea)     Followed by Dr. Inocencia [facility])  -- 12/17/2013: overnight sleep study pending        Myalgia     *10/9/2014: improved off of januvia  *5/7/2014: sx unchanged off crestor    -- 3/4/2014: continue to hold crestor; advised massage & stretching  --  (2/21/2014); holding crestor until next appt  -- aldolase wnl (2/21/2014)      Routine health maintenance     -- c-scope: 4/6/2012 by Jillian Suarez ([address]), hyperplastic polyps; next due in 10 yrs (Apr 2022)      Neural foraminal stenosis of cervical spine     C-spine MRI (Apr 2013):  - focal R paracentral disc protrusion @ C3-4 with mild cord impingement  - mild to mod foraminal stenoses, primarily @ C3-4 and C4-5 on right  - no C1-2 rheumatoid instability      Hemoglobin C trait ([address]     Seen on Hgb electrophoresis on 5/29/2013. Medications:     Current Outpatient Medications   Medication Sig    metFORMIN (GLUCOPHAGE) 1,000 mg tablet TAKE 1 TABLET TWICE DAILY WITH MEALS    losartan-hydroCHLOROthiazide (HYZAAR) 100-12.5 mg per tablet TAKE 1 TABLET EVERY DAY    ergocalciferol (ERGOCALCIFEROL) 50,000 unit capsule Take 1 Cap by mouth every seven (7) days.  gabapentin (NEURONTIN) 100 mg capsule TAKE 2 CAPSULES EVERY NIGHT    Blood Glucose Control High&Low (ACCU-CHEK FREIDA CONTROL SOLN) soln Use to run controls    Blood-Glucose Meter (ACCU-CHEK FREIDA PLUS METER) misc Use to test blood sugar twice a day.  glucose blood VI test strips (ACCU-CHEK FREIDA PLUS TEST STRP) strip Use to test blood sugar twice a day.  lancets (ACCU-CHEK SOFTCLIX LANCETS) misc Test Blood sugar twice daily    alcohol swabs (BD SINGLE USE SWABS REGULAR) padm Use to test blood sugar twice a day.     loratadine 10 mg cap Take 10 mg by mouth daily as needed.  fluticasone (FLONASE) 50 mcg/actuation nasal spray 2 Sprays by Both Nostrils route daily. (Patient taking differently: 2 Sprays by Both Nostrils route daily as needed.)    traMADol (ULTRAM) 50 mg tablet TAKE 1 TABLET EVERY 8 HOURS AS NEEDED FOR PAIN  ( MAXIMUM DAILY AMOUNT:  150MG  )    leflunomide (ARAVA) 20 mg tablet Take 1 Tab by mouth daily.  acetaminophen (TYLENOL) 500 mg tablet 500 mg as needed.  red yeast rice extract 600 mg cap Take 1,800 mg by mouth two (2) times a day.  aspirin 81 mg tablet Take 1 Tab by mouth daily. No current facility-administered medications for this visit. Review of Systems:     Review of Systems   Constitutional: Negative for chills and fever. HENT: Negative for ear pain and sore throat. Respiratory: Negative for cough and shortness of breath. Cardiovascular: Negative for chest pain and palpitations. Gastrointestinal: Negative for abdominal pain and heartburn. Genitourinary: Negative for dysuria. Musculoskeletal: Positive for joint pain (chronic, stable). Negative for myalgias. Skin: Negative for rash. Neurological: Negative for speech change, focal weakness and headaches. Endo/Heme/Allergies: Does not bruise/bleed easily. Psychiatric/Behavioral: Negative for depression. The patient is not nervous/anxious and does not have insomnia. Objective   Physical Assessment:   VS:    Vitals:    09/13/19 0903   BP: 127/86   Pulse: 76   Resp: 16   Temp: 95.8 °F (35.4 °C)   TempSrc: Oral   SpO2: 97%   Weight: 188 lb 12.8 oz (85.6 kg)   Height: 5' 9\" (1.753 m)   PainSc:   5   PainLoc: Generalized       Physical Exam   Constitutional: He is oriented to person, place, and time. He appears well-developed and well-nourished. No distress. HENT:   Head: Normocephalic and atraumatic.    Right Ear: External ear normal.   Left Ear: External ear normal.   Mouth/Throat: Oropharynx is clear and moist. Eyes: Pupils are equal, round, and reactive to light. EOM are normal.   Neck: Normal range of motion. Neck supple. Cardiovascular: Normal rate, regular rhythm and normal heart sounds. No murmur heard. Pulmonary/Chest: Effort normal and breath sounds normal. No respiratory distress. He has no wheezes. Abdominal: Soft. Bowel sounds are normal. There is no tenderness. There is no rigidity, no rebound and no CVA tenderness. Musculoskeletal: Normal range of motion. Neurological: He is alert and oriented to person, place, and time. No cranial nerve deficit. Skin: Skin is warm and dry. He is not diaphoretic. Psychiatric: He has a normal mood and affect. His behavior is normal. Judgment and thought content normal.   Nursing note reviewed.

## 2019-09-12 NOTE — PATIENT INSTRUCTIONS
To Do: - get your bloodwork done on your way out      Notes from your doctor:  - your homework is to go to the gym twice a week for 2 weeks straight. You don't have to do a whole workout. .. Maybe say \"I'll stay for 10 minutes, and if I don't like it or I don't feel good, then I can go home. \"

## 2019-09-13 ENCOUNTER — OFFICE VISIT (OUTPATIENT)
Dept: FAMILY MEDICINE CLINIC | Age: 64
End: 2019-09-13

## 2019-09-13 VITALS
OXYGEN SATURATION: 97 % | SYSTOLIC BLOOD PRESSURE: 127 MMHG | HEIGHT: 69 IN | WEIGHT: 188.8 LBS | DIASTOLIC BLOOD PRESSURE: 86 MMHG | RESPIRATION RATE: 16 BRPM | BODY MASS INDEX: 27.96 KG/M2 | HEART RATE: 76 BPM | TEMPERATURE: 95.8 F

## 2019-09-13 DIAGNOSIS — E78.5 HYPERLIPIDEMIA WITH TARGET LDL LESS THAN 100: ICD-10-CM

## 2019-09-13 DIAGNOSIS — M06.9 RHEUMATOID ARTHRITIS INVOLVING MULTIPLE SITES, UNSPECIFIED RHEUMATOID FACTOR PRESENCE: ICD-10-CM

## 2019-09-13 DIAGNOSIS — E11.9 DIABETES MELLITUS TYPE 2, NONINSULIN DEPENDENT (HCC): Primary | Chronic | ICD-10-CM

## 2019-09-13 DIAGNOSIS — E11.9 DIABETES MELLITUS TYPE 2, NONINSULIN DEPENDENT (HCC): Chronic | ICD-10-CM

## 2019-09-13 DIAGNOSIS — I10 ESSENTIAL HYPERTENSION: ICD-10-CM

## 2019-09-13 DIAGNOSIS — E55.9 VITAMIN D DEFICIENCY: ICD-10-CM

## 2019-09-13 DIAGNOSIS — Z23 ENCOUNTER FOR IMMUNIZATION: ICD-10-CM

## 2019-09-13 RX ORDER — LOSARTAN POTASSIUM AND HYDROCHLOROTHIAZIDE 12.5; 1 MG/1; MG/1
TABLET ORAL
Qty: 90 TAB | Refills: 1 | Status: SHIPPED | OUTPATIENT
Start: 2019-09-13 | End: 2020-04-01

## 2019-09-13 RX ORDER — METFORMIN HYDROCHLORIDE 1000 MG/1
TABLET ORAL
Qty: 180 TAB | Refills: 1 | Status: SHIPPED | OUTPATIENT
Start: 2019-09-13 | End: 2020-04-01

## 2019-09-13 RX ORDER — ERGOCALCIFEROL 1.25 MG/1
50000 CAPSULE ORAL
Qty: 24 CAP | Refills: 3 | Status: SHIPPED | OUTPATIENT
Start: 2019-09-13 | End: 2020-10-07

## 2019-09-13 NOTE — PROGRESS NOTES
Zeke Macario is a 75 y.o. male (: 1955) presenting to address:    Chief Complaint   Patient presents with    Cholesterol Problem    Hypertension    Diabetes       Vitals:    19 0903   BP: 127/86   Pulse: 76   Resp: 16   Temp: 95.8 °F (35.4 °C)   TempSrc: Oral   SpO2: 97%   Weight: 188 lb 12.8 oz (85.6 kg)   Height: 5' 9\" (1.753 m)   PainSc:   5   PainLoc: Generalized       Hearing/Vision:   No exam data present    Learning Assessment:     Learning Assessment 2014   PRIMARY LEARNER Patient   HIGHEST LEVEL OF EDUCATION - PRIMARY LEARNER  SOME COLLEGE   BARRIERS PRIMARY LEARNER NONE   CO-LEARNER CAREGIVER No   PRIMARY LANGUAGE ENGLISH   LEARNER PREFERENCE PRIMARY OTHER (COMMENT)   ANSWERED BY Patient   RELATIONSHIP SELF     Depression Screening:     3 most recent PHQ Screens 2019   Little interest or pleasure in doing things Not at all   Feeling down, depressed, irritable, or hopeless Not at all   Total Score PHQ 2 0   Trouble falling or staying asleep, or sleeping too much -   Feeling tired or having little energy -   Poor appetite, weight loss, or overeating -   Feeling bad about yourself - or that you are a failure or have let yourself or your family down -   Trouble concentrating on things such as school, work, reading, or watching TV -   Moving or speaking so slowly that other people could have noticed; or the opposite being so fidgety that others notice -   Thoughts of being better off dead, or hurting yourself in some way -   How difficult have these problems made it for you to do your work, take care of your home and get along with others -     Fall Risk Assessment:     Fall Risk Assessment, last 12 mths 2019   Able to walk? Yes   Fall in past 12 months? No   Fall with injury? -   Number of falls in past 12 months -   Fall Risk Score -     Abuse Screening:     Abuse Screening Questionnaire 3/1/2019   Do you ever feel afraid of your partner?  N   Are you in a relationship with someone who physically or mentally threatens you? N   Is it safe for you to go home? Y     Coordination of Care Questionaire:   1. Have you been to the ER, urgent care clinic since your last visit? Hospitalized since your last visit? NO    2. Have you seen or consulted any other health care providers outside of the [address] [place] [address] since your last visit? Include any pap smears or colon screening. YES [facility] (signed release to get records)    Advanced Directive:   1. Do you have an Advanced Directive? NO    2. Would you like information on Advanced Directives? NO      Flu AD Immunization/s administered 9/13/2019 by Tala Cai LPN with patients  consent. Patient tolerated procedure well. No reactions noted.

## 2019-09-17 LAB
25(OH)D3+25(OH)D2 SERPL-MCNC: 28.7 NG/ML (ref 30–100)
ALBUMIN SERPL-MCNC: 4.3 G/DL (ref 3.6–4.8)
ALBUMIN/GLOB SERPL: 1.4 {RATIO} (ref 1.2–2.2)
ALP SERPL-CCNC: 96 IU/L (ref 39–117)
ALT SERPL-CCNC: 24 IU/L (ref 0–44)
AST SERPL-CCNC: 19 IU/L (ref 0–40)
BASOPHILS # BLD AUTO: 0.1 X10E3/UL (ref 0–0.2)
BASOPHILS NFR BLD AUTO: 1 %
BILIRUB SERPL-MCNC: 0.5 MG/DL (ref 0–1.2)
BUN SERPL-MCNC: 9 MG/DL (ref 8–27)
BUN/CREAT SERPL: 12 (ref 10–24)
CALCIUM SERPL-MCNC: 9.6 MG/DL (ref 8.6–10.2)
CHLORIDE SERPL-SCNC: 97 MMOL/L (ref 96–106)
CHOLEST SERPL-MCNC: 289 MG/DL (ref 100–199)
CO2 SERPL-SCNC: 26 MMOL/L (ref 20–29)
CREAT SERPL-MCNC: 0.78 MG/DL (ref 0.76–1.27)
EOSINOPHIL # BLD AUTO: 0.3 X10E3/UL (ref 0–0.4)
EOSINOPHIL NFR BLD AUTO: 7 %
ERYTHROCYTE [DISTWIDTH] IN BLOOD BY AUTOMATED COUNT: 15.7 % (ref 12.3–15.4)
ERYTHROCYTE [SEDIMENTATION RATE] IN BLOOD BY WESTERGREN METHOD: 42 MM/HR (ref 0–30)
EST. AVERAGE GLUCOSE BLD GHB EST-MCNC: 128 MG/DL
GLOBULIN SER CALC-MCNC: 3.1 G/DL (ref 1.5–4.5)
GLUCOSE SERPL-MCNC: 100 MG/DL (ref 65–99)
HBA1C MFR BLD: 6.1 % (ref 4.8–5.6)
HCT VFR BLD AUTO: 40.9 % (ref 37.5–51)
HDLC SERPL-MCNC: 43 MG/DL
HGB BLD-MCNC: 13.6 G/DL (ref 13–17.7)
IMM GRANULOCYTES # BLD AUTO: 0 X10E3/UL (ref 0–0.1)
IMM GRANULOCYTES NFR BLD AUTO: 0 %
INTERPRETATION, 910389: NORMAL
LDLC SERPL CALC-MCNC: 223 MG/DL (ref 0–99)
LYMPHOCYTES # BLD AUTO: 1.6 X10E3/UL (ref 0.7–3.1)
LYMPHOCYTES NFR BLD AUTO: 36 %
Lab: NORMAL
MCH RBC QN AUTO: 24.5 PG (ref 26.6–33)
MCHC RBC AUTO-ENTMCNC: 33.3 G/DL (ref 31.5–35.7)
MCV RBC AUTO: 74 FL (ref 79–97)
MONOCYTES # BLD AUTO: 0.5 X10E3/UL (ref 0.1–0.9)
MONOCYTES NFR BLD AUTO: 11 %
NEUTROPHILS # BLD AUTO: 2.1 X10E3/UL (ref 1.4–7)
NEUTROPHILS NFR BLD AUTO: 45 %
PLATELET # BLD AUTO: 264 X10E3/UL (ref 150–450)
POTASSIUM SERPL-SCNC: 4 MMOL/L (ref 3.5–5.2)
PROT SERPL-MCNC: 7.4 G/DL (ref 6–8.5)
RBC # BLD AUTO: 5.55 X10E6/UL (ref 4.14–5.8)
SODIUM SERPL-SCNC: 139 MMOL/L (ref 134–144)
T4 FREE SERPL-MCNC: 1.06 NG/DL (ref 0.82–1.77)
TRIGL SERPL-MCNC: 117 MG/DL (ref 0–149)
TSH SERPL DL<=0.005 MIU/L-ACNC: 2.75 UIU/ML (ref 0.45–4.5)
VLDLC SERPL CALC-MCNC: 23 MG/DL (ref 5–40)
WBC # BLD AUTO: 4.6 X10E3/UL (ref 3.4–10.8)

## 2019-12-20 ENCOUNTER — OFFICE VISIT (OUTPATIENT)
Dept: FAMILY MEDICINE CLINIC | Age: 64
End: 2019-12-20

## 2019-12-20 VITALS
BODY MASS INDEX: 27.88 KG/M2 | HEIGHT: 69 IN | TEMPERATURE: 96.1 F | SYSTOLIC BLOOD PRESSURE: 137 MMHG | OXYGEN SATURATION: 99 % | DIASTOLIC BLOOD PRESSURE: 83 MMHG | RESPIRATION RATE: 17 BRPM | HEART RATE: 76 BPM | WEIGHT: 188.2 LBS

## 2019-12-20 DIAGNOSIS — E11.40 TYPE 2 DIABETES MELLITUS WITH DIABETIC NEUROPATHY, WITHOUT LONG-TERM CURRENT USE OF INSULIN (HCC): ICD-10-CM

## 2019-12-20 DIAGNOSIS — I10 ESSENTIAL HYPERTENSION: ICD-10-CM

## 2019-12-20 DIAGNOSIS — E11.9 DIABETES MELLITUS TYPE 2, NONINSULIN DEPENDENT (HCC): Primary | Chronic | ICD-10-CM

## 2019-12-20 DIAGNOSIS — Z78.9 STATIN INTOLERANCE: ICD-10-CM

## 2019-12-20 DIAGNOSIS — E55.9 VITAMIN D DEFICIENCY: ICD-10-CM

## 2019-12-20 DIAGNOSIS — E78.5 HYPERLIPIDEMIA WITH TARGET LDL LESS THAN 100: ICD-10-CM

## 2019-12-20 LAB
ALBUMIN UR QL STRIP: 30 MG/L
CREATININE, URINE POC: 300 MG/DL
MICROALBUMIN/CREAT RATIO POC: <30 MG/G

## 2019-12-20 NOTE — PROGRESS NOTES
[facility]  Primary Care Office Visit - Problem-Oriented    : 1955   Davey Rosen is a 75 y.o. male presenting for  Chief Complaint   Patient presents with    Diabetes     Diabetes mellitus type 2, noninsulin dependent ([address]  f/u            Assessment/Plan:     1. Diabetes mellitus type 2, noninsulin dependent ([address]  2. Type 2 diabetes mellitus with diabetic neuropathy, without long-term current use of insulin ([address]  Well controlled, last A1c 6.1 in 2019. No change to current regimen. Takes 200mg gabapentin QHS. -  DIABETES FOOT EXAM  - AMB POC URINE, MICROALBUMIN, SEMIQUANT (3 RESULTS)  Key Antihyperglycemic Medications             metFORMIN (GLUCOPHAGE) 1,000 mg tablet (Taking) TAKE 1 TABLET TWICE DAILY WITH MEALS          3. Essential hypertension  Well controlled. Requesting copy of labs from [address] [place] [address] CHF Meds             losartan-hydroCHLOROthiazide (HYZAAR) 100-12.5 mg per tablet (Taking) TAKE 1 TABLET EVERY DAY    aspirin 81 mg tablet (Taking) Take 1 Tab by mouth daily. 4. Hyperlipidemia with target LDL less than 100  with  5. Statin intolerance  Currently on red yeast rice extract. Was previously on crestor 20mg. Future considerations: trial of very low dose statin every other day? Lab Results   Component Value Date/Time    Cholesterol, total 289 (H) 2019 09:14 AM    HDL Cholesterol 43 2019 09:14 AM    LDL, calculated 223 (H) 2019 09:14 AM    VLDL, calculated 23 2019 09:14 AM    Triglyceride 117 2019 09:14 AM         6. Vitamin D deficiency  Unclear control. Requesting labs from [facility] weekly. Lab Results   Component Value Date/Time    Vitamin D 25-Hydroxy 31.0 2015 11:32 AM    VITAMIN D, 25-HYDROXY 28.7 (L) 2019 09:14 AM             Orders & Diagnoses associated with This Encounter:         ICD-10-CM ICD-9-CM   1.  Diabetes mellitus type 2, noninsulin dependent ([address] E11.9 250. 00   2. Type 2 diabetes mellitus with diabetic neuropathy, without long-term current use of insulin (HCC) E11.40 250.60     357.2   3. Essential hypertension I10 401.9   4. Hyperlipidemia with target LDL less than 100 E78.5 272.4   5. Statin intolerance Z78.9 995.27   6. Vitamin D deficiency E55.9 268.9       Orders Placed This Encounter    AMB POC URINE, MICROALBUMIN, SEMIQUANT (3 RESULTS)    HM DIABETES FOOT EXAM         This document may have been created with the aid of dictation software. Text may contain errors, particularly phonetic errors. Reviewed management plan & instructions with patient, who voiced understanding. Capo Vidal MD  Internal Medicine, Family Medicine & Sports Medicine  12/20/2019    Subjective   History:   Gisselle Lynch is a 75 y.o. male presenting to address:  Chief Complaint   Patient presents with    Diabetes     Diabetes mellitus type 2, noninsulin dependent ([facility]  f/u       Overall doing okay. Compliant with medications. Denies any AE. Notes that \"rheumatology still wants to put me on these expensive medications for my joints. \"  Isn't exercising as much as he'd like, due to scheduling. States he recently got bloodwork done from [facility] for rheumatology. Past Medical History:   Diagnosis Date    Diabetes ([facility]     H. pylori infection     s/p prevpack    H/O esophagogastroduodenoscopy 3/1/2013    irregular z-line, small 1cm submucosal nodule    Hypercholesterolemia     Hypertension     Rheumatoid arthritis(714.0)      Past Surgical History:   Procedure Laterality Date    ENDOSCOPY, COLON, DIAGNOSTIC      HX HERNIA REPAIR Right 1998    HX WISDOM TEETH EXTRACTION        reports that he has quit smoking. He has never used smokeless tobacco. He reports that he does not drink alcohol or use drugs. Social History     Patient does not qualify to have social determinant information on file (likely too young).    Social History Narrative    Not on file     Social History     Tobacco Use   Smoking Status Former Smoker   Smokeless Tobacco Never Used     Family History   Problem Relation Age of Onset    Heart Disease Mother     Heart Disease Father    [facility] Arthritis-rheumatoid Sister     Hypertension Sister     Heart Disease Sister     Heart Disease Brother      Allergies   Allergen Reactions    Statins-Hmg-Coa Reductase Inhibitors Myalgia       Problem List:      Patient Active Problem List    Diagnosis    Essential hypertension     - meds: losartan/HCTZ 50/12.5        Diabetes mellitus type 2, noninsulin dependent ([facility]     - meds: metformin 1000mg BID    - A1c 7.5 (Nov 2015)  - A1c 6.5 (4/14/2015)    - foot exam: 7/8/2014    Intolerant of statins.           Rheumatoid arthritis ([facility]     -1/16/2017 [Dr. Kate Felix: seropositive long-standing disease on arava monotherapy, reporting RAPID3 16.3; sicca syndrome; r/o Sjogrens, labs, possibly US to eval disease activity    -4/26/2016: [Rheum] refilled leflunomide 20 mg daily, noted that he would be switching to a rheumatologist closer to home  -2/17/2016: continue on leflunomide, plaquenil, labs: cbc, ESR, CMP, CRP, B hand xrays;  follow up 2 mo    Previously followed by Dr. Leo Amin (rheum)    -- 9/29/2014 [rheum]: cont humira, plaquenil & tramadol; stop prednisone; flu shot given  -- 7/29/2014 [rheum]: cont humira & plaquenil; reduce prednisone to 2.5mg daily  -- 4/29/2014 [rheum]: combo of RA & DJD; continue humira, plaquenil & prednisone 5mg daily; added tramadol qhs prn; avoid NSAIDs; recs: check neck US to rule out lymph node swelling (R side, above clavicle)  -- Mar 2013: RF = 588      Hyperlipidemia with target LDL less than 100     - statin intolerant    -- tot 258 (H), HDL 45, , LDLc 186 (H) (1/10/2015); 10yr ASCVD risk 22.5%  -- tot 146, , HDL 51, LDLc 72 (7/8/2014)  -- Sept 2013: tot 139, LDL 76, HDL 42,       Chronic gastritis    Esophagitis     Chronic esophgitis by bx with irregular z-line (Mar 2013) by Dr. Blu Wang (DL[facility]).  Type 2 diabetes mellitus with diabetic neuropathy ([facility]    DDD (degenerative disc disease), lumbosacral     L-spine XR (Dec 2016): Mild L4-S1 degenerative endplate reactive changes and disc space height loss. Moderate bilateral facet arthropathy L4-S1. Bilateral oblique views demonstrate moderate right and mild left L5-S1 foraminal stenoses.  Statin intolerance    Right thyroid nodule     Followed by Dr. Vanesa Patrick (endo). -- 6/10/2014 [endo]: FNA Bx negative for malignancy      Vitamin D deficiency     - VitD 34.4 (7/8/2014)    *5/7/2014: VitD 14.6 (L)  - start 50k 2x/wk      Elevated TSH    Decreased libido     -- 3/4/2014: slightly low free testosterone, normal PSA, but slightly elevated TSH      TERRY (obstructive sleep apnea)     Followed by Dr. Shanelle MACE [facility])  -- 12/17/2013: overnight sleep study pending        Myalgia     *10/9/2014: improved off of januvia  *5/7/2014: sx unchanged off crestor    -- 3/4/2014: continue to hold crestor; advised massage & stretching  --  (2/21/2014); holding crestor until next appt  -- aldolase wnl (2/21/2014)      Routine health maintenance     -- c-scope: 4/6/2012 by Daniela Gandara ([address]), hyperplastic polyps; next due in 10 yrs (Apr 2022)      Neural foraminal stenosis of cervical spine     C-spine MRI (Apr 2013):  - focal R paracentral disc protrusion @ C3-4 with mild cord impingement  - mild to mod foraminal stenoses, primarily @ C3-4 and C4-5 on right  - no C1-2 rheumatoid instability      Hemoglobin C trait ([facility]     Seen on Hgb electrophoresis on 5/29/2013.          Medications:     Current Outpatient Medications   Medication Sig    metFORMIN (GLUCOPHAGE) 1,000 mg tablet TAKE 1 TABLET TWICE DAILY WITH MEALS    losartan-hydroCHLOROthiazide (HYZAAR) 100-12.5 mg per tablet TAKE 1 TABLET EVERY DAY    ergocalciferol (ERGOCALCIFEROL) 50,000 unit capsule Take 1 Cap by mouth every seven (7) days.  Blood Glucose Control High&Low (ACCU-CHEK FREIDA CONTROL SOLN) soln Use to run controls    Blood-Glucose Meter (ACCU-CHEK FREIDA PLUS METER) [facility] Use to test blood sugar twice a day.  glucose blood VI test strips (ACCU-CHEK FREIDA PLUS TEST STRP) strip Use to test blood sugar twice a day.  lancets (ACCU-CHEK SOFTCLIX LANCETS) misc Test Blood sugar twice daily    alcohol swabs (BD SINGLE USE SWABS REGULAR) padm Use to test blood sugar twice a day.  loratadine 10 mg cap Take 10 mg by mouth daily as needed.  fluticasone (FLONASE) 50 mcg/actuation nasal spray 2 Sprays by Both Nostrils route daily. (Patient taking differently: 2 Sprays by Both Nostrils route daily as needed.)    traMADol (ULTRAM) 50 mg tablet TAKE 1 TABLET EVERY 8 HOURS AS NEEDED FOR PAIN  ( MAXIMUM DAILY AMOUNT:  150MG  )    leflunomide (ARAVA) 20 mg tablet Take 1 Tab by mouth daily.  red yeast rice extract 600 mg cap Take 1,800 mg by mouth two (2) times a day.  aspirin 81 mg tablet Take 1 Tab by mouth daily.  gabapentin (NEURONTIN) 100 mg capsule TAKE 2 CAPSULES EVERY NIGHT    acetaminophen (TYLENOL) 500 mg tablet 500 mg as needed. No current facility-administered medications for this visit. Review of Systems:     Review of Systems   Constitutional: Negative for chills and fever. HENT: Negative for ear pain and sore throat. Respiratory: Negative for cough and shortness of breath. Cardiovascular: Negative for chest pain and palpitations. Gastrointestinal: Negative for abdominal pain and heartburn. Genitourinary: Negative for dysuria. Musculoskeletal: Positive for joint pain (chronic, stable). Negative for myalgias. Skin: Negative for rash. Neurological: Negative for speech change, focal weakness and headaches. Endo/Heme/Allergies: Does not bruise/bleed easily. Psychiatric/Behavioral: Negative for depression.  The patient is not nervous/anxious and does not have insomnia. Objective   Physical Assessment:   VS:    Vitals:    12/20/19 0840   BP: 137/83   Pulse: 76   Resp: 17   Temp: 96.1 °F (35.6 °C)   TempSrc: Oral   SpO2: 99%   Weight: 188 lb 3.2 oz (85.4 kg)   Height: 5' 9\" (1.753 m)   PainSc:   0 - No pain     Physical Exam  Nursing note reviewed. Constitutional:       General: He is not in acute distress. Appearance: He is well-developed. He is not diaphoretic. HENT:      Head: Normocephalic and atraumatic. Right Ear: External ear normal.      Left Ear: External ear normal.   Eyes:      Pupils: Pupils are equal, round, and reactive to light. Neck:      Musculoskeletal: Normal range of motion and neck supple. Cardiovascular:      Rate and Rhythm: Normal rate and regular rhythm. Pulses:           Dorsalis pedis pulses are 1+ on the right side and 1+ on the left side. Posterior tibial pulses are 1+ on the right side and 1+ on the left side. Heart sounds: Normal heart sounds. No murmur. Pulmonary:      Effort: Pulmonary effort is normal. No respiratory distress. Breath sounds: Normal breath sounds. No wheezing. Musculoskeletal: Normal range of motion. Right foot: Normal range of motion. Left foot: Normal range of motion. Feet:      Right foot:      Protective Sensation: 6 sites tested. 6 sites sensed. Skin integrity: Dry skin present. Toenail Condition: Right toenails are normal.      Left foot:      Protective Sensation: 6 sites tested. 6 sites sensed. Skin integrity: Dry skin present. Toenail Condition: Left toenails are normal.   Skin:     General: Skin is warm and dry. Neurological:      Mental Status: He is alert and oriented to person, place, and time. Cranial Nerves: No cranial nerve deficit. Psychiatric:         Behavior: Behavior normal.         Thought Content:  Thought content normal.         Judgment: Judgment normal.         Results for NOMAN DOSHI SR.    Ref.  Range 12/20/2019 11:27   Microalbumin/creat ratio (POC) Latest Ref Range: <30 MG/G <30

## 2019-12-20 NOTE — PROGRESS NOTES
Morgan Islas. is a 75 y.o. male (: 1955) presenting to address:    Chief Complaint   Patient presents with    Diabetes     Diabetes mellitus type 2, noninsulin dependent ([facility] [address]  f/u       Vitals:    19 0840   Weight: 188 lb 3.2 oz (85.4 kg)       Hearing/Vision:   No exam data present    Learning Assessment:     Learning Assessment 2019   PRIMARY LEARNER Patient   HIGHEST LEVEL OF EDUCATION - PRIMARY LEARNER  SOME COLLEGE   BARRIERS PRIMARY LEARNER NONE   CO-LEARNER CAREGIVER No   PRIMARY LANGUAGE ENGLISH   LEARNER PREFERENCE PRIMARY READING   ANSWERED BY patient   RELATIONSHIP SELF     Depression Screening:     3 most recent PHQ Screens 2019   Little interest or pleasure in doing things Not at all   Feeling down, depressed, irritable, or hopeless Not at all   Total Score PHQ 2 0   Trouble falling or staying asleep, or sleeping too much -   Feeling tired or having little energy -   Poor appetite, weight loss, or overeating -   Feeling bad about yourself - or that you are a failure or have let yourself or your family down -   Trouble concentrating on things such as school, work, reading, or watching TV -   Moving or speaking so slowly that other people could have noticed; or the opposite being so fidgety that others notice -   Thoughts of being better off dead, or hurting yourself in some way -   How difficult have these problems made it for you to do your work, take care of your home and get along with others -     Fall Risk Assessment:     Fall Risk Assessment, last 12 mths 2019   Able to walk? Yes   Fall in past 12 months? No   Fall with injury? -   Number of falls in past 12 months -   Fall Risk Score -     Abuse Screening:     Abuse Screening Questionnaire 3/1/2019   Do you ever feel afraid of your partner? N   Are you in a relationship with someone who physically or mentally threatens you? N   Is it safe for you to go home? Y     Coordination of Care Questionaire:   1. Have you been to the ER, urgent care clinic since your last visit? Hospitalized since your last visit? NO    2. Have you seen or consulted any other health care providers outside of the [address] [place] [address] since your last visit? Include any pap smears or colon screening. NO    Advanced Directive:   1. Do you have an Advanced Directive? YES    2. Would you like information on Advanced Directives?  NO

## 2019-12-20 NOTE — PATIENT INSTRUCTIONS
To Do: -  Keep up with what you are doing. .. because it is working. Notes from your doctor: Wagner Lopez will request results of your labs from [facility].  If I need anything else in addition to that, We will let you know. TESTING RESULTS Results will be released to [address] Normal results will be sent via mail. If you have questions about your results, please schedule a follow up appointment to discuss with your PCP. MEDICATION REFILLS Please allow at least 2 business days for refill requests to be addressed. Medication refills may be requested through your pharmacy. Refills will not be provided by the after hours/on call provider.

## 2019-12-23 DIAGNOSIS — M51.37 DDD (DEGENERATIVE DISC DISEASE), LUMBOSACRAL: ICD-10-CM

## 2019-12-23 DIAGNOSIS — M06.9 RHEUMATOID ARTHRITIS INVOLVING MULTIPLE SITES, UNSPECIFIED RHEUMATOID FACTOR PRESENCE: ICD-10-CM

## 2019-12-23 RX ORDER — GABAPENTIN 100 MG/1
CAPSULE ORAL
Qty: 180 CAP | Refills: 1 | Status: SHIPPED | OUTPATIENT
Start: 2019-12-23 | End: 2020-09-16 | Stop reason: SDUPTHER

## 2020-01-14 ENCOUNTER — TELEPHONE (OUTPATIENT)
Dept: FAMILY MEDICINE CLINIC | Age: 65
End: 2020-01-14

## 2020-01-14 DIAGNOSIS — I10 ESSENTIAL HYPERTENSION: Chronic | ICD-10-CM

## 2020-01-14 DIAGNOSIS — E78.5 HYPERLIPIDEMIA WITH TARGET LDL LESS THAN 100: Chronic | ICD-10-CM

## 2020-01-14 DIAGNOSIS — E55.9 VITAMIN D DEFICIENCY: Chronic | ICD-10-CM

## 2020-01-14 DIAGNOSIS — M06.9 RHEUMATOID ARTHRITIS INVOLVING MULTIPLE SITES, UNSPECIFIED RHEUMATOID FACTOR PRESENCE: Chronic | ICD-10-CM

## 2020-01-14 DIAGNOSIS — E11.9 DIABETES MELLITUS TYPE 2, NONINSULIN DEPENDENT (HCC): Primary | Chronic | ICD-10-CM

## 2020-01-14 DIAGNOSIS — R79.89 ELEVATED TSH: ICD-10-CM

## 2020-01-14 NOTE — TELEPHONE ENCOUNTER
Results from [facility] received and in [address] folder. After reviewing in patient needs any other test please place orders so he can come  lab slip to take to have done at [facility] so he wont have to pay out of pocket.

## 2020-01-20 NOTE — TELEPHONE ENCOUNTER
Please call Barrett Ge Sr.     I don't need any further labs to be done at this time. Just fasting labs to be done ~1-2 weeks prior to upcoming appt. Lab slip printed.     Orders Placed This Encounter    HEMOGLOBIN A1C WITH EAG    CBC WITH AUTOMATED DIFF    METABOLIC PANEL, COMPREHENSIVE    T4, FREE    LIPID PANEL    TSH 3RD GENERATION    VITAMIN D, 25 HYDROXY         Future Appointments   Date Time Provider Gela Mcdonald   3/20/2020  8:45 AM Len Poole MD [facility]

## 2020-03-01 DIAGNOSIS — E11.9 DIABETES MELLITUS TYPE 2, NONINSULIN DEPENDENT (HCC): Chronic | ICD-10-CM

## 2020-03-01 DIAGNOSIS — I10 ESSENTIAL HYPERTENSION: Chronic | ICD-10-CM

## 2020-03-01 DIAGNOSIS — E55.9 VITAMIN D DEFICIENCY: Chronic | ICD-10-CM

## 2020-03-01 DIAGNOSIS — E78.5 HYPERLIPIDEMIA WITH TARGET LDL LESS THAN 100: Chronic | ICD-10-CM

## 2020-03-01 DIAGNOSIS — M06.9 RHEUMATOID ARTHRITIS INVOLVING MULTIPLE SITES, UNSPECIFIED RHEUMATOID FACTOR PRESENCE: Chronic | ICD-10-CM

## 2020-03-01 DIAGNOSIS — R79.89 ELEVATED TSH: ICD-10-CM

## 2020-04-01 DIAGNOSIS — E11.9 DIABETES MELLITUS TYPE 2, NONINSULIN DEPENDENT (HCC): Chronic | ICD-10-CM

## 2020-04-01 DIAGNOSIS — I10 ESSENTIAL HYPERTENSION: ICD-10-CM

## 2020-04-01 RX ORDER — METFORMIN HYDROCHLORIDE 1000 MG/1
TABLET ORAL
Qty: 180 TAB | Refills: 1 | Status: SHIPPED | OUTPATIENT
Start: 2020-04-01 | End: 2020-11-10 | Stop reason: SDUPTHER

## 2020-04-01 RX ORDER — LOSARTAN POTASSIUM AND HYDROCHLOROTHIAZIDE 12.5; 1 MG/1; MG/1
TABLET ORAL
Qty: 90 TAB | Refills: 1 | Status: SHIPPED | OUTPATIENT
Start: 2020-04-01 | End: 2020-11-10 | Stop reason: SDUPTHER

## 2020-07-17 ENCOUNTER — TELEPHONE (OUTPATIENT)
Dept: FAMILY MEDICINE CLINIC | Age: 65
End: 2020-07-17

## 2020-07-17 ENCOUNTER — OFFICE VISIT (OUTPATIENT)
Dept: FAMILY MEDICINE CLINIC | Age: 65
End: 2020-07-17

## 2020-07-17 VITALS
BODY MASS INDEX: 27.25 KG/M2 | SYSTOLIC BLOOD PRESSURE: 144 MMHG | WEIGHT: 184 LBS | HEIGHT: 69 IN | HEART RATE: 74 BPM | DIASTOLIC BLOOD PRESSURE: 88 MMHG | RESPIRATION RATE: 16 BRPM | OXYGEN SATURATION: 98 % | TEMPERATURE: 97.6 F

## 2020-07-17 DIAGNOSIS — I10 ESSENTIAL HYPERTENSION: ICD-10-CM

## 2020-07-17 DIAGNOSIS — Z13.31 SCREENING FOR DEPRESSION: ICD-10-CM

## 2020-07-17 DIAGNOSIS — Z78.9 STATIN INTOLERANCE: ICD-10-CM

## 2020-07-17 DIAGNOSIS — E55.9 VITAMIN D DEFICIENCY: ICD-10-CM

## 2020-07-17 DIAGNOSIS — E11.40 TYPE 2 DIABETES MELLITUS WITH DIABETIC NEUROPATHY, WITHOUT LONG-TERM CURRENT USE OF INSULIN (HCC): ICD-10-CM

## 2020-07-17 DIAGNOSIS — M06.9 RHEUMATOID ARTHRITIS INVOLVING MULTIPLE SITES, UNSPECIFIED RHEUMATOID FACTOR PRESENCE: Chronic | ICD-10-CM

## 2020-07-17 DIAGNOSIS — E78.5 HYPERLIPIDEMIA WITH TARGET LDL LESS THAN 100: ICD-10-CM

## 2020-07-17 DIAGNOSIS — Z13.39 SCREENING FOR ALCOHOLISM: ICD-10-CM

## 2020-07-17 DIAGNOSIS — S39.012A STRAIN OF LUMBAR REGION, INITIAL ENCOUNTER: ICD-10-CM

## 2020-07-17 DIAGNOSIS — Z00.00 MEDICARE ANNUAL WELLNESS VISIT, SUBSEQUENT: Primary | ICD-10-CM

## 2020-07-17 DIAGNOSIS — R39.12 WEAK URINARY STREAM: ICD-10-CM

## 2020-07-17 RX ORDER — BACLOFEN 10 MG/1
10 TABLET ORAL
Qty: 30 TAB | Refills: 1 | Status: SHIPPED | OUTPATIENT
Start: 2020-07-17

## 2020-07-17 NOTE — PROGRESS NOTES
[facility], [place]  Primary Care Office Visit - Problem-Oriented (Separate from the Medicare Wellness Visit)    : 1955   Miko Baker Sr. is a 77 y.o. male    Assessment/Plan:     4. Rheumatoid arthritis involving multiple sites, unspecified rheumatoid factor presence ([address]  Followed by rheum. Labs today, will CC results to Dr. Carolann Johnson  - SED RATE (ESR); Future  - C REACTIVE PROTEIN, QT; Future    5. Type 2 diabetes mellitus with diabetic neuropathy, without long-term current use of insulin (HCC)  Unclear control. Labs today. No change to current regimen for now. Key Antihyperglycemic Medications             metFORMIN (GLUCOPHAGE) 1,000 mg tablet (Taking) TAKE 1 TABLET TWICE DAILY WITH MEALS          6. Essential hypertension  Ongoing, relatively well controlled. No change to current regimen for now. Key CAD CHF Meds             losartan-hydroCHLOROthiazide (HYZAAR) 100-12.5 mg per tablet (Taking) TAKE 1 TABLET EVERY DAY    aspirin 81 mg tablet (Taking) Take 1 Tab by mouth daily. 7. Hyperlipidemia with target LDL less than 100  with  8. Statin intolerance  Checking labs today. > need to consider trial of low dose statin every other day. 9. Vitamin D deficiency  Unclear control. Labs today. 10. Weak urinary stream  Initial encounter. Scored 5 on IPSS. Continue to monitor. Future considerations: referral to urology    12. Low back strain  Initial encounter. Trial of baclofen QHS PRN. Warned re: somnolence. Orders & Diagnoses associated with This Encounter:       ICD-10-CM ICD-9-CM   4. Rheumatoid arthritis involving multiple sites, unspecified rheumatoid factor presence (HCC)  M06.9 714.0   5. Type 2 diabetes mellitus with diabetic neuropathy, without long-term current use of insulin (HCC)  E11.40 250.60     357.2   6. Essential hypertension  I10 401.9   7. Hyperlipidemia with target LDL less than 100  E78.5 272.4   8. Statin intolerance  Z78.9 995.27   9. Vitamin D deficiency  E55.9 268.9   10. Weak urinary stream  R39.12 788.62   11. Strain of lumbar region, initial encounter  S39.012A 847.2         Orders Placed This Encounter    SED RATE (ESR)     Standing Status:   Future     Standing Expiration Date:   7/17/2021    C REACTIVE PROTEIN, QT     Standing Status:   Future     Standing Expiration Date:   7/17/2021    Annual  Alcohol Screen 15 min ()    baclofen (LIORESAL) 10 mg tablet     Sig: Take 1 Tab by mouth nightly as needed for Muscle Spasm(s). Dispense:  30 Tab     Refill:  1         Daisy Butler MD  Internal Medicine, Family Medicine & Sports Medicine  7/17/2020    History:   Chioma Haines Sr. is a 77 y.o. male presenting to address:    Chief Complaint   Patient presents with   [facility] Wellness Visit            Last visit: Dec 2019    Overall doing okay. Social distancing appropriately. Doing a lot of cutting grass. Wife feels as though he is looking skinnier, although he believes this is from not lifting weights as much at the gym. Has upcoming appt with rheumatology. Would like to get all bloodwork done today. Wife reports concerns about his \"urine stream\", although he feels as though it is \"normal\". Nocturia only 0-1x/night. I-PSS score = 6  Answered \"mixed\" for \"If you were to spend the rest of your life with your urinary condition just the way it is now, how would you feel about that? \"  [see scanned document]    [ see AWV documentation for pertinent PMHx, PSHx, FHx, allergies & meds]     Problem List:     [ see AWV documentation for active problem list ]     Review of Systems:     (only positive ROS in this note, all negatives included in  [address] documentation)    Review of Systems   Musculoskeletal: Positive for joint pain (chronic, stable). Physical Assessment:   VS:  [ see AWV documentation for Vital Signs ]    Physical Exam  Nursing note reviewed.    Constitutional:       General: He is not in acute distress. Appearance: He is well-developed. He is not diaphoretic. HENT:      Head: Normocephalic and atraumatic. Right Ear: External ear normal.      Left Ear: External ear normal.   Eyes:      Pupils: Pupils are equal, round, and reactive to light. Neck:      Musculoskeletal: Normal range of motion and neck supple. Cardiovascular:      Rate and Rhythm: Normal rate and regular rhythm. Heart sounds: Normal heart sounds. No murmur. Pulmonary:      Effort: Pulmonary effort is normal. No respiratory distress. Breath sounds: Normal breath sounds. No wheezing. Musculoskeletal: Normal range of motion. Skin:     General: Skin is warm and dry. Neurological:      Mental Status: He is alert and oriented to person, place, and time. Cranial Nerves: No cranial nerve deficit. Psychiatric:         Behavior: Behavior normal.         Thought Content:  Thought content normal.         Judgment: Judgment normal.

## 2020-07-17 NOTE — PROGRESS NOTES
Edelmira Green is a 77 y.o. male (: 1955) presenting to address:    Chief Complaint   Patient presents with    Annual Wellness Visit            Vitals:    20 1022 20 1036 20 1042   BP: (!) 161/99 160/90 144/88   Pulse: 74     Resp: 16     Temp: 97.6 °F (36.4 °C)     TempSrc: Temporal     SpO2: 98%     Weight: 184 lb (83.5 kg)     Height: 5' 9\" (1.753 m)     PainSc:   1     PainLoc: Back         Hearing/Vision:      Hearing Screening    125Hz 250Hz 500Hz 1000Hz 2000Hz 3000Hz 4000Hz 6000Hz 8000Hz   Right ear:   38 43 40  40     Left ear:   0 40 40  0        Visual Acuity Screening    Right eye Left eye Both eyes   Without correction: 20/30 20/40 20/25   With correction:          Learning Assessment:     Learning Assessment 2019   PRIMARY LEARNER Patient   HIGHEST LEVEL OF EDUCATION - PRIMARY LEARNER  SOME COLLEGE   BARRIERS PRIMARY LEARNER NONE   CO-LEARNER CAREGIVER No   PRIMARY LANGUAGE ENGLISH   LEARNER PREFERENCE PRIMARY READING   ANSWERED BY patient   RELATIONSHIP SELF     Depression Screening:     3 most recent PHQ Screens 2020   Little interest or pleasure in doing things Not at all   Feeling down, depressed, irritable, or hopeless Not at all   Total Score PHQ 2 0   Trouble falling or staying asleep, or sleeping too much -   Feeling tired or having little energy -   Poor appetite, weight loss, or overeating -   Feeling bad about yourself - or that you are a failure or have let yourself or your family down -   Trouble concentrating on things such as school, work, reading, or watching TV -   Moving or speaking so slowly that other people could have noticed; or the opposite being so fidgety that others notice -   Thoughts of being better off dead, or hurting yourself in some way -   How difficult have these problems made it for you to do your work, take care of your home and get along with others -     Fall Risk Assessment:     Fall Risk Assessment, last 12 mths 7/17/2020   Able to walk? Yes   Fall in past 12 months? No   Fall with injury? -   Number of falls in past 12 months -   Fall Risk Score -     Abuse Screening:     Abuse Screening Questionnaire 7/17/2020   Do you ever feel afraid of your partner? N   Are you in a relationship with someone who physically or mentally threatens you? N   Is it safe for you to go home? Y     Coordination of Care Questionaire:   1. Have you been to the ER, urgent care clinic since your last visit? Hospitalized since your last visit? NO    2. Have you seen or consulted any other health care providers outside of the [address] [place] [address] since your last visit? Include any pap smears or colon screening. NO    Advanced Directive:   1. Do you have an Advanced Directive? YES (will bring copy)    2. Would you like information on Advanced Directives?  NO

## 2020-07-17 NOTE — TELEPHONE ENCOUNTER
Patient called and left message stating he forgot to mention that his ears felt clogged at appointment. He is requesting something to help with this. Please advise.

## 2020-07-17 NOTE — PATIENT INSTRUCTIONS
To Do: - work on some of those exercises below for your back (lower and upper)  - try the baclofen (muscle relaxant) at night when your back is really bothering you; if it works well, and you want me to send it to [facility] [employer], just let me know      Notes from your doctor:  - I will send your results to your rheumatologist      Medicare Wellness Notes:  - Included below is some information about Advance Directives. There is also an excellent website for it for the state of [place] ([email or web address]); if you end up making an Advance Directive, bring me a copy so I can put it in your medical record  - We did your medicare wellness visit today. Below is your \"5 year preventive services plan\"  - Per our records, you are behind on some immunizations. Since you have Medicare, it would be more affordable for you to obtain this immunizations from your local commerical pharmacy (since your Medicare Part D will cover them). Please bring this to your local pharmacy to show them. If you decide to get your vaccines, please ask them to fax our office a copy of the records so our information is also up-to-date. My direct fax is [phone]      Health Maintenance Due   Topic Date Due    Shingles Vaccine (1 of 2) 05/05/2003    Eye Exam  12/14/2018    Glaucoma Screening   06/05/2019    Annual Well Visit  03/01/2020       Medicare Wellness Visit, Male    The best way to live healthy is to have a lifestyle where you eat a well-balanced diet, exercise regularly, limit alcohol use, and quit all forms of tobacco/nicotine, if applicable. Regular preventive services are another way to keep healthy. Preventive services (vaccines, screening tests, monitoring & exams) can help personalize your care plan, which helps you manage your own care. Screening tests can find health problems at the earliest stages, when they are easiest to treat.    [facility] follows the current, evidence-based guidelines published by the [facility] (USPSTF) when recommending preventive services for our patients. Because we follow these guidelines, sometimes recommendations change over time as research supports it. (For example, a prostate screening blood test is no longer routinely recommended for men with no symptoms). Of course, you and your doctor may decide to screen more often for some diseases, based on your risk and co-morbidities (chronic disease you are already diagnosed with). Preventive services for you include:  - Medicare offers their members a free annual wellness visit, which is time for you and your primary care provider to discuss and plan for your preventive service needs. Take advantage of this benefit every year!  -All adults over age 72 should receive the recommended pneumonia vaccines. Current USPSTF guidelines recommend a series of two vaccines for the best pneumonia protection.   -All adults should have a flu vaccine yearly and tetanus vaccine every 10 years.  -All adults age 48 and older should receive the shingles vaccines (series of two vaccines). -All adults age 38-68 who are overweight should have a diabetes screening test once every three years.   -Other screening tests & preventive services for persons with diabetes include: an eye exam to screen for diabetic retinopathy, a kidney function test, a foot exam, and stricter control over your cholesterol.   -Cardiovascular screening for adults with routine risk involves an electrocardiogram (ECG) at intervals determined by the provider.   -Colorectal cancer screening should be done for adults age 54-65 with no increased risk factors for colorectal cancer. There are a number of acceptable methods of screening for this type of cancer. Each test has its own benefits and drawbacks. Discuss with your provider what is most appropriate for you during your annual wellness visit.  The different tests include: colonoscopy (considered the best screening method), a fecal occult blood test, a fecal DNA test, and sigmoidoscopy.  -All adults born between [facility] should be screened once for Hepatitis C.  -An Abdominal Aortic Aneurysm (AAA) Screening is recommended for men age 73-68 who has ever smoked in their lifetime. Rhomboid Muscle Strain: Rehab Exercises  Introduction  Here are some examples of exercises for you to try. The exercises may be suggested for a condition or for rehabilitation. Start each exercise slowly. Ease off the exercises if you start to have pain. You will be told when to start these exercises and which ones will work best for you. How to do the exercises  Lower neck and upper back (rhomboid) stretch   1. Stretch your arms out in front of your body. Clasp one hand on top of your other hand. 2. Gently reach out so that you feel your shoulder blades stretching away from each other. 3. Gently bend your head forward. 4. Hold for 15 to 30 seconds. 5. Repeat 2 to 4 times. Resisted rows   For this exercise, you will need elastic exercise material, such as surgical tubing or Thera-Band. 1. Put the band around a solid object, such as a bedpost, at about waist level. Stand facing where you have placed the band. Hold equal lengths of the band in each hand. 2. Start with your arms held out in front of you. 3. Pull the bands back, and move your shoulder blades together. As you finish, your elbows should be at your side and bent at 90 degrees (like the angle of the letter \"L\"). 4. Return to the starting position. 5. Repeat 8 to 12 times. Neck stretches   1. Look straight ahead, and tip your right ear to your right shoulder. Do not let your left shoulder rise as you tip your head to the right. 2. Hold for 15 to 30 seconds. 3. Tilt your head to the left. Do not let your right shoulder rise as you tip your head to the left. 4. Hold for 15 to 30 seconds. 5. Repeat 2 to 4 times to each side.     Neck rotation   1. Sit in a firm chair, or stand up straight. 2. Keeping your chin level, turn your head to the right, and hold for 15 to 30 seconds. 3. Turn your head to the left, and hold for 15 to 30 seconds. 4. Repeat 2 to 4 times to each side. Follow-up care is a key part of your treatment and safety. Be sure to make and go to all appointments, and call your doctor if you are having problems. It's also a good idea to know your test results and keep a list of the medicines you take. Where can you learn more? Go to http://nikita-hieu.info/  Enter A572 in the search box to learn more about \"Rhomboid Muscle Strain: Rehab Exercises. \"  Current as of: March 2, 2020               Content Version: 12.5  © 5929-7229 Healthwise, Incorporated. Care instructions adapted under license by [employer] (which disclaims liability or warranty for this information). If you have questions about a medical condition or this instruction, always ask your healthcare professional. [place] [address] any warranty or liability for your use of this information. Sacroiliac Pain: Exercises  Introduction  Here are some examples of exercises for you to try. The exercises may be suggested for a condition or for rehabilitation. Start each exercise slowly. Ease off the exercises if you start to have pain. You will be told when to start these exercises and which ones will work best for you. How to do the exercises  Knee-to-chest stretch   6. Do not do the knee-to-chest exercise if it causes or increases back or leg pain. 7. Lie on your back with your knees bent and your feet flat on the floor. You can put a small pillow under your head and neck if it is more comfortable. 8. Grasp your hands under one knee and bring the knee to your chest, keeping the other foot flat on the floor. 9. Keep your lower back pressed to the floor. Hold for at least 15 to 30 seconds.   10. Relax and lower the knee to the starting position. Repeat with the other leg. 11. Repeat 2 to 4 times with each leg. 12. To get more stretch, keep your other leg flat on the floor while pulling your knee to your chest.    Bridging   6. Lie on your back with both knees bent. Your knees should be bent about 90 degrees. 7. Tighten your belly muscles by pulling in your belly button toward your spine. Then push your feet into the floor, squeeze your buttocks, and lift your hips off the floor until your shoulders, hips, and knees are all in a straight line. 8. Hold for about 6 seconds as you continue to breathe normally, and then slowly lower your hips back down to the floor and rest for up to 10 seconds. 9. Repeat 8 to 12 times. Hip extension   6. Get down on your hands and knees on the floor. 7. Keeping your back and neck straight, lift one leg straight out behind you. When you lift your leg, keep your hips level. Don't let your back twist, and don't let your hip drop toward the floor. 8. Hold for 6 seconds. Repeat 8 to 12 times with each leg. 9. If you feel steady and strong when you do this exercise, you can make it more difficult. To do this, when you lift your leg, also lift the opposite arm straight out in front of you. For example, lift the left leg and the right arm at the same time. (This is sometimes called the \"bird dog exercise. \") Hold for 6 seconds, and repeat 8 to 12 times on each side. Clamshell   5. Lie on your side with a pillow under your head. Keep your feet and knees together and your knees bent. 6. Raise your top knee, but keep your feet together. Do not let your hips roll back. Your legs should open up like a clamshell. 7. Hold for 6 seconds. 8. Slowly lower your knee back down. Rest for 10 seconds. 9. Repeat 8 to 12 times. 10. Switch to your other side and repeat steps 1 through 5. Hamstring wall stretch   1. Lie on your back in a doorway, with one leg through the open door.   2. Slide your affected leg up the wall to straighten your knee. You should feel a gentle stretch down the back of your leg. 3. Hold the stretch for at least 1 minute to begin. Then try to lengthen the time you hold the stretch to as long as 6 minutes. 4. Switch legs, and repeat steps 1 through 3.  5. Repeat 2 to 4 times. 6. If you do not have a place to do this exercise in a doorway, there is another way to do it:  7. Lie on your back, and bend one knee. 8. Loop a towel under the ball and toes of that foot, and hold the ends of the towel in your hands. 9. Straighten your knee, and slowly pull back on the towel. You should feel a gentle stretch down the back of your leg. 10. Switch legs, and repeat steps 1 through 3.  11. Repeat 2 to 4 times. 1. Do not arch your back. 2. Do not bend either knee. 3. Keep one heel touching the floor and the other heel touching the wall. Do not point your toes. Lower abdominal strengthening   1. Lie on your back with your knees bent and your feet flat on the floor. 2. Tighten your belly muscles by pulling your belly button in toward your spine. 3. Lift one foot off the floor and bring your knee toward your chest, so that your knee is straight above your hip and your leg is bent like the letter \"L. \"  4. Lift the other knee up to the same position. 5. Lower one leg at a time to the starting position. 6. Keep alternating legs until you have lifted each leg 8 to 12 times. 7. Be sure to keep your belly muscles tight and your back still as you are moving your legs. Be sure to breathe normally. Piriformis stretch   1. Lie on your back with your legs straight. 2. Lift your affected leg, and bend your knee. With your opposite hand, reach across your body, and then gently pull your knee toward your opposite shoulder. 3. Hold the stretch for 15 to 30 seconds. 4. Switch legs and repeat steps 1 through 3.  5. Repeat 2 to 4 times. Follow-up care is a key part of your treatment and safety. Be sure to make and go to all appointments, and call your doctor if you are having problems. It's also a good idea to know your test results and keep a list of the medicines you take. Where can you learn more? Go to http://nikita-hieu.info/  Enter M468 in the search box to learn more about \"Sacroiliac Pain: Exercises. \"  Current as of: March 2, 2020               Content Version: 12.5  © 2006-2020 [employer]. Care instructions adapted under license by [employer] (which disclaims liability or warranty for this information). If you have questions about a medical condition or this instruction, always ask your healthcare professional. [address] any warranty or liability for your use of this information. Low Back Arthritis: Exercises  Introduction  Here are some examples of typical rehabilitation exercises for your condition. Start each exercise slowly. Ease off the exercise if you start to have pain. Your doctor or physical therapist will tell you when you can start these exercises and which ones will work best for you. When you are not being active, find a comfortable position for rest. Some people are comfortable on the floor or a medium-firm bed with a small pillow under their head and another under their knees. Some people prefer to lie on their side with a pillow between their knees. Don't stay in one position for too long. Take short walks (10 to 20 minutes) every 2 to 3 hours. Avoid slopes, hills, and stairs until you feel better. Walk only distances you can manage without pain, especially leg pain. How to do the exercises  Pelvic tilt   13. Lie on your back with your knees bent. 14. \"Brace\" your stomachtighten your muscles by pulling in and imagining your belly button moving toward your spine. 15. Press your lower back into the floor. You should feel your hips and pelvis rock back.   16. Hold for 6 seconds while breathing smoothly. 17. Relax and allow your pelvis and hips to rock forward. 18. Repeat 8 to 12 times. Back stretches   10. Get down on your hands and knees on the floor. 11. Relax your head and allow it to droop. Round your back up toward the ceiling until you feel a nice stretch in your upper, middle, and lower back. Hold this stretch for as long as it feels comfortable, or about 15 to 30 seconds. 12. Return to the starting position with a flat back while you are on your hands and knees. 13. Let your back sway by pressing your stomach toward the floor. Lift your buttocks toward the ceiling. 14. Hold this position for 15 to 30 seconds. 15. Repeat 2 to 4 times. Follow-up care is a key part of your treatment and safety. Be sure to make and go to all appointments, and call your doctor if you are having problems. It's also a good idea to know your test results and keep a list of the medicines you take. Where can you learn more? Go to http://www.pepper.com/  Enter T094 in the search box to learn more about \"Low Back Arthritis: Exercises. \"  Current as of: March 2, 2020               Content Version: 12.5  © 5731-6887 Healthwise, Incorporated. Care instructions adapted under license by [employer] (which disclaims liability or warranty for this information). If you have questions about a medical condition or this instruction, always ask your healthcare professional. [place] [address] any warranty or liability for your use of this information.

## 2020-07-17 NOTE — PROGRESS NOTES
Date of Service:  2020   Patient Name:  Angelique Conroy Sr.   Patient :  1955       This is the Subsequent Medicare Annual Wellness Exam, performed 12 months or more after the Initial AWV or the last Subsequent AWV     I have reviewed the patient's medical history in detail and updated the computerized patient record. History     Past Medical History:   Diagnosis Date    Diabetes ([address]     H. pylori infection     s/p prevpack    H/O esophagogastroduodenoscopy 3/1/2013    irregular z-line, small 1cm submucosal nodule    Hypercholesterolemia     Hypertension     Rheumatoid arthritis(714.0)       Past Surgical History:   Procedure Laterality Date    ENDOSCOPY, COLON, DIAGNOSTIC      HX HERNIA REPAIR Right     HX WISDOM TEETH EXTRACTION       Current Outpatient Medications   Medication Sig Dispense Refill    metFORMIN (GLUCOPHAGE) 1,000 mg tablet TAKE 1 TABLET TWICE DAILY WITH MEALS 180 Tab 1    losartan-hydroCHLOROthiazide (HYZAAR) 100-12.5 mg per tablet TAKE 1 TABLET EVERY DAY 90 Tab 1    gabapentin (NEURONTIN) 100 mg capsule TAKE 2 CAPSULES EVERY NIGHT 180 Cap 1    ergocalciferol (ERGOCALCIFEROL) 50,000 unit capsule Take 1 Cap by mouth every seven (7) days. 24 Cap 3    Blood Glucose Control High&Low (ACCU-CHEK FREIDA CONTROL SOLN) soln Use to run controls 1 Bottle 3    Blood-Glucose Meter (ACCU-CHEK FREIDA PLUS METER) [facility] Use to test blood sugar twice a day. 1 Each 0    glucose blood VI test strips (ACCU-CHEK FREIDA PLUS TEST STRP) strip Use to test blood sugar twice a day. 200 Strip 3    lancets (ACCU-CHEK SOFTCLIX LANCETS) misc Test Blood sugar twice daily 200 Each 3    alcohol swabs (BD SINGLE USE SWABS REGULAR) padm Use to test blood sugar twice a day. 200 Pad 3    loratadine 10 mg cap Take 10 mg by mouth daily as needed.  fluticasone (FLONASE) 50 mcg/actuation nasal spray 2 Sprays by Both Nostrils route daily.  (Patient taking differently: 2 Sprays by Both Nostrils route daily as needed.) 3 Bottle 1    traMADol (ULTRAM) 50 mg tablet TAKE 1 TABLET EVERY 8 HOURS AS NEEDED FOR PAIN  ( MAXIMUM DAILY AMOUNT:  150MG  ) 45 Tab 1    leflunomide (ARAVA) 20 mg tablet Take 1 Tab by mouth daily. 90 Tab 1    acetaminophen (TYLENOL) 500 mg tablet 500 mg as needed.  red yeast rice extract 600 mg cap Take 1,800 mg by mouth two (2) times a day.  aspirin 81 mg tablet Take 1 Tab by mouth daily.  30 Tab 2     Allergies   Allergen Reactions    Statins-Hmg-Coa Reductase Inhibitors Myalgia     Family History   Problem Relation Age of Onset    Heart Disease Mother     Heart Disease Father    Xiomy Esther Arthritis-rheumatoid Sister     Hypertension Sister     Heart Disease Sister     Heart Disease Brother      Social History     Tobacco Use    Smoking status: Former Smoker    Smokeless tobacco: Never Used   Substance Use Topics    Alcohol use: No     Patient Active Problem List   Diagnosis Code    Hyperlipidemia with target LDL less than 100 E78.5    Diabetes mellitus type 2, noninsulin dependent ([address] E11.9    Rheumatoid arthritis ([address] M06.9    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    Chronic gastritis K29.50    Esophagitis K20.9    Neural foraminal stenosis of cervical spine M99.81    Routine health maintenance Z00.00    Myalgia M79.10    TERRY (obstructive sleep apnea) G47.33    Decreased libido R68.82    Elevated TSH R79.89    Vitamin D deficiency E55.9    Right thyroid nodule E04.1    Essential hypertension I10    Statin intolerance Z78.9    DDD (degenerative disc disease), lumbosacral M51.37    Type 2 diabetes mellitus with diabetic neuropathy (HCC) E11.40       Depression Risk Factor Screening:     3 most recent PHQ Screens 7/17/2020   Little interest or pleasure in doing things Not at all   Feeling down, depressed, irritable, or hopeless Not at all   Total Score PHQ 2 0   Trouble falling or staying asleep, or sleeping too much -   Feeling tired or having little energy -   Poor appetite, weight loss, or overeating -   Feeling bad about yourself - or that you are a failure or have let yourself or your family down -   Trouble concentrating on things such as school, work, reading, or watching TV -   Moving or speaking so slowly that other people could have noticed; or the opposite being so fidgety that others notice -   Thoughts of being better off dead, or hurting yourself in some way -   How difficult have these problems made it for you to do your work, take care of your home and get along with others -       Alcohol Risk Factor Screening: You do not drink alcohol or very rarely. Functional Ability and Level of Safety:   Hearing Loss  Hearing is good. Activities of Daily Living  The home contains: no safety equipment. Patient does total self care    Fall Risk  Fall Risk Assessment, last 12 mths 7/17/2020   Able to walk? Yes   Fall in past 12 months? No   Fall with injury? -   Number of falls in past 12 months -   Fall Risk Score -       Abuse Screen  Patient is not abused    Review of Systems   A comprehensive review of systems was negative except for that written in the separate problem-oriented documentation.     Physical Examination     VS:   Visit Vitals  /90 (BP 1 Location: Right arm, BP Patient Position: Sitting)   Pulse 74   Temp 97.6 °F (36.4 °C) (Temporal)   Resp 16   Ht 5' 9\" (1.753 m)   Wt 184 lb (83.5 kg)   SpO2 98%   BMI 27.17 kg/m²        Hearing Screening    125Hz 250Hz 500Hz 1000Hz 2000Hz 3000Hz 4000Hz 6000Hz 8000Hz   Right ear:   40 40 40  40     Left ear:   0 40 40  0        Visual Acuity Screening    Right eye Left eye Both eyes   Without correction: 20/30 20/40 20/25   With correction:           Evaluation of Cognitive Function:  Mood/affect:  neutral  Appearance: age appropriate  Has your family/caregiver stated any concerns about your memory: no  Normal      Patient Care Team   Patient Care Team:  La Faith MD as PCP - General (Family Practice)  Pilar Rojas MD as PCP - [facility] Empaneled Provider  Lluvia Degroot MD as Consulting Provider (Neurosurgery)  Denia Osman MD as Consulting Provider (Gastroenterology)  Elvira Andrews MD as Consulting Provider (Sleep Medicine)  Rebecca Desai MD as Consulting Provider (Endocrinology)  Alida Boone OD as Consulting Provider (Optometry)  Gissell Walter MD as Consulting Provider (Ophthalmology)  Latanya Blum MD as Consulting Provider (Gastroenterology)  Carey Patterson MD (Rheumatology)      Advice/Counseling   Education and counseling provided:  Are appropriate based on today's review and evaluation  End-of-Life planning (with patient's consent)  Pneumococcal Vaccine  Prostate cancer screening tests (PSA, covered annually)  Colorectal cancer screening tests  Cardiovascular screening blood test  Medical nutrition therapy for individuals with diabetes or renal disease  Diabetes outpatient self-management training services      Assessment/Plan   Diagnoses and all orders for this visit:    1. Medicare annual wellness visit, subsequent    2.  Screening for alcoholism  -     CO ANNUAL ALCOHOL SCREEN 15 MIN    3. Screening for depression  -     [place] Maintenance Due   Topic Date Due    Shingrix Vaccine Age 49> (1 of 2) 05/05/2003    Eye Exam Retinal or Dilated  12/14/2018    GLAUCOMA SCREENING Q2Y  06/05/2019    Medicare Yearly Exam  03/01/2020         Lawerence Denver Sr. was provided a written 5-year personalized preventive services plan, which was included in his AVS.    Maldonado Barraza MD  Internal Medicine, Family Medicine & Sports Medicine

## 2020-07-18 LAB
25(OH)D3+25(OH)D2 SERPL-MCNC: 49.7 NG/ML (ref 30–100)
ALBUMIN SERPL-MCNC: 4.5 G/DL (ref 3.8–4.8)
ALBUMIN/GLOB SERPL: 1.4 {RATIO} (ref 1.2–2.2)
ALP SERPL-CCNC: 77 IU/L (ref 39–117)
ALT SERPL-CCNC: 16 IU/L (ref 0–44)
AST SERPL-CCNC: 14 IU/L (ref 0–40)
BASOPHILS # BLD AUTO: 0 X10E3/UL (ref 0–0.2)
BASOPHILS NFR BLD AUTO: 1 %
BILIRUB SERPL-MCNC: 0.3 MG/DL (ref 0–1.2)
BUN SERPL-MCNC: 11 MG/DL (ref 8–27)
BUN/CREAT SERPL: 15 (ref 10–24)
CALCIUM SERPL-MCNC: 9.7 MG/DL (ref 8.6–10.2)
CHLORIDE SERPL-SCNC: 103 MMOL/L (ref 96–106)
CHOLEST SERPL-MCNC: 239 MG/DL (ref 100–199)
CO2 SERPL-SCNC: 24 MMOL/L (ref 20–29)
CREAT SERPL-MCNC: 0.72 MG/DL (ref 0.76–1.27)
CRP SERPL-MCNC: 3 MG/L (ref 0–10)
EOSINOPHIL # BLD AUTO: 0.3 X10E3/UL (ref 0–0.4)
EOSINOPHIL NFR BLD AUTO: 6 %
ERYTHROCYTE [DISTWIDTH] IN BLOOD BY AUTOMATED COUNT: 15.9 % (ref 11.6–15.4)
ERYTHROCYTE [SEDIMENTATION RATE] IN BLOOD BY WESTERGREN METHOD: 30 MM/HR (ref 0–30)
EST. AVERAGE GLUCOSE BLD GHB EST-MCNC: 128 MG/DL
GLOBULIN SER CALC-MCNC: 3.3 G/DL (ref 1.5–4.5)
GLUCOSE SERPL-MCNC: 89 MG/DL (ref 65–99)
HBA1C MFR BLD: 6.1 % (ref 4.8–5.6)
HCT VFR BLD AUTO: 42.5 % (ref 37.5–51)
HDLC SERPL-MCNC: 46 MG/DL
HGB BLD-MCNC: 13.7 G/DL (ref 13–17.7)
IMM GRANULOCYTES # BLD AUTO: 0 X10E3/UL (ref 0–0.1)
IMM GRANULOCYTES NFR BLD AUTO: 0 %
INTERPRETATION, 910389: NORMAL
LDLC SERPL CALC-MCNC: 171 MG/DL (ref 0–99)
LYMPHOCYTES # BLD AUTO: 1.7 X10E3/UL (ref 0.7–3.1)
LYMPHOCYTES NFR BLD AUTO: 32 %
MCH RBC QN AUTO: 25.3 PG (ref 26.6–33)
MCHC RBC AUTO-ENTMCNC: 32.2 G/DL (ref 31.5–35.7)
MCV RBC AUTO: 79 FL (ref 79–97)
MONOCYTES # BLD AUTO: 0.5 X10E3/UL (ref 0.1–0.9)
MONOCYTES NFR BLD AUTO: 9 %
NEUTROPHILS # BLD AUTO: 2.9 X10E3/UL (ref 1.4–7)
NEUTROPHILS NFR BLD AUTO: 52 %
PLATELET # BLD AUTO: 277 X10E3/UL (ref 150–450)
POTASSIUM SERPL-SCNC: 4.3 MMOL/L (ref 3.5–5.2)
PROT SERPL-MCNC: 7.8 G/DL (ref 6–8.5)
RBC # BLD AUTO: 5.41 X10E6/UL (ref 4.14–5.8)
SODIUM SERPL-SCNC: 144 MMOL/L (ref 134–144)
SPECIMEN STATUS REPORT, ROLRST: NORMAL
T4 FREE SERPL-MCNC: 1.01 NG/DL (ref 0.82–1.77)
TRIGL SERPL-MCNC: 111 MG/DL (ref 0–149)
TSH SERPL DL<=0.005 MIU/L-ACNC: 2.08 UIU/ML (ref 0.45–4.5)
VLDLC SERPL CALC-MCNC: 22 MG/DL (ref 5–40)
WBC # BLD AUTO: 5.4 X10E3/UL (ref 3.4–10.8)

## 2020-08-25 ENCOUNTER — OFFICE VISIT (OUTPATIENT)
Dept: FAMILY MEDICINE CLINIC | Age: 65
End: 2020-08-25

## 2020-08-25 VITALS
HEART RATE: 71 BPM | RESPIRATION RATE: 16 BRPM | SYSTOLIC BLOOD PRESSURE: 145 MMHG | WEIGHT: 186.6 LBS | OXYGEN SATURATION: 98 % | DIASTOLIC BLOOD PRESSURE: 89 MMHG | HEIGHT: 69 IN | TEMPERATURE: 98.2 F | BODY MASS INDEX: 27.64 KG/M2

## 2020-08-25 DIAGNOSIS — M06.9 RHEUMATOID ARTHRITIS INVOLVING MULTIPLE SITES, UNSPECIFIED RHEUMATOID FACTOR PRESENCE: ICD-10-CM

## 2020-08-25 DIAGNOSIS — Z78.9 STATIN INTOLERANCE: ICD-10-CM

## 2020-08-25 DIAGNOSIS — H61.23 BILATERAL IMPACTED CERUMEN: Primary | ICD-10-CM

## 2020-08-25 DIAGNOSIS — E55.9 VITAMIN D DEFICIENCY: ICD-10-CM

## 2020-08-25 DIAGNOSIS — Z12.5 SPECIAL SCREENING FOR MALIGNANT NEOPLASM OF PROSTATE: ICD-10-CM

## 2020-08-25 DIAGNOSIS — R73.03 PREDIABETES: ICD-10-CM

## 2020-08-25 DIAGNOSIS — E78.5 HYPERLIPIDEMIA WITH TARGET LDL LESS THAN 100: ICD-10-CM

## 2020-08-25 DIAGNOSIS — R79.89 ELEVATED TSH: ICD-10-CM

## 2020-08-25 DIAGNOSIS — E11.40 TYPE 2 DIABETES MELLITUS WITH DIABETIC NEUROPATHY, WITHOUT LONG-TERM CURRENT USE OF INSULIN (HCC): ICD-10-CM

## 2020-08-25 DIAGNOSIS — I10 ESSENTIAL HYPERTENSION: ICD-10-CM

## 2020-08-25 NOTE — PROGRESS NOTES
Madeline Vann is a 77 y.o. male (: 1955) presenting to address:    Chief Complaint   Patient presents with    Ear Fullness     bilateral ears     Labs     PSA test       Vitals:    20 1052   BP: 147/90   Pulse: 71   Resp: 16   Temp: 98.2 °F (36.8 °C)   TempSrc: Temporal   SpO2: 98%   Weight: 186 lb 9.6 oz (84.6 kg)   Height: 5' 9\" (1.753 m)   PainSc:   3   PainLoc: Back       Hearing/Vision:   No exam data present    Learning Assessment:     Learning Assessment 2019   PRIMARY LEARNER Patient   HIGHEST LEVEL OF EDUCATION - PRIMARY LEARNER  SOME COLLEGE   BARRIERS PRIMARY LEARNER NONE   CO-LEARNER CAREGIVER No   PRIMARY LANGUAGE ENGLISH   LEARNER PREFERENCE PRIMARY READING   ANSWERED BY patient   RELATIONSHIP SELF     Depression Screening:     3 most recent PHQ Screens 2020   Little interest or pleasure in doing things Not at all   Feeling down, depressed, irritable, or hopeless Not at all   Total Score PHQ 2 0   Trouble falling or staying asleep, or sleeping too much -   Feeling tired or having little energy -   Poor appetite, weight loss, or overeating -   Feeling bad about yourself - or that you are a failure or have let yourself or your family down -   Trouble concentrating on things such as school, work, reading, or watching TV -   Moving or speaking so slowly that other people could have noticed; or the opposite being so fidgety that others notice -   Thoughts of being better off dead, or hurting yourself in some way -   How difficult have these problems made it for you to do your work, take care of your home and get along with others -     Fall Risk Assessment:     Fall Risk Assessment, last 12 mths 2020   Able to walk? Yes   Fall in past 12 months? No   Fall with injury? -   Number of falls in past 12 months -   Fall Risk Score -     Abuse Screening:     Abuse Screening Questionnaire 2020   Do you ever feel afraid of your partner?  N   Are you in a relationship with someone who physically or mentally threatens you? N   Is it safe for you to go home? Y     Coordination of Care Questionaire:   1. Have you been to the ER, urgent care clinic since your last visit? Hospitalized since your last visit? NO    2. Have you seen or consulted any other health care providers outside of the [address] [place] [address] since your last visit? Include any pap smears or colon screening. NO    Advanced Directive:   1. Do you have an Advanced Directive? NO    2. Would you like information on Advanced Directives?  NO

## 2020-08-25 NOTE — Clinical Note
Can you call him to change around his appt? He is scheduled for an appt with me in November. ... I'd recommend doing his fasting bloodwork first week of Dec, with a scheduled appt with me 2nd week December. Eveline Hargrovechmaribel!

## 2020-08-26 ENCOUNTER — TELEPHONE (OUTPATIENT)
Dept: FAMILY MEDICINE CLINIC | Age: 65
End: 2020-08-26

## 2020-08-26 NOTE — TELEPHONE ENCOUNTER
Left message for patient to return call to scheduled fasting bloodwork first week of Dec, with a scheduled appt a week later and cancel the appointment that is scheduled in November per Dr. Adelina Oconnell.

## 2020-08-26 NOTE — PROGRESS NOTES
[facility]  Primary Care Office Visit - Problem-Oriented    : 1955   Elis Peña. is a 77 y.o. male presenting for  Chief Complaint   Patient presents with    Ear Fullness     bilateral ears     Labs     PSA test            Assessment/Plan:     # bilateral impacted cerumen  Unresponsive to manual curette. Resolved with bilateral lavage. # of note:  Labs ordered in advance to be done fasting in December, in anticipation of follow-up appointment        ICD-10-CM ICD-9-CM   1. Bilateral impacted cerumen  H61.23 380.4   2. Special screening for malignant neoplasm of prostate  Z12.5 V76.44   3. Rheumatoid arthritis involving multiple sites, unspecified rheumatoid factor presence (HCC)  M06.9 714.0   4. Type 2 diabetes mellitus with diabetic neuropathy, without long-term current use of insulin (HCC)  E11.40 250.60     357.2   5. Essential hypertension  I10 401.9   6. Hyperlipidemia with target LDL less than 100  E78.5 272.4   7. Statin intolerance  Z78.9 995.27   8. Vitamin D deficiency  E55.9 268.9   9. Elevated TSH  R79.89 794.5   10.  Prediabetes   R73.03 790.29       Orders Placed This Encounter    REMOVAL IMPACTED CERUMEN IRRIGATION/LVG UNILAT    REMOVAL IMPACTED CERUMEN IRRIGATION/LVG UNILAT    PSA Screening ([id number])     Standing Status:   Future     Standing Expiration Date:   2021    HEMOGLOBIN A1C WITH EAG     Standing Status:   Future     Standing Expiration Date:   2021    CBC WITH AUTOMATED DIFF     Standing Status:   Future     Standing Expiration Date:       METABOLIC PANEL, COMPREHENSIVE     Standing Status:   Future     Standing Expiration Date:   2021    T4, FREE     Standing Status:   Future     Standing Expiration Date:   2021    LIPID PANEL     Standing Status:   Future     Standing Expiration Date:   2021    TSH 3RD GENERATION     Standing Status:   Future     Standing Expiration Date:   2021    VITAMIN D, 25 HYDROXY     Standing Status:   Future     Standing Expiration Date:   8/25/2021    SED RATE (ESR)     Standing Status:   Future     Standing Expiration Date:   8/25/2021    C REACTIVE PROTEIN, QT     Standing Status:   Future     Standing Expiration Date:   8/25/2021    Digital Rectal Exam ()         This document may have been created with the aid of dictation software. Text may contain errors, particularly phonetic errors. Reviewed management plan & instructions with patient, who voiced understanding. Dionna Tran MD  Internal Medicine, Family Medicine & Sports Medicine  8/25/2020    Subjective   History:   Edwardo Prakash is a 77 y.o. male presenting to address:  Chief Complaint   Patient presents with    Ear Fullness     bilateral ears     Labs     PSA test       B ear fullness without mild discomfort. Otherwise doing well. Requesting PSA to be added to next blood draw. Upcoming appt with rheumatology ~Jan 2021. Past Medical History:   Diagnosis Date    Diabetes ([address]     H. pylori infection     s/p prevpack    H/O esophagogastroduodenoscopy 3/1/2013    irregular z-line, small 1cm submucosal nodule    Hypercholesterolemia     Hypertension     Rheumatoid arthritis(714.0)      Past Surgical History:   Procedure Laterality Date    ENDOSCOPY, COLON, DIAGNOSTIC      HX HERNIA REPAIR Right 1998    HX WISDOM TEETH EXTRACTION        reports that he has quit smoking. He has never used smokeless tobacco. He reports that he does not drink alcohol or use drugs.   Social History     Social History Narrative    Not on file     Social History     Tobacco Use   Smoking Status Former Smoker   Smokeless Tobacco Never Used     Family History   Problem Relation Age of Onset    Heart Disease Mother     Heart Disease Father    [address] [facility] Arthritis-rheumatoid Sister     Hypertension Sister     Heart Disease Sister     Heart Disease Brother      Allergies   Allergen Reactions    Statins-Hmg-Coa Reductase Inhibitors Myalgia       Problem List:      Patient Active Problem List    Diagnosis    Essential hypertension     - meds: losartan/HCTZ 50/12.5        Diabetes mellitus type 2, noninsulin dependent ([facility]     - meds: metformin 1000mg BID    - A1c 7.5 (Nov 2015)  - A1c 6.5 (4/14/2015)    - foot exam: 7/8/2014    Intolerant of statins.  Rheumatoid arthritis ([facility]     -1/16/2017 [Dr. Sapna Prieto: seropositive long-standing disease on arava monotherapy, reporting RAPID3 16.3; sicca syndrome; r/o Sjogrens, labs, possibly US to eval disease activity    -4/26/2016: [Rheum] refilled leflunomide 20 mg daily, noted that he would be switching to a rheumatologist closer to home  -2/17/2016: continue on leflunomide, plaquenil, labs: cbc, ESR, CMP, CRP, B hand xrays;  follow up 2 mo    Previously followed by Dr. Jitendra Crowe (rheum)    -- 9/29/2014 [rheum]: cont humira, plaquenil & tramadol; stop prednisone; flu shot given  -- 7/29/2014 [rheum]: cont humira & plaquenil; reduce prednisone to 2.5mg daily  -- 4/29/2014 [rheum]: combo of RA & DJD; continue humira, plaquenil & prednisone 5mg daily; added tramadol qhs prn; avoid NSAIDs; recs: check neck US to rule out lymph node swelling (R side, above clavicle)  -- Mar 2013: RF = 588      Hyperlipidemia with target LDL less than 100     - statin intolerant    -- tot 258 (H), HDL 45, , LDLc 186 (H) (1/10/2015); 10yr ASCVD risk 22.5%  -- tot 146, , HDL 51, LDLc 72 (7/8/2014)  -- Sept 2013: tot 139, LDL 76, HDL 42,       Chronic gastritis    Esophagitis     Chronic esophgitis by bx with irregular z-line (Mar 2013) by Dr. Trudi Hart ([facility]).  Type 2 diabetes mellitus with diabetic neuropathy ([facility]    DDD (degenerative disc disease), lumbosacral     L-spine XR (Dec 2016): Mild L4-S1 degenerative endplate reactive changes and disc space height loss. Moderate bilateral facet arthropathy L4-S1.  Bilateral oblique views demonstrate moderate right and mild left L5-S1 foraminal stenoses.  Statin intolerance    Right thyroid nodule     Followed by Dr. Alisha Pham (endo). -- 6/10/2014 [endo]: FNA Bx negative for malignancy      Vitamin D deficiency     - VitD 34.4 (7/8/2014)    *5/7/2014: VitD 14.6 (L)  - start 50k 2x/wk      Elevated TSH    Decreased libido     -- 3/4/2014: slightly low free testosterone, normal PSA, but slightly elevated TSH      TERRY (obstructive sleep apnea)     Followed by Dr. Dede MACE [facility])  -- 12/17/2013: overnight sleep study pending        Myalgia     *10/9/2014: improved off of januvia  *5/7/2014: sx unchanged off crestor    -- 3/4/2014: continue to hold crestor; advised massage & stretching  --  (2/21/2014); holding crestor until next appt  -- aldolase wnl (2/21/2014)      Routine health maintenance     -- c-scope: 4/6/2012 by Anna Rachel ([employer] [employer] [address]), hyperplastic polyps; next due in 10 yrs (Apr 2022)      Neural foraminal stenosis of cervical spine     C-spine MRI (Apr 2013):  - focal R paracentral disc protrusion @ C3-4 with mild cord impingement  - mild to mod foraminal stenoses, primarily @ C3-4 and C4-5 on right  - no C1-2 rheumatoid instability      Hemoglobin C trait ([address]     Seen on Hgb electrophoresis on 5/29/2013. Medications:     Current Outpatient Medications   Medication Sig    baclofen (LIORESAL) 10 mg tablet Take 1 Tab by mouth nightly as needed for Muscle Spasm(s).  metFORMIN (GLUCOPHAGE) 1,000 mg tablet TAKE 1 TABLET TWICE DAILY WITH MEALS    losartan-hydroCHLOROthiazide (HYZAAR) 100-12.5 mg per tablet TAKE 1 TABLET EVERY DAY    gabapentin (NEURONTIN) 100 mg capsule TAKE 2 CAPSULES EVERY NIGHT    ergocalciferol (ERGOCALCIFEROL) 50,000 unit capsule Take 1 Cap by mouth every seven (7) days.     Blood Glucose Control High&Low (ACCU-CHEK FREIDA CONTROL SOLN) soln Use to run controls    Blood-Glucose Meter (ACCU-CHEK FREIDA PLUS METER) misc Use to test blood sugar twice a day.  glucose blood VI test strips (ACCU-CHEK FREIDA PLUS TEST STRP) strip Use to test blood sugar twice a day.  lancets (ACCU-CHEK SOFTCLIX LANCETS) misc Test Blood sugar twice daily    alcohol swabs (BD SINGLE USE SWABS REGULAR) padm Use to test blood sugar twice a day.  loratadine 10 mg cap Take 10 mg by mouth daily as needed.  fluticasone (FLONASE) 50 mcg/actuation nasal spray 2 Sprays by Both Nostrils route daily. (Patient taking differently: 2 Sprays by Both Nostrils route daily as needed.)    traMADol (ULTRAM) 50 mg tablet TAKE 1 TABLET EVERY 8 HOURS AS NEEDED FOR PAIN  ( MAXIMUM DAILY AMOUNT:  150MG  )    leflunomide (ARAVA) 20 mg tablet Take 1 Tab by mouth daily.  acetaminophen (TYLENOL) 500 mg tablet 500 mg as needed.  red yeast rice extract 600 mg cap Take 1,800 mg by mouth two (2) times a day.  aspirin 81 mg tablet Take 1 Tab by mouth daily. No current facility-administered medications for this visit. Review of Systems:     ROS           Objective   Physical Assessment:   VS:    Vitals:    08/25/20 1052 08/25/20 1059   BP: 147/90 145/89   Pulse: 71    Resp: 16    Temp: 98.2 °F (36.8 °C)    TempSrc: Temporal    SpO2: 98%    Weight: 186 lb 9.6 oz (84.6 kg)    Height: 5' 9\" (1.753 m)    PainSc:   3    PainLoc: Back        Physical Exam  Nursing note reviewed. Constitutional:       Appearance: Normal appearance. He is well-developed. HENT:      Head: Normocephalic and atraumatic. Comments: Mask in place     Right Ear: Tympanic membrane, ear canal and external ear normal. There is impacted cerumen. Left Ear: Tympanic membrane, ear canal and external ear normal. There is impacted cerumen. Ears:      Comments: Cerumen unable to be disimpacted manually, successfully disimpacted using lavage bilaterally, normal TMs and EACs visualized afterwards. Neck:      Musculoskeletal: Neck supple.    Neurological:      Mental Status: He is alert and oriented to person, place, and time. Psychiatric:         Behavior: Behavior normal.         Thought Content:  Thought content normal.         Judgment: Judgment normal.

## 2020-09-16 DIAGNOSIS — M06.9 RHEUMATOID ARTHRITIS INVOLVING MULTIPLE SITES, UNSPECIFIED RHEUMATOID FACTOR PRESENCE: ICD-10-CM

## 2020-09-16 DIAGNOSIS — M51.37 DDD (DEGENERATIVE DISC DISEASE), LUMBOSACRAL: ICD-10-CM

## 2020-09-16 RX ORDER — GABAPENTIN 100 MG/1
CAPSULE ORAL
Qty: 180 CAP | Refills: 1 | Status: SHIPPED | OUTPATIENT
Start: 2020-09-16 | End: 2021-03-25 | Stop reason: SDUPTHER

## 2020-09-16 NOTE — TELEPHONE ENCOUNTER
Last seen 8/25/20 Last filled 12/23/19 qty 180 w/ 1 Future Appointments Date Time Provider Gela Mcdonald 11/20/2020  8:15 AM Lesley Jimenez  W. [facility]

## 2020-09-16 NOTE — TELEPHONE ENCOUNTER
Orders Placed This Encounter  gabapentin (NEURONTIN) 100 mg capsule Sig: TAKE 2 CAPSULES EVERY NIGHT Dispense:  180 Cap Refill:  1 Last PDMP Clotilde albert Reviewed: 
Review User Review Instant Review Result Shyanne Torres 9/16/2020 12:07 PM Reviewed PDMP [1]

## 2020-11-10 DIAGNOSIS — I10 ESSENTIAL HYPERTENSION: ICD-10-CM

## 2020-11-10 DIAGNOSIS — E11.9 DIABETES MELLITUS TYPE 2, NONINSULIN DEPENDENT (HCC): Chronic | ICD-10-CM

## 2020-11-10 RX ORDER — LOSARTAN POTASSIUM AND HYDROCHLOROTHIAZIDE 12.5; 1 MG/1; MG/1
1 TABLET ORAL DAILY
Qty: 90 TAB | Refills: 1 | Status: SHIPPED | OUTPATIENT
Start: 2020-11-10 | End: 2021-03-25 | Stop reason: SDUPTHER

## 2020-11-10 RX ORDER — METFORMIN HYDROCHLORIDE 1000 MG/1
1000 TABLET ORAL 2 TIMES DAILY
Qty: 180 TAB | Refills: 1 | Status: SHIPPED | OUTPATIENT
Start: 2020-11-10 | End: 2021-05-17 | Stop reason: SDUPTHER

## 2020-11-10 NOTE — TELEPHONE ENCOUNTER
Last seen 8/25/20    Last filled 4/1/20 qty 90 day w/ 1 refill    Future Appointments   Date Time Provider Gela Mcdonald   11/20/2020  8:15 AM Luis Miguel Jimenez MD [facility]

## 2020-11-11 LAB
25(OH)D3+25(OH)D2 SERPL-MCNC: 43.3 NG/ML (ref 30–100)
ALBUMIN SERPL-MCNC: 4.4 G/DL (ref 3.8–4.8)
ALBUMIN/GLOB SERPL: 1.5 {RATIO} (ref 1.2–2.2)
ALP SERPL-CCNC: 85 IU/L (ref 39–117)
ALT SERPL-CCNC: 21 IU/L (ref 0–44)
AST SERPL-CCNC: 17 IU/L (ref 0–40)
BASOPHILS # BLD AUTO: 0 X10E3/UL (ref 0–0.2)
BASOPHILS NFR BLD AUTO: 1 %
BILIRUB SERPL-MCNC: 0.5 MG/DL (ref 0–1.2)
BUN SERPL-MCNC: 6 MG/DL (ref 8–27)
BUN/CREAT SERPL: 8 (ref 10–24)
CALCIUM SERPL-MCNC: 9.5 MG/DL (ref 8.6–10.2)
CHLORIDE SERPL-SCNC: 99 MMOL/L (ref 96–106)
CHOLEST SERPL-MCNC: 235 MG/DL (ref 100–199)
CO2 SERPL-SCNC: 26 MMOL/L (ref 20–29)
CREAT SERPL-MCNC: 0.79 MG/DL (ref 0.76–1.27)
CRP SERPL-MCNC: 2 MG/L (ref 0–10)
EOSINOPHIL # BLD AUTO: 0.2 X10E3/UL (ref 0–0.4)
EOSINOPHIL NFR BLD AUTO: 6 %
ERYTHROCYTE [DISTWIDTH] IN BLOOD BY AUTOMATED COUNT: 14.5 % (ref 11.6–15.4)
ERYTHROCYTE [SEDIMENTATION RATE] IN BLOOD BY WESTERGREN METHOD: 72 MM/HR (ref 0–30)
EST. AVERAGE GLUCOSE BLD GHB EST-MCNC: 126 MG/DL
GLOBULIN SER CALC-MCNC: 3 G/DL (ref 1.5–4.5)
GLUCOSE SERPL-MCNC: 91 MG/DL (ref 65–99)
HBA1C MFR BLD: 6 % (ref 4.8–5.6)
HCT VFR BLD AUTO: 40.4 % (ref 37.5–51)
HDLC SERPL-MCNC: 40 MG/DL
HGB BLD-MCNC: 13.6 G/DL (ref 13–17.7)
IMM GRANULOCYTES # BLD AUTO: 0 X10E3/UL (ref 0–0.1)
IMM GRANULOCYTES NFR BLD AUTO: 0 %
INTERPRETATION, 910389: NORMAL
LDLC SERPL CALC-MCNC: 178 MG/DL (ref 0–99)
LYMPHOCYTES # BLD AUTO: 1.5 X10E3/UL (ref 0.7–3.1)
LYMPHOCYTES NFR BLD AUTO: 37 %
Lab: NORMAL
MCH RBC QN AUTO: 25.5 PG (ref 26.6–33)
MCHC RBC AUTO-ENTMCNC: 33.7 G/DL (ref 31.5–35.7)
MCV RBC AUTO: 76 FL (ref 79–97)
MONOCYTES # BLD AUTO: 0.6 X10E3/UL (ref 0.1–0.9)
MONOCYTES NFR BLD AUTO: 15 %
NEUTROPHILS # BLD AUTO: 1.7 X10E3/UL (ref 1.4–7)
NEUTROPHILS NFR BLD AUTO: 41 %
PLATELET # BLD AUTO: 260 X10E3/UL (ref 150–450)
POTASSIUM SERPL-SCNC: 4.3 MMOL/L (ref 3.5–5.2)
PROT SERPL-MCNC: 7.4 G/DL (ref 6–8.5)
PSA SERPL-MCNC: 0.8 NG/ML (ref 0–4)
RBC # BLD AUTO: 5.33 X10E6/UL (ref 4.14–5.8)
SODIUM SERPL-SCNC: 138 MMOL/L (ref 134–144)
T4 FREE SERPL-MCNC: 1.01 NG/DL (ref 0.82–1.77)
TRIGL SERPL-MCNC: 97 MG/DL (ref 0–149)
TSH SERPL DL<=0.005 MIU/L-ACNC: 2.89 UIU/ML (ref 0.45–4.5)
VLDLC SERPL CALC-MCNC: 17 MG/DL (ref 5–40)
WBC # BLD AUTO: 4.1 X10E3/UL (ref 3.4–10.8)

## 2020-12-01 DIAGNOSIS — R79.89 ELEVATED TSH: ICD-10-CM

## 2020-12-01 DIAGNOSIS — Z12.5 SPECIAL SCREENING FOR MALIGNANT NEOPLASM OF PROSTATE: ICD-10-CM

## 2020-12-01 DIAGNOSIS — E11.40 TYPE 2 DIABETES MELLITUS WITH DIABETIC NEUROPATHY, WITHOUT LONG-TERM CURRENT USE OF INSULIN (HCC): ICD-10-CM

## 2020-12-01 DIAGNOSIS — M06.9 RHEUMATOID ARTHRITIS INVOLVING MULTIPLE SITES (HCC): ICD-10-CM

## 2020-12-01 DIAGNOSIS — R73.03 PREDIABETES: ICD-10-CM

## 2020-12-01 DIAGNOSIS — Z78.9 STATIN INTOLERANCE: ICD-10-CM

## 2020-12-01 DIAGNOSIS — E78.5 HYPERLIPIDEMIA WITH TARGET LDL LESS THAN 100: ICD-10-CM

## 2020-12-01 DIAGNOSIS — E55.9 VITAMIN D DEFICIENCY: ICD-10-CM

## 2020-12-17 ENCOUNTER — VIRTUAL VISIT (OUTPATIENT)
Dept: FAMILY MEDICINE CLINIC | Age: 65
End: 2020-12-17
Payer: MEDICARE

## 2020-12-17 DIAGNOSIS — M06.9 RHEUMATOID ARTHRITIS INVOLVING MULTIPLE SITES, UNSPECIFIED WHETHER RHEUMATOID FACTOR PRESENT (HCC): Chronic | ICD-10-CM

## 2020-12-17 DIAGNOSIS — E11.40 TYPE 2 DIABETES MELLITUS WITH DIABETIC NEUROPATHY, WITHOUT LONG-TERM CURRENT USE OF INSULIN (HCC): Primary | ICD-10-CM

## 2020-12-17 DIAGNOSIS — E78.5 HYPERLIPIDEMIA WITH TARGET LDL LESS THAN 100: Chronic | ICD-10-CM

## 2020-12-17 DIAGNOSIS — E11.9 DIABETES MELLITUS TYPE 2, NONINSULIN DEPENDENT (HCC): Chronic | ICD-10-CM

## 2020-12-17 DIAGNOSIS — E55.9 VITAMIN D DEFICIENCY: Chronic | ICD-10-CM

## 2020-12-17 DIAGNOSIS — E53.8 B12 DEFICIENCY: ICD-10-CM

## 2020-12-17 DIAGNOSIS — I10 ESSENTIAL HYPERTENSION: Chronic | ICD-10-CM

## 2020-12-17 PROCEDURE — 99214 OFFICE O/P EST MOD 30 MIN: CPT | Performed by: FAMILY MEDICINE

## 2020-12-17 NOTE — LETTER
Mr. Lainey Escalera [address] [facility] [address] [facility]. Lab Results Component Value Date/Time WBC 4.1 11/10/2020 09:11 AM  
 HGB 13.6 11/10/2020 09:11 AM  
 HCT 40.4 11/10/2020 09:11 AM  
 PLATELET 668 89/03/7955 09:11 AM  
 MCV 76 (L) 11/10/2020 09:11 AM  
 
 
Lab Results Component Value Date/Time Sodium 138 11/10/2020 09:11 AM  
 Potassium 4.3 11/10/2020 09:11 AM  
 Chloride 99 11/10/2020 09:11 AM  
 CO2 26 11/10/2020 09:11 AM  
 Glucose 91 11/10/2020 09:11 AM  
 BUN 6 (L) 11/10/2020 09:11 AM  
 Creatinine 0.79 11/10/2020 09:11 AM  
 BUN/Creatinine ratio 8 (L) 11/10/2020 09:11 AM  
 GFR est  11/10/2020 09:11 AM  
 GFR est non-AA 93 11/10/2020 09:11 AM  
 Calcium 9.5 11/10/2020 09:11 AM  
 Bilirubin, total 0.5 11/10/2020 09:11 AM  
 Alk. phosphatase 85 11/10/2020 09:11 AM  
 Protein, total 7.4 11/10/2020 09:11 AM  
 Albumin 4.4 11/10/2020 09:11 AM  
 A-G Ratio 1.5 11/10/2020 09:11 AM  
 ALT (SGPT) 21 11/10/2020 09:11 AM  
 
 
Lab Results Component Value Date/Time Cholesterol, total 235 (H) 11/10/2020 09:11 AM  
 HDL Cholesterol 40 11/10/2020 09:11 AM  
 LDL Chol Calc (NIH) 178 (H) 11/10/2020 09:11 AM  
 VLDL Cholesterol Rufino 17 11/10/2020 09:11 AM  
 Triglyceride 97 11/10/2020 09:11 AM  
 
 
Lab Results Component Value Date/Time Hemoglobin A1c 6.0 (H) 11/10/2020 09:11 AM  
 Hemoglobin A1c (POC) 6.1 11/29/2018 10:17 AM  
 
 
Lab Results Component Value Date/Time TSH 2.890 11/10/2020 09:11 AM  
 T4, Free 1.01 11/10/2020 09:11 AM  
 
 
Lab Results Component Value Date/Time VITAMIN D, 25-HYDROXY 43.3 11/10/2020 09:11 AM  
   
 
Lab Results Component Value Date/Time C-Reactive Protein, Qt 2 11/10/2020 09:11 AM  
 
 
Lab Results Component Value Date/Time Sed rate (ESR) 72 (H) 11/10/2020 09:11 AM  
 
 
 
 
 
Please call if you have any questions [phone] . Sincerely, Brandan Hinson MD

## 2020-12-17 NOTE — PROGRESS NOTES
Note to patient:  The purpose of this note is to communicate optimally with the other physicians / APCs involved in your care. It is written using standard medical terminology. If you have questions regarding the details of the note, please contact my office to schedule an appointment to address your questions. [facility]  Primary Care Office Visit - Telemedicine Problem-Oriented Note    Consent: Sosa Ricketts., who was seen by synchronous (real-time) audio-video technology, and/or his healthcare decision maker, is aware that this patient-initiated, Telehealth encounter on 12/17/2020 is a billable service, with coverage as determined by his insurance carrier. He is aware that he may receive a bill and has provided verbal consent to proceed: Yes. This service was provided through telehealth via [employer], both the Patient Home and [facility]. Assessment & Plan:       ICD-10-CM ICD-9-CM   1. Type 2 diabetes mellitus with diabetic neuropathy, without long-term current use of insulin (HCC)  E11.40 250.60     357.2   2. Diabetes mellitus type 2, noninsulin dependent (HCC)  E11.9 250.00   3. B12 deficiency  E53.8 266.2   4. Rheumatoid arthritis involving multiple sites, unspecified whether rheumatoid factor present ([facility] [address]  M06.9 714.0   5. Vitamin D deficiency  E55.9 268.9   6. Essential hypertension  I10 401.9   7. Hyperlipidemia with target LDL less than 100  E78.5 272.4         # T2DM  Ongoing, well controlled. A1c 6.0  No change to Rx  > labs in 3mo  Key Antihyperglycemic Medications             metFORMIN (GLUCOPHAGE) 1,000 mg tablet (Taking) Take 1 Tab by mouth two (2) times a day.           # B12 def  Unclear control, on metformin  > check with next labs      # RA  Followed by rheum      # VitD  Stable  > cont ergo 50k weekly      # HTN  Unclear control  No change to current regimen  Key CAD CHF Meds             losartan-hydroCHLOROthiazide (HYZAAR) 100-12.5 mg per tablet (Taking) Take 1 Tab by mouth daily. aspirin 81 mg tablet (Taking) Take 1 Tab by mouth daily. # hyperlipidemia  Ongoing, unfortunately  # statin intolerant  > continue to work on diet        Orders Placed This Encounter    VITAMIN B12 & FOLATE     Standing Status:   Future     Standing Expiration Date:   12/17/2021    CBC WITH AUTOMATED DIFF     Standing Status:   Future     Standing Expiration Date:   12/17/2021    SED RATE (ESR)     Standing Status:   Future     Standing Expiration Date:   12/17/2021    C REACTIVE PROTEIN, QT     Standing Status:   Future     Standing Expiration Date:   07/99/3401    METABOLIC PANEL, COMPREHENSIVE     Standing Status:   Future     Standing Expiration Date:   12/17/2021    HEMOGLOBIN A1C WITH EAG     Standing Status:   Future     Standing Expiration Date:   12/17/2021     Future Appointments   Date Time Provider Gela Mcdonald   3/19/2021  8:10 AM LAB_[facility] BS AMB   4/2/2021 11:00 AM Aixa Jimenez MD [facility] AMB       Subjective:   Sosa Ricketts. is a 77 y.o. male who was seen for Joint Pain and Abnormal Lab Results    Last OV: Aug 2020    Next appt with Dr. Khalida Triana is 1/8/2021    Yesterday and today, particularly worse diffuse joint pain. \"but I always deal with it\"  Just celebrate 50th wedding anniversary    Compliant with Rx  Denies any AE      Prior to Admission medications    Medication Sig Start Date End Date Taking? Authorizing Provider   losartan-hydroCHLOROthiazide (HYZAAR) 100-12.5 mg per tablet Take 1 Tab by mouth daily. 11/10/20  Yes Julio Marcum MD   metFORMIN (GLUCOPHAGE) 1,000 mg tablet Take 1 Tab by mouth two (2) times a day. 11/10/20  Yes Julio Marcum MD   ergocalciferol (Vitamin D2) 1,250 mcg (50,000 unit) capsule Take 1 Cap by mouth every seven (7) days.  10/7/20  Yes Julio Marcum MD   gabapentin (NEURONTIN) 100 mg capsule TAKE 2 CAPSULES EVERY NIGHT 9/16/20  Yes Moriah Jimenez MD   loratadine 10 mg cap Take 10 mg by mouth daily as needed. Yes Provider, Historical   fluticasone (FLONASE) 50 mcg/actuation nasal spray 2 Sprays by Both Nostrils route daily. Patient taking differently: 2 Sprays by Both Nostrils route daily as needed. 6/14/17  Yes Jewels Griggs MD   leflunomide (ARAVA) 20 mg tablet Take 1 Tab by mouth daily. 9/8/16  Yes Jewels Griggs MD   red yeast rice extract 600 mg cap Take 1,800 mg by mouth two (2) times a day. Yes Provider, Historical   aspirin 81 mg tablet Take 1 Tab by mouth daily. 9/3/13  Yes Jewels Griggs MD   baclofen (LIORESAL) 10 mg tablet Take 1 Tab by mouth nightly as needed for Muscle Spasm(s). 7/17/20   Vesna Jimenez MD   Blood Glucose Control High&Low (ACCU-CHEK FREIDA CONTROL SOLN) soln Use to run controls 3/6/19   Jewels Griggs MD   Blood-Glucose Meter (ACCU-CHEK FREIDA PLUS METER) misc Use to test blood sugar twice a day. 3/6/19   Vesna Jimenez MD   glucose blood VI test strips (ACCU-CHEK FREIDA PLUS TEST STRP) strip Use to test blood sugar twice a day. 3/6/19   Jewels Griggs MD   lancets (ACCU-CHEK SOFTCLIX LANCETS) misc Test Blood sugar twice daily 3/6/19   Jewels Griggs MD   alcohol swabs (BD SINGLE USE SWABS REGULAR) padm Use to test blood sugar twice a day. 3/6/19   Vesna Jimenez MD   traMADol (ULTRAM) 50 mg tablet TAKE 1 TABLET EVERY 8 HOURS AS NEEDED FOR PAIN  ( MAXIMUM DAILY AMOUNT:  150MG  ) 11/15/16   Moriah Jimenez MD   acetaminophen (TYLENOL) 500 mg tablet 500 mg as needed.     Provider, Historical     Allergies   Allergen Reactions    Statins-Hmg-Coa Reductase Inhibitors Myalgia       Patient Active Problem List    Diagnosis Date Noted    Essential hypertension 04/20/2015     Priority: 1 - One    Diabetes mellitus type 2, noninsulin dependent ([facility] [address] 09/03/2013     Priority: 1 - One    [facility] 09/03/2013     Priority: 1 - One    Hyperlipidemia with target LDL less than 100 09/03/2013     Priority: 2 - Two    Chronic gastritis 03/01/2013     Priority: 2 - Two    Esophagitis 03/01/2013     Priority: 2 - Two    Type 2 diabetes mellitus with diabetic neuropathy ([facility] [address] 03/25/2018    DDD (degenerative disc disease), lumbosacral 12/11/2016    Statin intolerance 05/24/2016    Right thyroid nodule 05/10/2014    Vitamin D deficiency 05/07/2014    Elevated TSH 03/30/2014    Decreased libido 03/04/2014    TERRY (obstructive sleep apnea) 12/19/2013    Myalgia 12/04/2013    Routine health maintenance 12/03/2013    Neural foraminal stenosis of cervical spine 11/05/2013    Hemoglobin C trait ([facility] [address] 05/29/2013     Past Medical History:   Diagnosis Date    [facility]     H. pylori infection     s/p prevpack    H/O esophagogastroduodenoscopy 3/1/2013    irregular z-line, small 1cm submucosal nodule    Hypercholesterolemia     Hypertension     Rheumatoid arthritis(714.0)      Past Surgical History:   Procedure Laterality Date    ENDOSCOPY, COLON, DIAGNOSTIC      HX HERNIA REPAIR Right 1998    HX WISDOM TEETH EXTRACTION       Family History   Problem Relation Age of Onset    Heart Disease Mother     Heart Disease Father    Mary Ernandez Arthritis-rheumatoid Sister     Hypertension Sister     Heart Disease Sister     Heart Disease Brother      Social History     Tobacco Use    Smoking status: Former Smoker    Smokeless tobacco: Never Used   Substance Use Topics    Alcohol use: No       Review of Systems   Constitutional: Negative for chills and fever. HENT: Negative for ear pain and sore throat. Respiratory: Negative for cough and shortness of breath. Cardiovascular: Negative for chest pain and palpitations. Gastrointestinal: Negative for abdominal pain and heartburn. Genitourinary: Negative for dysuria. Musculoskeletal: Positive for joint pain (chronic, stable). Negative for myalgias. Skin: Negative for rash.    Neurological: Negative for speech change, focal weakness and headaches. Endo/Heme/Allergies: Does not bruise/bleed easily. Psychiatric/Behavioral: Negative for depression. The patient is not nervous/anxious and does not have insomnia. Objective:   Vital Signs: (As obtained by patient/caregiver at home)  There were no vitals taken for this visit. Physical Exam  Constitutional:       General: He is awake. He is not in acute distress. Appearance: He is not toxic-appearing or diaphoretic. HENT:      Head: Normocephalic and atraumatic. Nose: Nose normal.   Eyes:      General: No scleral icterus. Extraocular Movements: Extraocular movements intact. Conjunctiva/sclera: Conjunctivae normal.   Neck:      Musculoskeletal: Full passive range of motion without pain. Pulmonary:      Effort: Pulmonary effort is normal. No accessory muscle usage or respiratory distress. Skin:     General: Skin is dry. Neurological:      Mental Status: He is alert. Cranial Nerves: No cranial nerve deficit. Psychiatric:         Attention and Perception: Attention and perception normal.         Mood and Affect: Mood and affect normal.         Speech: Speech normal.         Behavior: Behavior normal. Behavior is cooperative.          Cognition and Memory: Cognition and memory normal.           Labs / Results:     Lab Results   Component Value Date/Time    WBC 4.1 11/10/2020 09:11 AM    HGB 13.6 11/10/2020 09:11 AM    HCT 40.4 11/10/2020 09:11 AM    PLATELET 569 16/20/9503 09:11 AM    MCV 76 (L) 11/10/2020 09:11 AM       Lab Results   Component Value Date/Time    Sodium 138 11/10/2020 09:11 AM    Potassium 4.3 11/10/2020 09:11 AM    Chloride 99 11/10/2020 09:11 AM    CO2 26 11/10/2020 09:11 AM    Glucose 91 11/10/2020 09:11 AM    BUN 6 (L) 11/10/2020 09:11 AM    Creatinine 0.79 11/10/2020 09:11 AM    BUN/Creatinine ratio 8 (L) 11/10/2020 09:11 AM    GFR est  11/10/2020 09:11 AM    GFR est non-AA 93 11/10/2020 09:11 AM    Calcium 9.5 11/10/2020 09:11 AM Bilirubin, total 0.5 11/10/2020 09:11 AM    Alk. phosphatase 85 11/10/2020 09:11 AM    Protein, total 7.4 11/10/2020 09:11 AM    Albumin 4.4 11/10/2020 09:11 AM    A-G Ratio 1.5 11/10/2020 09:11 AM    ALT (SGPT) 21 11/10/2020 09:11 AM       Lab Results   Component Value Date/Time    Cholesterol, total 235 (H) 11/10/2020 09:11 AM    HDL Cholesterol 40 11/10/2020 09:11 AM    LDL Chol Calc (NIH) 178 (H) 11/10/2020 09:11 AM    VLDL Cholesterol Rufino 17 11/10/2020 09:11 AM    Triglyceride 97 11/10/2020 09:11 AM       Lab Results   Component Value Date/Time    Hemoglobin A1c 6.0 (H) 11/10/2020 09:11 AM    Hemoglobin A1c (POC) 6.1 11/29/2018 10:17 AM       Lab Results   Component Value Date/Time    TSH 2.890 11/10/2020 09:11 AM    T4, Free 1.01 11/10/2020 09:11 AM       Lab Results   Component Value Date/Time    VITAMIN D, 25-HYDROXY 43.3 11/10/2020 09:11 AM         Lab Results   Component Value Date/Time    C-Reactive Protein, Qt 2 11/10/2020 09:11 AM       Lab Results   Component Value Date/Time    Sed rate (ESR) 72 (H) 11/10/2020 09:11 AM              We discussed the expected course, resolution and complications of the diagnosis(es) in detail. Medication risks, benefits, costs, interactions, and alternatives were discussed as indicated. I advised him to contact the office if his condition worsens, changes or fails to improve as anticipated. He expressed understanding with the diagnosis(es) and plan. Shaneka Baxter. is a 77 y.o. male who was evaluated by an audio-video encounter for concerns as above. Patient identification was verified prior to start of the visit. A caregiver was present when appropriate. Due to this being a TeleHealth encounter (During [place]AGTU-60 public health emergency), evaluation of the following organ systems was limited: Vitals/Constitutional/EENT/Resp/CV/GI//MS/Neuro/Skin/Heme-Lymph-Imm.   Pursuant to the emergency declaration under the [address] [facility], [facility] waiver authority and the [employer] Act, this Virtual Visit was conducted, with patient's (and/or legal guardian's) consent, to reduce the patient's risk of exposure to COVID-19 and provide necessary medical care. Services were provided through a synchronous discussion virtually to substitute for in-person clinic visit. I was in the office. The patient was at home.       Jesusita Qureshi MD  Internal Medicine, Family Medicine & Sports Medicine

## 2021-01-13 RX ORDER — BLOOD SUGAR DIAGNOSTIC
STRIP MISCELLANEOUS
Qty: 200 STRIP | Refills: 3 | Status: SHIPPED | OUTPATIENT
Start: 2021-01-13

## 2021-01-13 RX ORDER — ISOPROPYL ALCOHOL 70 ML/100ML
SWAB TOPICAL
Qty: 200 PAD | Refills: 3 | Status: SHIPPED | OUTPATIENT
Start: 2021-01-13

## 2021-01-13 NOTE — TELEPHONE ENCOUNTER
Last seen 12/17/20    Last filled 3/6/19 qty 200 w/ 3 refills    Future Appointments   Date Time Provider Gela Mcdonald   3/19/2021  8:10 AM LAB_[facility] BS AMB   4/2/2021 11:00 AM Yanira Jimenez MD [facility] BS AMB

## 2021-02-11 LAB
ALBUMIN SERPL-MCNC: 4.2 G/DL (ref 3.8–4.8)
ALBUMIN/GLOB SERPL: 1.3 {RATIO} (ref 1.2–2.2)
ALP SERPL-CCNC: 87 IU/L (ref 39–117)
ALT SERPL-CCNC: 20 IU/L (ref 0–44)
AST SERPL-CCNC: 18 IU/L (ref 0–40)
BASOPHILS # BLD AUTO: 0 X10E3/UL (ref 0–0.2)
BASOPHILS NFR BLD AUTO: 1 %
BILIRUB SERPL-MCNC: 0.4 MG/DL (ref 0–1.2)
BUN SERPL-MCNC: 9 MG/DL (ref 8–27)
BUN/CREAT SERPL: 11 (ref 10–24)
CALCIUM SERPL-MCNC: 9.8 MG/DL (ref 8.6–10.2)
CHLORIDE SERPL-SCNC: 101 MMOL/L (ref 96–106)
CO2 SERPL-SCNC: 24 MMOL/L (ref 20–29)
CREAT SERPL-MCNC: 0.82 MG/DL (ref 0.76–1.27)
CRP SERPL-MCNC: 2 MG/L (ref 0–10)
EOSINOPHIL # BLD AUTO: 0.2 X10E3/UL (ref 0–0.4)
EOSINOPHIL NFR BLD AUTO: 5 %
ERYTHROCYTE [DISTWIDTH] IN BLOOD BY AUTOMATED COUNT: 14.5 % (ref 11.6–15.4)
ERYTHROCYTE [SEDIMENTATION RATE] IN BLOOD BY WESTERGREN METHOD: 51 MM/HR (ref 0–30)
EST. AVERAGE GLUCOSE BLD GHB EST-MCNC: 131 MG/DL
FOLATE SERPL-MCNC: 8.1 NG/ML
GLOBULIN SER CALC-MCNC: 3.3 G/DL (ref 1.5–4.5)
GLUCOSE SERPL-MCNC: 88 MG/DL (ref 65–99)
HBA1C MFR BLD: 6.2 % (ref 4.8–5.6)
HCT VFR BLD AUTO: 40.6 % (ref 37.5–51)
HGB BLD-MCNC: 14.1 G/DL (ref 13–17.7)
IMM GRANULOCYTES # BLD AUTO: 0 X10E3/UL (ref 0–0.1)
IMM GRANULOCYTES NFR BLD AUTO: 0 %
LYMPHOCYTES # BLD AUTO: 1.6 X10E3/UL (ref 0.7–3.1)
LYMPHOCYTES NFR BLD AUTO: 37 %
MCH RBC QN AUTO: 25.9 PG (ref 26.6–33)
MCHC RBC AUTO-ENTMCNC: 34.7 G/DL (ref 31.5–35.7)
MCV RBC AUTO: 75 FL (ref 79–97)
MONOCYTES # BLD AUTO: 0.6 X10E3/UL (ref 0.1–0.9)
MONOCYTES NFR BLD AUTO: 14 %
NEUTROPHILS # BLD AUTO: 1.9 X10E3/UL (ref 1.4–7)
NEUTROPHILS NFR BLD AUTO: 43 %
PLATELET # BLD AUTO: 264 X10E3/UL (ref 150–450)
POTASSIUM SERPL-SCNC: 3.9 MMOL/L (ref 3.5–5.2)
PROT SERPL-MCNC: 7.5 G/DL (ref 6–8.5)
RBC # BLD AUTO: 5.45 X10E6/UL (ref 4.14–5.8)
SODIUM SERPL-SCNC: 140 MMOL/L (ref 134–144)
VIT B12 SERPL-MCNC: 306 PG/ML (ref 232–1245)
WBC # BLD AUTO: 4.3 X10E3/UL (ref 3.4–10.8)

## 2021-03-25 DIAGNOSIS — I10 ESSENTIAL HYPERTENSION: ICD-10-CM

## 2021-03-25 DIAGNOSIS — M06.9 RHEUMATOID ARTHRITIS INVOLVING MULTIPLE SITES (HCC): ICD-10-CM

## 2021-03-25 DIAGNOSIS — M51.37 DDD (DEGENERATIVE DISC DISEASE), LUMBOSACRAL: ICD-10-CM

## 2021-03-25 RX ORDER — GABAPENTIN 100 MG/1
CAPSULE ORAL
Qty: 180 CAP | Refills: 3 | Status: SHIPPED | OUTPATIENT
Start: 2021-03-25

## 2021-03-25 RX ORDER — LOSARTAN POTASSIUM AND HYDROCHLOROTHIAZIDE 12.5; 1 MG/1; MG/1
1 TABLET ORAL DAILY
Qty: 90 TAB | Refills: 3 | Status: SHIPPED | OUTPATIENT
Start: 2021-03-25

## 2021-03-25 NOTE — TELEPHONE ENCOUNTER
Last seen 12/17/20     Last filled  Gabapentin 9/16/20 qty 180 w/ 1 refill  Hyzaar 11/10/20 qty 90 w/ 1 refill.      Future Appointments   Date Time Provider Gela Mcdonald   4/16/2021 10:30 AM Lorenzo Jimenez MD [facility] BS AMB

## 2021-05-10 ENCOUNTER — TELEPHONE (OUTPATIENT)
Dept: FAMILY MEDICINE CLINIC | Age: 66
End: 2021-05-10

## 2021-05-10 DIAGNOSIS — E53.8 B12 DEFICIENCY: ICD-10-CM

## 2021-05-10 DIAGNOSIS — M06.9 RHEUMATOID ARTHRITIS INVOLVING MULTIPLE SITES, UNSPECIFIED WHETHER RHEUMATOID FACTOR PRESENT (HCC): Primary | ICD-10-CM

## 2021-05-10 DIAGNOSIS — E78.5 HYPERLIPIDEMIA WITH TARGET LDL LESS THAN 100: ICD-10-CM

## 2021-05-10 DIAGNOSIS — I10 ESSENTIAL HYPERTENSION: ICD-10-CM

## 2021-05-10 DIAGNOSIS — E55.9 VITAMIN D DEFICIENCY: ICD-10-CM

## 2021-05-10 DIAGNOSIS — E11.9 DIABETES MELLITUS TYPE 2, NONINSULIN DEPENDENT (HCC): ICD-10-CM

## 2021-05-10 DIAGNOSIS — E11.40 TYPE 2 DIABETES MELLITUS WITH DIABETIC NEUROPATHY, WITHOUT LONG-TERM CURRENT USE OF INSULIN (HCC): ICD-10-CM

## 2021-05-10 NOTE — TELEPHONE ENCOUNTER
Patient called and left message requesting lab order to be place for  so he can have labs done prior to his upcoming appointment    Future Appointments   Date Time Provider Gela Mcdonald   5/17/2021 10:30 AM Kesha Jimenez MD [facility] BS AMB

## 2021-05-11 NOTE — TELEPHONE ENCOUNTER
Lab orders printed for lab collect     Orders Placed This Encounter    LIPID PANEL    METABOLIC PANEL, COMPREHENSIVE    MICROALBUMIN, UR, RAND W/ MICROALB/CREAT RATIO    VITAMIN B12 & FOLATE    VITAMIN D, 25 HYDROXY    SED RATE (ESR)    C REACTIVE PROTEIN, QT    HEMOGLOBIN A1C WITH EAG

## 2021-05-13 LAB
25(OH)D3+25(OH)D2 SERPL-MCNC: 64.7 NG/ML (ref 30–100)
ALBUMIN SERPL-MCNC: 4.7 G/DL (ref 3.8–4.8)
ALBUMIN/CREAT UR: 9 MG/G CREAT (ref 0–29)
ALBUMIN/GLOB SERPL: 1.7 {RATIO} (ref 1.2–2.2)
ALP SERPL-CCNC: 81 IU/L (ref 39–117)
ALT SERPL-CCNC: 21 IU/L (ref 0–44)
AST SERPL-CCNC: 20 IU/L (ref 0–40)
BILIRUB SERPL-MCNC: 0.6 MG/DL (ref 0–1.2)
BUN SERPL-MCNC: 8 MG/DL (ref 8–27)
BUN/CREAT SERPL: 9 (ref 10–24)
CALCIUM SERPL-MCNC: 10 MG/DL (ref 8.6–10.2)
CHLORIDE SERPL-SCNC: 100 MMOL/L (ref 96–106)
CHOLEST SERPL-MCNC: 280 MG/DL (ref 100–199)
CO2 SERPL-SCNC: 28 MMOL/L (ref 20–29)
CREAT SERPL-MCNC: 0.92 MG/DL (ref 0.76–1.27)
CREAT UR-MCNC: 120.1 MG/DL
CRP SERPL-MCNC: 2 MG/L (ref 0–10)
ERYTHROCYTE [SEDIMENTATION RATE] IN BLOOD BY WESTERGREN METHOD: 44 MM/HR (ref 0–30)
EST. AVERAGE GLUCOSE BLD GHB EST-MCNC: 128 MG/DL
FOLATE SERPL-MCNC: 8 NG/ML
GLOBULIN SER CALC-MCNC: 2.8 G/DL (ref 1.5–4.5)
GLUCOSE SERPL-MCNC: 102 MG/DL (ref 65–99)
HBA1C MFR BLD: 6.1 % (ref 4.8–5.6)
HDLC SERPL-MCNC: 42 MG/DL
IMP & REVIEW OF LAB RESULTS: NORMAL
LDLC SERPL CALC-MCNC: 212 MG/DL (ref 0–99)
MICROALBUMIN UR-MCNC: 10.3 UG/ML
POTASSIUM SERPL-SCNC: 4.3 MMOL/L (ref 3.5–5.2)
PROT SERPL-MCNC: 7.5 G/DL (ref 6–8.5)
SODIUM SERPL-SCNC: 139 MMOL/L (ref 134–144)
TRIGL SERPL-MCNC: 141 MG/DL (ref 0–149)
VIT B12 SERPL-MCNC: 789 PG/ML (ref 232–1245)
VLDLC SERPL CALC-MCNC: 26 MG/DL (ref 5–40)

## 2021-05-17 ENCOUNTER — OFFICE VISIT (OUTPATIENT)
Dept: FAMILY MEDICINE CLINIC | Age: 66
End: 2021-05-17
Payer: MEDICARE

## 2021-05-17 VITALS
DIASTOLIC BLOOD PRESSURE: 92 MMHG | BODY MASS INDEX: 27.85 KG/M2 | TEMPERATURE: 97.5 F | OXYGEN SATURATION: 98 % | RESPIRATION RATE: 14 BRPM | WEIGHT: 188 LBS | HEIGHT: 69 IN | HEART RATE: 73 BPM | SYSTOLIC BLOOD PRESSURE: 140 MMHG

## 2021-05-17 DIAGNOSIS — M06.9 RHEUMATOID ARTHRITIS INVOLVING MULTIPLE SITES, UNSPECIFIED WHETHER RHEUMATOID FACTOR PRESENT (HCC): ICD-10-CM

## 2021-05-17 DIAGNOSIS — D58.2 HEMOGLOBIN C TRAIT (HCC): ICD-10-CM

## 2021-05-17 DIAGNOSIS — E11.40 TYPE 2 DIABETES MELLITUS WITH DIABETIC NEUROPATHY, WITHOUT LONG-TERM CURRENT USE OF INSULIN (HCC): ICD-10-CM

## 2021-05-17 DIAGNOSIS — E11.9 DIABETES MELLITUS TYPE 2, NONINSULIN DEPENDENT (HCC): Primary | Chronic | ICD-10-CM

## 2021-05-17 DIAGNOSIS — E55.9 VITAMIN D DEFICIENCY: ICD-10-CM

## 2021-05-17 PROCEDURE — 2022F DILAT RTA XM EVC RTNOPTHY: CPT | Performed by: FAMILY MEDICINE

## 2021-05-17 PROCEDURE — G8536 NO DOC ELDER MAL SCRN: HCPCS | Performed by: FAMILY MEDICINE

## 2021-05-17 PROCEDURE — 3044F HG A1C LEVEL LT 7.0%: CPT | Performed by: FAMILY MEDICINE

## 2021-05-17 PROCEDURE — G8419 CALC BMI OUT NRM PARAM NOF/U: HCPCS | Performed by: FAMILY MEDICINE

## 2021-05-17 PROCEDURE — G8755 DIAS BP > OR = 90: HCPCS | Performed by: FAMILY MEDICINE

## 2021-05-17 PROCEDURE — 3017F COLORECTAL CA SCREEN DOC REV: CPT | Performed by: FAMILY MEDICINE

## 2021-05-17 PROCEDURE — G8427 DOCREV CUR MEDS BY ELIG CLIN: HCPCS | Performed by: FAMILY MEDICINE

## 2021-05-17 PROCEDURE — 1101F PT FALLS ASSESS-DOCD LE1/YR: CPT | Performed by: FAMILY MEDICINE

## 2021-05-17 PROCEDURE — 99214 OFFICE O/P EST MOD 30 MIN: CPT | Performed by: FAMILY MEDICINE

## 2021-05-17 PROCEDURE — G8753 SYS BP > OR = 140: HCPCS | Performed by: FAMILY MEDICINE

## 2021-05-17 PROCEDURE — G8510 SCR DEP NEG, NO PLAN REQD: HCPCS | Performed by: FAMILY MEDICINE

## 2021-05-17 RX ORDER — METFORMIN HYDROCHLORIDE 1000 MG/1
1000 TABLET ORAL 2 TIMES DAILY
Qty: 180 TAB | Refills: 3 | Status: SHIPPED | OUTPATIENT
Start: 2021-05-17

## 2021-05-17 NOTE — PROGRESS NOTES
Note to patient:  The purpose of this note is to communicate optimally with the other physicians / APCs involved in your care. It is written using standard medical terminology. If you have questions regarding the details of the note, please contact my office to schedule an appointment to address your questions. Blayne Manuel  Primary Care Office Visit - Problem-Oriented    : 1955   Mary Morrow is a 74 y.o. male presenting for  Chief Complaint   Patient presents with    Follow-up            Assessment/Plan:           ICD-10-CM ICD-9-CM   1. Diabetes mellitus type 2, noninsulin dependent (HCC)  E11.9 250.00   2. Rheumatoid arthritis involving multiple sites, unspecified whether rheumatoid factor present ([facility]  M06.9 714.0   3. Type 2 diabetes mellitus with diabetic neuropathy, without long-term current use of insulin ([facility])  E11.40 250.60     357.2   4. Hemoglobin C trait ([facility])  D58.2 282.7   5. Vitamin D deficiency  E55.9 268.9       # T2DM - ongoing, stable  > no change to current regimen  > foot exam done today  Key Antihyperglycemic Medications             metFORMIN (GLUCOPHAGE) 1,000 mg tablet (Taking) Take 1 Tab by mouth two (2) times a day. # B12 def - much improved  > no change to current regimen    # RA   Followed by rheum    # VitD - stable  > cont ergo 50k weekly     # HTN - elevated today, however notes increased pain today  > encourage monitoring BP at home  > no change to current regimen  Key CAD CHF Meds             losartan-hydroCHLOROthiazide (HYZAAR) 100-12.5 mg per tablet (Taking) Take 1 Tab by mouth daily. aspirin 81 mg tablet (Taking) Take 1 Tab by mouth daily. # hyperlipidemia  Ongoing, unfortunately  # statin intolerant  > continue to work on diet       Orders Placed This Encounter    HM DIABETES FOOT EXAM    metFORMIN (GLUCOPHAGE) 1,000 mg tablet     Sig: Take 1 Tab by mouth two (2) times a day.      Dispense:  180 Tab     Refill: 3           This document may have been created with the aid of dictation software. Text may contain errors, particularly phonetic errors. Reviewed management plan & instructions with patient, who voiced understanding. Agustin Steve MD  Internal Medicine, Family Medicine & Sports Medicine  5/17/2021    Subjective   History:   Peter Cornejo is a 74 y.o. male presenting to address:  Chief Complaint   Patient presents with    Follow-up       last visit: virtual Dec 2020    Next appt with rheumatology next month. Still with ongoing diffuse joint pain, although at \"his\" baseline. Voices ongoing frustration with \"all those rheumatology medications being so expensive. \"    Tries to stay active, but isn't going to the gym 2/2 pandemic. Compliant with Rx. Denies any AE. \"when am I due for a colonoscopy? \"    Past Medical History:   Diagnosis Date    Diabetes ([facility]     H. pylori infection     s/p prevpack    H/O esophagogastroduodenoscopy 3/1/2013    irregular z-line, small 1cm submucosal nodule    Hypercholesterolemia     Hypertension     Rheumatoid arthritis(714.0)      Past Surgical History:   Procedure Laterality Date    ENDOSCOPY, COLON, DIAGNOSTIC      HX HERNIA REPAIR Right 1998    HX WISDOM TEETH EXTRACTION        reports that he has quit smoking. He has never used smokeless tobacco. He reports that he does not drink alcohol and does not use drugs.   Social History     Social History Narrative    Not on file     Social History     Tobacco Use   Smoking Status Former Smoker   Smokeless Tobacco Never Used     Family History   Problem Relation Age of Onset    Heart Disease Mother     Heart Disease Father     Heart Disease Brother     Arthritis-rheumatoid Sister     Arthritis-rheumatoid Sister     Arthritis-rheumatoid Sister     Arthritis-rheumatoid Daughter     Arthritis-rheumatoid Daughter     Arthritis-rheumatoid Paternal Aunt      Allergies   Allergen Reactions    Statins-Hmg-Coa Reductase Inhibitors Myalgia       Problem List:      Patient Active Problem List    Diagnosis    Essential hypertension     - meds: losartan/HCTZ 50/12.5        Diabetes mellitus type 2, noninsulin dependent ([facility]     - meds: metformin 1000mg BID    - A1c 7.5 (Nov 2015)  - A1c 6.5 (4/14/2015)    - foot exam: 7/8/2014    Intolerant of statins.  Rheumatoid arthritis ([facility]     -1/16/2017 [Dr. Manzo Said: seropositive long-standing disease on arava monotherapy, reporting RAPID3 16.3; sicca syndrome; r/o Sjogrens, labs, possibly US to eval disease activity    -4/26/2016: [Rheum] refilled leflunomide 20 mg daily, noted that he would be switching to a rheumatologist closer to home  -2/17/2016: continue on leflunomide, plaquenil, labs: cbc, ESR, CMP, CRP, B hand xrays;  follow up 2 mo    Previously followed by Dr. Josh Pena (rheum)    -- 9/29/2014 [rheum]: cont humira, plaquenil & tramadol; stop prednisone; flu shot given  -- 7/29/2014 [rheum]: cont humira & plaquenil; reduce prednisone to 2.5mg daily  -- 4/29/2014 [rheum]: combo of RA & DJD; continue humira, plaquenil & prednisone 5mg daily; added tramadol qhs prn; avoid NSAIDs; recs: check neck US to rule out lymph node swelling (R side, above clavicle)  -- Mar 2013: RF = 588      Hyperlipidemia with target LDL less than 100     - statin intolerant      Chronic gastritis    Esophagitis     Chronic esophgitis by bx with irregular z-line (Mar 2013) by Dr. Izabela Sprague (DLDS).  Type 2 diabetes mellitus with diabetic neuropathy ([facility]    DDD (degenerative disc disease), lumbosacral     L-spine XR (Dec 2016): Mild L4-S1 degenerative endplate reactive changes and disc space height loss. Moderate bilateral facet arthropathy L4-S1. Bilateral oblique views demonstrate moderate right and mild left L5-S1 foraminal stenoses.  Statin intolerance    Right thyroid nodule     Followed by Dr. Latia Elias (endo).     -- 6/10/2014 [endo]: FNA Bx negative for malignancy      Vitamin D deficiency     - VitD 34.4 (7/8/2014)    *5/7/2014: VitD 14.6 (L)  - start 50k 2x/wk      Elevated TSH    Decreased libido     -- 3/4/2014: slightly low free testosterone, normal PSA, but slightly elevated TSH      TERRY (obstructive sleep apnea)     Followed by Dr. Dama Rubinstein [facility])  -- 12/17/2013: overnight sleep study pending        Myalgia     *10/9/2014: improved off of januvia  *5/7/2014: sx unchanged off crestor    -- 3/4/2014: continue to hold crestor; advised massage & stretching  --  (2/21/2014); holding crestor until next appt  -- aldolase wnl (2/21/2014)      Routine health maintenance     -- c-scope: 4/6/2012 by [employer] ([employer]), hyperplastic polyps; next due in 10 yrs (Apr 2022)      Neural foraminal stenosis of cervical spine     C-spine MRI (Apr 2013):  - focal R paracentral disc protrusion @ C3-4 with mild cord impingement  - mild to mod foraminal stenoses, primarily @ C3-4 and C4-5 on right  - no C1-2 rheumatoid instability      Hemoglobin C trait ([address]     Seen on Hgb electrophoresis on 5/29/2013. Medications:     Current Outpatient Medications   Medication Sig    metFORMIN (GLUCOPHAGE) 1,000 mg tablet Take 1 Tab by mouth two (2) times a day.  gabapentin (NEURONTIN) 100 mg capsule TAKE 2 CAPSULES EVERY NIGHT    losartan-hydroCHLOROthiazide (HYZAAR) 100-12.5 mg per tablet Take 1 Tab by mouth daily.  alcohol swabs (BD Single Use Swabs Regular) padm Use to test blood sugar twice a day.  glucose blood VI test strips (Accu-Chek Shirley Plus test strp) strip Use to test blood sugar twice a day.  Blood-Glucose Meter (Accu-Chek Shirley Plus Meter) misc Use to test blood sugar twice a day.  ergocalciferol (Vitamin D2) 1,250 mcg (50,000 unit) capsule Take 1 Cap by mouth every seven (7) days.  baclofen (LIORESAL) 10 mg tablet Take 1 Tab by mouth nightly as needed for Muscle Spasm(s).     Blood Glucose Control High&Low (ACCU-CHEK FREIDA CONTROL SOLN) soln Use to run controls    lancets (ACCU-CHEK SOFTCLIX LANCETS) misc Test Blood sugar twice daily    loratadine 10 mg cap Take 10 mg by mouth daily as needed.  fluticasone (FLONASE) 50 mcg/actuation nasal spray 2 Sprays by Both Nostrils route daily. (Patient taking differently: 2 Sprays by Both Nostrils route daily as needed.)    traMADol (ULTRAM) 50 mg tablet TAKE 1 TABLET EVERY 8 HOURS AS NEEDED FOR PAIN  ( MAXIMUM DAILY AMOUNT:  150MG  )    leflunomide (ARAVA) 20 mg tablet Take 1 Tab by mouth daily.  acetaminophen (TYLENOL) 500 mg tablet 500 mg as needed.  red yeast rice extract 600 mg cap Take 1,800 mg by mouth two (2) times a day.  aspirin 81 mg tablet Take 1 Tab by mouth daily. No current facility-administered medications for this visit. Review of Systems:     Review of Systems           Objective   Physical Assessment:   VS:    Vitals:    05/17/21 1007 05/17/21 1016 05/17/21 1036   BP: (!) 151/103 (!) 142/94 (!) 140/92   Pulse: 73     Resp: 14     Temp: 97.5 °F (36.4 °C)     TempSrc: Temporal     SpO2: 98%     Weight: 188 lb (85.3 kg)     Height: 5' 9\" (1.753 m)     PainSc:   5     PainLoc: Back         Physical Exam  Nursing note reviewed. Constitutional:       General: He is not in acute distress. Appearance: He is well-developed. He is not diaphoretic. HENT:      Head: Normocephalic and atraumatic. Right Ear: External ear normal.      Left Ear: External ear normal.      Nose:      Comments: Mask in place  Eyes:      Pupils: Pupils are equal, round, and reactive to light. Neck:      Musculoskeletal: Normal range of motion and neck supple. Cardiovascular:      Rate and Rhythm: Normal rate and regular rhythm. Pulses:           Dorsalis pedis pulses are 1+ on the right side and 1+ on the left side. Posterior tibial pulses are 1+ on the right side and 1+ on the left side.       Heart sounds: Normal heart sounds. No murmur. Pulmonary:      Effort: Pulmonary effort is normal. No respiratory distress. Breath sounds: Normal breath sounds. No wheezing. Musculoskeletal: Normal range of motion. Right foot: Normal range of motion. No deformity or prominent metatarsal heads. Left foot: Normal range of motion. No deformity or prominent metatarsal heads. Feet:      Right foot:      Protective Sensation: 6 sites tested. 6 sites sensed. Skin integrity: Skin integrity normal.      Toenail Condition: Right toenails are normal.      Left foot:      Protective Sensation: 6 sites tested. 6 sites sensed. Skin integrity: Skin integrity normal.      Toenail Condition: Left toenails are normal.   Skin:     General: Skin is warm and dry. Neurological:      Mental Status: He is alert and oriented to person, place, and time. Cranial Nerves: No cranial nerve deficit. Psychiatric:         Behavior: Behavior normal.         Thought Content: Thought content normal.         Judgment: Judgment normal.         Recent Labs & Imaging:         Lab Results   Component Value Date/Time    Sodium 139 05/12/2021 08:49 AM    Potassium 4.3 05/12/2021 08:49 AM    Chloride 100 05/12/2021 08:49 AM    CO2 28 05/12/2021 08:49 AM    Glucose 102 (H) 05/12/2021 08:49 AM    BUN 8 05/12/2021 08:49 AM    Creatinine 0.92 05/12/2021 08:49 AM    BUN/Creatinine ratio 9 (L) 05/12/2021 08:49 AM    GFR est AA 98 05/12/2021 08:49 AM    GFR est non-AA 85 05/12/2021 08:49 AM    Calcium 10.0 05/12/2021 08:49 AM    Bilirubin, total 0.6 05/12/2021 08:49 AM    Alk.  phosphatase 81 05/12/2021 08:49 AM    Protein, total 7.5 05/12/2021 08:49 AM    Albumin 4.7 05/12/2021 08:49 AM    A-G Ratio 1.7 05/12/2021 08:49 AM    ALT (SGPT) 21 05/12/2021 08:49 AM       Lab Results   Component Value Date/Time    Cholesterol, total 280 (H) 05/12/2021 08:49 AM    HDL Cholesterol 42 05/12/2021 08:49 AM    LDL, calculated 212 (H) 05/12/2021 08:49 AM VLDL, calculated 26 05/12/2021 08:49 AM    Triglyceride 141 05/12/2021 08:49 AM       Lab Results   Component Value Date/Time    Hemoglobin A1c 6.1 (H) 05/12/2021 08:49 AM           Lab Results   Component Value Date/Time    VITAMIN D, 25-HYDROXY 64.7 05/12/2021 08:49 AM         Lab Results   Component Value Date/Time    Vitamin B12 789 05/12/2021 08:49 AM    Folate 8.0 05/12/2021 08:49 AM           Lab Results   Component Value Date/Time    C-Reactive Protein, Qt 2 05/12/2021 08:49 AM       Lab Results   Component Value Date/Time    Sed rate (ESR) 44 (H) 05/12/2021 08:49 AM

## 2021-05-17 NOTE — PATIENT INSTRUCTIONS
You are due for your 10yr colonoscopy in Apr 2022. I will put a note in the system for your new physician. Dr. Nancie Whitney no longer practicing Primary Care effective July 1st, 2021 Due to increasing family obligations, I will no longer be practicing as a Primary Care physician effective July 1, 2021. Leading up to July 1st, I will work collaboratively with my current primary care colleagues at [facility]) to facilitate this transition for my primary care patients. If you have not been contacted about any appointments scheduled for after July 1, 2021, please assist [facility] in optimizing the continuity of your care by contacting the practice for appropriate and timely rescheduling. Dr. Nancie Whitney will continue with [facility] as a Sports Medicine Specialist   
 
After July 1st, I will continue to serve the [facility] as a Sports Medicine Physician. My sports medicine practice will offer expertise in the non-operative treatment of acute and chronic musculoskeletal conditions, as well as non-musculoskeletal aspects of sports medicine for patients 12 years and older. Common examples of non-musculoskeletal sports medicine include: concussion (mild traumatic brain injury), exercise prescription, injury prevention, and return to [facility] decisions for the , the [employer] and the [employer]. I hope that you and your loved ones will keep me in mind for your sports medicine needs. Before July 1st:  After July 1st: [phone] TESTING RESULTS If you have MyChart access:  Per federal regulations as of October 20th, 2020, all of your results will be released to you and to your physician / provider simultaneously on [address]   
o This means that it is likely that you will have the opportunity to review your results before your physician / provider!  
 Since all \"critical\" abnormal results are immediately called to the office / on-call providers on nights, weekends and holidays - please refrain from calling after hours when you receive abnormal results through [address] Instead, allow time for your physician / provider to review your results and then provide interpretation and/or guidance.  If the results are significantly abnormal and require time-sensitive action - guidance will be provided both via [employer] and via telephone.  For all other results (interpreted as \"normal\", \"abnormal but expected\", \"reassuring / stable\", \"slightly abnormal\") - non-urgent guidance will be provided via [employer] communication only. Nilda  #[phone] If you do NOT have [employer] access:  If the results are significantly abnormal and require time-sensitive action - guidance will be relayed to you via telephone.  For all other results (interpreted as \"normal\", \"abnormal but expected\", \"reassuring / stable\", \"slightly abnormal\") - non-urgent guidance will be mailed to you via [email or web address] Results from Outside Facilities / Laboratories: 
Results of tests performed at outside facilities / laboratories likely will not appear in the [facility]. 
o They may appear in the patient portals of those outside facilities / laboratories. o Please keep in mind that with your access to your patient portal directly to an outside facility / laboratory, you are likely viewing results before your physician / provider! Please allow time for your physician / provider to review your results and then provide interpretation and/or guidance. If you have questions about your results beyond the guidance provided in [employer] or in your results letter, please schedule a follow up appointment to discuss with your physician / provider. MEDICATION REFILLS  Please request medication refills through your pharmacy, to ensure the correct pharmacy is used.  
 Please allow at least 2 business days for refill requests to be addressed.  Refills will not be provided by the after hours/on call provider. Lab Results Component Value Date/Time Sodium 139 05/12/2021 08:49 AM  
 Potassium 4.3 05/12/2021 08:49 AM  
 Chloride 100 05/12/2021 08:49 AM  
 CO2 28 05/12/2021 08:49 AM  
 Glucose 102 (H) 05/12/2021 08:49 AM  
 BUN 8 05/12/2021 08:49 AM  
 Creatinine 0.92 05/12/2021 08:49 AM  
 BUN/Creatinine ratio 9 (L) 05/12/2021 08:49 AM  
 GFR est AA 98 05/12/2021 08:49 AM  
 GFR est non-AA 85 05/12/2021 08:49 AM  
 Calcium 10.0 05/12/2021 08:49 AM  
 Bilirubin, total 0.6 05/12/2021 08:49 AM  
 Alk. phosphatase 81 05/12/2021 08:49 AM  
 Protein, total 7.5 05/12/2021 08:49 AM  
 Albumin 4.7 05/12/2021 08:49 AM  
 A-G Ratio 1.7 05/12/2021 08:49 AM  
 ALT (SGPT) 21 05/12/2021 08:49 AM  
 
 
Lab Results Component Value Date/Time Cholesterol, total 280 (H) 05/12/2021 08:49 AM  
 HDL Cholesterol 42 05/12/2021 08:49 AM  
 LDL, calculated 212 (H) 05/12/2021 08:49 AM  
 VLDL, calculated 26 05/12/2021 08:49 AM  
 Triglyceride 141 05/12/2021 08:49 AM  
 
 
Lab Results Component Value Date/Time Hemoglobin A1c 6.1 (H) 05/12/2021 08:49 AM  
 
 
 
 
Lab Results Component Value Date/Time VITAMIN D, 25-HYDROXY 64.7 05/12/2021 08:49 AM  
   
 
Lab Results Component Value Date/Time Vitamin B12 789 05/12/2021 08:49 AM  
 Folate 8.0 05/12/2021 08:49 AM  
 
 
 
 
Lab Results Component Value Date/Time C-Reactive Protein, Qt 2 05/12/2021 08:49 AM  
 
 
Lab Results Component Value Date/Time  Sed rate (ESR) 44 (H) 05/12/2021 08:49 AM

## 2021-05-17 NOTE — PROGRESS NOTES
Netta Wright presents today for   Chief Complaint   Patient presents with    Follow-up       Is someone accompanying this pt? Yes. Is the patient using any DME equipment during [address]? no    Depression Screening:  3 most recent PHQ Screens 5/17/2021   [facility] Not Done Patient Decline   Little interest or pleasure in doing things Not at all   Feeling down, depressed, irritable, or hopeless Not at all   Total Score PHQ 2 0   Trouble falling or staying asleep, or sleeping too much -   Feeling tired or having little energy -   Poor appetite, weight loss, or overeating -   Feeling bad about yourself - or that you are a failure or have let yourself or your family down -   Trouble concentrating on things such as school, work, reading, or watching TV -   Moving or speaking so slowly that other people could have noticed; or the opposite being so fidgety that others notice -   Thoughts of being better off dead, or hurting yourself in some way -   How difficult have these problems made it for you to do your work, take care of your home and get along with others -       Learning Assessment:  Learning Assessment 12/20/2019   PRIMARY LEARNER Patient   HIGHEST LEVEL OF EDUCATION - PRIMARY LEARNER  SOME COLLEGE   BARRIERS PRIMARY LEARNER NONE   CO-LEARNER CAREGIVER No   PRIMARY LANGUAGE ENGLISH   LEARNER PREFERENCE PRIMARY READING   ANSWERED BY patient   RELATIONSHIP SELF       Travel Screening:    Travel Screening     Question   Response    In the last month, have you been in contact with someone who was confirmed or suspected to have Coronavirus / COVID-19? No / Unsure    Have you had a COVID-19 viral test in the last 14 days? No    Do you have any of the following new or worsening symptoms? Joint pain (Not related to COVID)    Have you traveled internationally or domestically in the last month?   No      Travel History   Travel since 04/17/21     No documented travel since 04/17/21          Health Maintenance reviewed and discussed and ordered per Provider. Health Maintenance Due   Topic Date Due    COVID-19 Vaccine (1) Never done    Shingrix Vaccine Age 50> (1 of 2) Never done    Foot Exam Q1  12/20/2020   . Coordination of Care:  1. Have you been to the ER, urgent care clinic since your last visit? Hospitalized since your last visit? no    2. Have you seen or consulted any other health care providers outside of the [address] [place] [address] since your last visit? Include any pap smears or colon screening. Yes.

## 2021-08-18 ENCOUNTER — TELEPHONE (OUTPATIENT)
Dept: FAMILY MEDICINE CLINIC | Age: 66
End: 2021-08-18

## 2021-08-18 NOTE — TELEPHONE ENCOUNTER
----- Message from [facility] sent at 8/18/2021  8:56 AM EDT -----  Former pt of Dr. Marino Amaral, requesting appt for a Friday in September with NP Wilfredo. ............ Anmol Moreno

## 2022-11-10 NOTE — PROGRESS NOTES
Manuel Johnson is a 57 y.o. male  Chief Complaint   Patient presents with    Diabetes    Hypertension      is a 57 y.o. male (: 1955) presenting to address:        Vitals:    11/10/17 1010   Weight: 194 lb 6.4 oz (88.2 kg)   Height: 5' 9\" (1.753 m)   PainSc:   4   PainLoc: Face       Hearing/Vision:   No exam data present    Learning Assessment:     Learning Assessment 2014   PRIMARY LEARNER Patient   HIGHEST LEVEL OF EDUCATION - PRIMARY LEARNER  SOME COLLEGE   BARRIERS PRIMARY LEARNER NONE   CO-LEARNER CAREGIVER No   PRIMARY LANGUAGE ENGLISH   LEARNER PREFERENCE PRIMARY OTHER (COMMENT)   ANSWERED BY Patient   RELATIONSHIP SELF     Depression Screening:     PHQ over the last two weeks 11/10/2017   Little interest or pleasure in doing things Not at all   Feeling down, depressed or hopeless Not at all   Total Score PHQ 2 0   Trouble falling or staying asleep, or sleeping too much -   Feeling tired or having little energy -   Poor appetite or overeating -   Feeling bad about yourself - or that you are a failure or have let yourself or your family down -   Trouble concentrating on things such as school, work, reading or watching TV -   Moving or speaking so slowly that other people could have noticed; or the opposite being so fidgety that others notice -   Thoughts of being better off dead, or hurting yourself in some way -   How difficult have these problems made it for you to do your work, take care of your home and get along with others -     Fall Risk Assessment:     Fall Risk Assessment, last 12 mths 2016   Able to walk? Yes   Fall in past 12 months? No     Abuse Screening:     Abuse Screening Questionnaire 2016   Do you ever feel afraid of your partner? N   Are you in a relationship with someone who physically or mentally threatens you? N   Is it safe for you to go home? Y     Coordination of Care Questionaire:   1. Have you been to the ER, urgent care clinic since your last visit? You can take him to the lab to have blood drawn and drop off stool sample  I will contact you with results when they are back  Continue to encourage plenty of fluids, bland diet, good handwashing    If severe pain, blood in stool, signs of dehydration, other concerns, seek immediate medical care   Hospitalized since your last visit? NO    2. Have you seen or consulted any other health care providers outside of the [address] [place] [address] since your last visit? Include any pap smears or colon screening. NO    Advanced Directive:   1. Do you have an Advanced Directive? YES    2. Would you like information on Advanced Directives?  NO
